# Patient Record
Sex: FEMALE | Race: BLACK OR AFRICAN AMERICAN | Employment: UNEMPLOYED | URBAN - METROPOLITAN AREA
[De-identification: names, ages, dates, MRNs, and addresses within clinical notes are randomized per-mention and may not be internally consistent; named-entity substitution may affect disease eponyms.]

---

## 2017-01-29 ENCOUNTER — HOSPITAL ENCOUNTER (EMERGENCY)
Age: 57
Discharge: HOME | End: 2017-01-29
Attending: EMERGENCY MEDICINE
Payer: MEDICARE

## 2017-01-29 ENCOUNTER — APPOINTMENT (OUTPATIENT)
Dept: GENERAL RADIOLOGY | Age: 57
End: 2017-01-29
Attending: EMERGENCY MEDICINE
Payer: MEDICARE

## 2017-01-29 VITALS
SYSTOLIC BLOOD PRESSURE: 138 MMHG | RESPIRATION RATE: 16 BRPM | DIASTOLIC BLOOD PRESSURE: 66 MMHG | WEIGHT: 293 LBS | OXYGEN SATURATION: 98 % | HEIGHT: 69 IN | HEART RATE: 75 BPM | TEMPERATURE: 97.6 F | BODY MASS INDEX: 43.4 KG/M2

## 2017-01-29 DIAGNOSIS — M25.561 ACUTE PAIN OF RIGHT KNEE: ICD-10-CM

## 2017-01-29 DIAGNOSIS — M25.551 RIGHT HIP PAIN: Primary | ICD-10-CM

## 2017-01-29 PROCEDURE — 73502 X-RAY EXAM HIP UNI 2-3 VIEWS: CPT

## 2017-01-29 PROCEDURE — 99282 EMERGENCY DEPT VISIT SF MDM: CPT

## 2017-01-29 PROCEDURE — 73562 X-RAY EXAM OF KNEE 3: CPT

## 2017-01-29 RX ORDER — OXYCODONE AND ACETAMINOPHEN 5; 325 MG/1; MG/1
1 TABLET ORAL
Qty: 15 TAB | Refills: 0 | Status: SHIPPED | OUTPATIENT
Start: 2017-01-29 | End: 2017-12-15 | Stop reason: SDUPTHER

## 2017-01-29 NOTE — DISCHARGE INSTRUCTIONS
Hip Pain: Care Instructions  Your Care Instructions  Hip pain may be caused by many things, including overuse, a fall, or a twisting movement. Another cause of hip pain is arthritis. Your pain may increase when you stand up, walk, or squat. The pain may come and go or may be constant. Home treatment can help relieve hip pain, swelling, and stiffness. If your pain is ongoing, you may need more tests and treatment. Follow-up care is a key part of your treatment and safety. Be sure to make and go to all appointments, and call your doctor if you are having problems. Its also a good idea to know your test results and keep a list of the medicines you take. How can you care for yourself at home? · Take pain medicines exactly as directed. ¨ If the doctor gave you a prescription medicine for pain, take it as prescribed. ¨ If you are not taking a prescription pain medicine, ask your doctor if you can take an over-the-counter medicine. · Rest and protect your hip. Take a break from any activity, including standing or walking, that may cause pain. · Put ice or a cold pack against your hip for 10 to 20 minutes at a time. Try to do this every 1 to 2 hours for the next 3 days (when you are awake) or until the swelling goes down. Put a thin cloth between the ice and your skin. · Sleep on your healthy side with a pillow between your knees, or sleep on your back with pillows under your knees. · If there is no swelling, you can put moist heat, a heating pad, or a warm cloth on your hip. Do gentle stretching exercises to help keep your hip flexible. · Learn how to prevent falls. Have your vision and hearing checked regularly. Wear slippers or shoes with a nonskid sole. · Stay at a healthy weight. · Wear comfortable shoes. When should you call for help? Call 911 anytime you think you may need emergency care. For example, call if:  · You have sudden chest pain and shortness of breath, or you cough up blood.   · You are not able to stand or walk or bear weight. · Your buttocks, legs, or feet feel numb or tingly. · Your leg or foot is cool or pale or changes color. · You have severe pain. Call your doctor now or seek immediate medical care if:  · You have signs of infection, such as:  ¨ Increased pain, swelling, warmth, or redness in the hip area. ¨ Red streaks leading from the hip area. ¨ Pus draining from the hip area. ¨ A fever. · You have signs of a blood clot, such as:  ¨ Pain in your calf, back of the knee, thigh, or groin. ¨ Redness and swelling in your leg or groin. · You are not able to bend, straighten, or move your leg normally. · You have trouble urinating or having bowel movements. Watch closely for changes in your health, and be sure to contact your doctor if:  · You do not get better as expected. Where can you learn more? Go to http://marylou-abdirahman.info/. Enter G456 in the search box to learn more about \"Hip Pain: Care Instructions. \"  Current as of: May 27, 2016  Content Version: 11.1  © 5215-7136 [employer]. Care instructions adapted under license by [employer] (which disclaims liability or warranty for this information). If you have questions about a medical condition or this instruction, always ask your healthcare professional. [place] [address] any warranty or liability for your use of this information. Knee Pain or Injury: Care Instructions  Your Care Instructions    Injuries are a common cause of knee problems. Sudden (acute) injuries may be caused by a direct blow to the knee. They can also be caused by abnormal twisting, bending, or falling on the knee. Pain, bruising, or swelling may be severe, and may start within minutes of the injury. Overuse is another cause of knee pain. Other causes are climbing stairs, kneeling, and other activities that use the knee.  Everyday wear and tear, especially as you get older, also can cause knee pain. Rest, along with home treatment, often relieves pain and allows your knee to heal. If you have a serious knee injury, you may need tests and treatment. Follow-up care is a key part of your treatment and safety. Be sure to make and go to all appointments, and call your doctor if you are having problems. It's also a good idea to know your test results and keep a list of the medicines you take. How can you care for yourself at home? · Be safe with medicines. Read and follow all instructions on the label. ¨ If the doctor gave you a prescription medicine for pain, take it as prescribed. ¨ If you are not taking a prescription pain medicine, ask your doctor if you can take an over-the-counter medicine. · Rest and protect your knee. Take a break from any activity that may cause pain. · Put ice or a cold pack on your knee for 10 to 20 minutes at a time. Put a thin cloth between the ice and your skin. · Prop up a sore knee on a pillow when you ice it or anytime you sit or lie down for the next 3 days. Try to keep it above the level of your heart. This will help reduce swelling. · If your knee is not swollen, you can put moist heat, a heating pad, or a warm cloth on your knee. · If your doctor recommends an elastic bandage, sleeve, or other type of support for your knee, wear it as directed. · Follow your doctor's instructions about how much weight you can put on your leg. Use a cane, crutches, or a walker as instructed. · Follow your doctor's instructions about activity during your healing process. If you can do mild exercise, slowly increase your activity. · Reach and stay at a healthy weight. Extra weight can strain the joints, especially the knees and hips, and make the pain worse. Losing even a few pounds may help. When should you call for help? Call 911 anytime you think you may need emergency care.  For example, call if:  · You have symptoms of a blood clot in your lung (called a pulmonary embolism). These may include:  ¨ Sudden chest pain. ¨ Trouble breathing. ¨ Coughing up blood. Call your doctor now or seek immediate medical care if:  · You have severe or increasing pain. · Your leg or foot turns cold or changes color. · You cannot stand or put weight on your knee. · Your knee looks twisted or bent out of shape. · You cannot move your knee. · You have signs of infection, such as:  ¨ Increased pain, swelling, warmth, or redness. ¨ Red streaks leading from the knee. ¨ Pus draining from a place on your knee. ¨ A fever. · You have signs of a blood clot in your leg (called a deep vein thrombosis), such as:  ¨ Pain in your calf, back of the knee, thigh, or groin. ¨ Redness and swelling in your leg or groin. Watch closely for changes in your health, and be sure to contact your doctor if:  · You have tingling, weakness, or numbness in your knee. · You have any new symptoms, such as swelling. · You have bruises from a knee injury that last longer than 2 weeks. · You do not get better as expected. Where can you learn more? Go to http://marylou-abdirahman.info/. Enter K195 in the search box to learn more about \"Knee Pain or Injury: Care Instructions. \"  Current as of: May 27, 2016  Content Version: 11.1  © 7495-4803 [employer]. Care instructions adapted under license by [employer] (which disclaims liability or warranty for this information). If you have questions about a medical condition or this instruction, always ask your healthcare professional. [place] [address] any warranty or liability for your use of this information. We hope that we have addressed all of your medical concerns. The examination and treatment you received in the Emergency Department were for an emergent problem and were not intended as complete care. It is important that you follow up with your healthcare provider(s) for ongoing care.  If your symptoms worsen or do not improve as expected, and you are unable to reach your usual health care provider(s), you should return to the Emergency Department. Today's healthcare is undergoing tremendous change, and patient satisfaction surveys are one of the many tools to assess the quality of medical care. You may receive a survey from the [employer] regarding your experience in the Emergency Department. I hope that your experience has been completely positive, particularly the medical care that I provided. As such, please participate in the survey; anything less than excellent does not meet my expectations or intentions. [address] [place] [address] and [employer] participate in nationally recognized quality of care measures. If your blood pressure is greater than 120/80, as reported below, we urge that you seek medical care to address the potential of high blood pressure, commonly known as hypertension. Hypertension can be hereditary or can be caused by certain medical conditions, pain, stress, or \"white coat syndrome. \"       Please make an appointment with your health care provider(s) for follow up of your Emergency Department visit. VITALS:   Patient Vitals for the past 8 hrs:   Temp Pulse Resp BP SpO2   01/29/17 1704 98.4 °F (36.9 °C) 77 17 142/80 98 %          Thank you for allowing us to provide you with medical care today. We realize that you have many choices for your emergency care needs. Please choose us in the future for any continued health care needs. Mei Munoz, [facility]: [phone]            No results found for this or any previous visit (from the past 24 hour(s)). Xr Hip Rt W Or Wo Pelv 2-3 Vws    Result Date: 1/29/2017  EXAM:  XR HIP RT W OR WO PELV 2-3 VWS INDICATION:   fall, pain. COMPARISON: None.  FINDINGS: An AP view of the pelvis and a frogleg lateral view of the right hip demonstrate no fracture, dislocation or other acute abnormality. IMPRESSION:  No acute abnormality. Xr Knee Rt 3 V    Result Date: 1/29/2017  EXAM:  XR KNEE RT 3 V INDICATION:   fall, pain. COMPARISON: None. FINDINGS: Three views of the right knee demonstrate no fracture or other acute osseous or articular abnormality. There is no effusion. There is tricompartmental osteophyte formation. IMPRESSION:  Osteoarthritis. No acute findings. Anna Marie Denny

## 2017-01-29 NOTE — ED TRIAGE NOTES
TRIAGE NOTE: Pt arrives from home with complaint of continued RIGHT hip and knee since a fall on 1/20. Pt reports she was seen at [facility] and prescribed pain medication but she continues to have pain.

## 2017-01-29 NOTE — ED PROVIDER NOTES
HPI Comments: 60 y.o. female with past medical history significant for HTN, PSVT, and KEISHA presents with complaints of right hip and knee pain. The pt states that she fell on 17 and was seen at [facility] and had a normal workup then. She was rx pain medicines and d/c home. The pt states that she is continuing to have pain. The pt states that nothing makes the pain better or worse. The pt rates the pain as a 6/10 in severity. The pt describes the pain as sharp and intermittent. The pt denies taking anything at home for the pain. There are no other acute medical complaints at this time. Denies fever, chills, HA, dizziness, light headedness, dyspnea, chest pain, abdominal pain, N/V/D, melena, hematochezia, loss of bowel/bladder functioning, saddle anesthesia, urinary symptoms or any other acute medical conditions. PCP: MD Laisha Yeh PA-C      Patient is a 60 y.o. female presenting with hip pain. Hip Injury    Pertinent negatives include no numbness and no back pain.         Past Medical History:   Diagnosis Date    Cholelithiasis     Hypertension     KEISHA (obstructive sleep apnea)      Uses CPAP    PSVT (paroxysmal supraventricular tachycardia) (HCC)      Paroxysmal Supraventricular Tachycardia    Psychiatric disorder      Depression    Unspecified adverse effect of anesthesia May 2006     Postoperative hypoxemia, treated w/ O2 & IV Diuretics       Past Surgical History:   Procedure Laterality Date    Dilation and curettage      Hx tonsil and adenoidectomy      Hx dilation and curettage  May 2006    Hx gyn  2008     Uterine Ablation    Hx  section      Pr removal gallbladder      Hx cholecystectomy           Family History:   Problem Relation Age of Onset    Cancer Mother      Lung or breast, patient unsure    Heart Attack Father     Diabetes Sister        Social History     Social History    Marital status:      Spouse name: N/A    Number of children: N/A    Years of education: N/A     Occupational History    Not on file. Social History Main Topics    Smoking status: Never Smoker    Smokeless tobacco: Never Used    Alcohol use Yes    Drug use: No    Sexual activity: Yes     Partners: Male     Birth control/ protection: None     Other Topics Concern    Not on file     Social History Narrative         ALLERGIES: Codeine; Lipitor [atorvastatin]; and Pcn [penicillins]    Review of Systems   Constitutional: Negative for activity change, appetite change, diaphoresis and fever. HENT: Negative for ear discharge, ear pain, facial swelling, rhinorrhea, sore throat, tinnitus, trouble swallowing and voice change. Eyes: Negative for photophobia, pain, discharge, redness and visual disturbance. Respiratory: Negative for cough, chest tightness, shortness of breath, wheezing and stridor. Cardiovascular: Negative for chest pain and palpitations. Gastrointestinal: Negative for abdominal pain, constipation, diarrhea, nausea and vomiting. Endocrine: Negative for polydipsia and polyuria. Genitourinary: Negative for dysuria, flank pain and hematuria. Musculoskeletal: Positive for arthralgias and myalgias. Negative for back pain. Skin: Negative for color change and rash. Neurological: Negative for dizziness, syncope, speech difficulty, light-headedness and numbness. Psychiatric/Behavioral: Negative for behavioral problems. Vitals:    01/29/17 1704   BP: 142/80   Pulse: 77   Resp: 17   Temp: 98.4 °F (36.9 °C)   SpO2: 98%   Weight: (!) 193.2 kg (426 lb)   Height: 5' 9\" (1.753 m)            Physical Exam   Constitutional: She is oriented to person, place, and time. She appears well-developed and well-nourished. HENT:   Head: Normocephalic and atraumatic. Eyes: Conjunctivae are normal. Pupils are equal, round, and reactive to light. Right eye exhibits no discharge. Left eye exhibits no discharge. Neck: Normal range of motion.  Neck supple. No thyromegaly present. Cardiovascular: Normal rate, regular rhythm and normal heart sounds. Exam reveals no gallop and no friction rub. No murmur heard. Pulmonary/Chest: Effort normal and breath sounds normal. No respiratory distress. She has no wheezes. Musculoskeletal: Normal range of motion. No C, T, L, S spine tenderness. There is some mild tenderness over the right patella. Pt has full mobility of upper and lower extremities. Pt is able to ambulate with some difficulty using a cane. No perineal numbness. Pt is NVI. Neurological: She is alert and oriented to person, place, and time. Skin: Skin is warm. Psychiatric: She has a normal mood and affect. MDM  Number of Diagnoses or Management Options  Acute pain of right knee:   Right hip pain:   Diagnosis management comments: Pt presents with right hip and knee pain after GLF. All imaging studies were normal.  Pt able to ambulate using a cane. Will rx pain medicines and advise follow up with ortho for further evaluation of symptoms. Reviewed treatment plan with attending and they agree.   Adam Gonzalez PA-C     ED Course       Procedures

## 2017-01-30 NOTE — ED NOTES
Pt given discharge instructions, patient education, prescriptions, and follow up information by Provider. Pt states understanding. All questions answered. Pt discharged to home in private vehicle, wheeled out in wheelchair. Pt A/Ox4, RA, pain controlled.

## 2017-02-02 ENCOUNTER — HOSPITAL ENCOUNTER (OUTPATIENT)
Dept: CT IMAGING | Age: 57
Discharge: HOME | End: 2017-02-02
Attending: ORTHOPAEDIC SURGERY
Payer: MEDICARE

## 2017-02-02 DIAGNOSIS — S79.911A: ICD-10-CM

## 2017-02-02 PROCEDURE — 72192 CT PELVIS W/O DYE: CPT

## 2017-08-11 ENCOUNTER — TELEPHONE (OUTPATIENT)
Dept: INTERNAL MEDICINE CLINIC | Age: 57
End: 2017-08-11

## 2017-08-11 NOTE — TELEPHONE ENCOUNTER
Spoke with patient after 2 patient identifiers being note and advised that I could see her on Monday as a nurse visit triage her and then see if a doctor could see her, she declined stated she only wanted to see a doctor. I offered the next available appt onThursday, August 17, 2017 10:30 AM, pt accepted. Patient expressed understanding and has no further questions at this time.

## 2017-08-11 NOTE — TELEPHONE ENCOUNTER
#[phone] pt states she has foot swelling and pain. She would like an appt to see someone on Monday. As of right now I didn't have anything to offer. Please call pt.

## 2017-10-11 ENCOUNTER — TELEPHONE (OUTPATIENT)
Dept: INTERNAL MEDICINE CLINIC | Age: 57
End: 2017-10-11

## 2017-10-12 NOTE — TELEPHONE ENCOUNTER
Spoke with patient after 2 patient identifiers being note and advised that we have an appt on Monday, October 16, 2017 01:45 PM, patient accepted. Patient expressed understanding and has no further questions at this time.

## 2017-10-16 ENCOUNTER — OFFICE VISIT (OUTPATIENT)
Dept: INTERNAL MEDICINE CLINIC | Age: 57
End: 2017-10-16

## 2017-10-16 VITALS
SYSTOLIC BLOOD PRESSURE: 132 MMHG | TEMPERATURE: 98.1 F | DIASTOLIC BLOOD PRESSURE: 79 MMHG | HEIGHT: 69 IN | OXYGEN SATURATION: 96 % | HEART RATE: 86 BPM

## 2017-10-16 DIAGNOSIS — Z23 ENCOUNTER FOR IMMUNIZATION: ICD-10-CM

## 2017-10-16 DIAGNOSIS — M54.9 ACUTE BACK PAIN, UNSPECIFIED BACK LOCATION, UNSPECIFIED BACK PAIN LATERALITY: Primary | ICD-10-CM

## 2017-10-16 RX ORDER — HYDROCODONE BITARTRATE AND ACETAMINOPHEN 5; 325 MG/1; MG/1
1 TABLET ORAL
Qty: 21 TAB | Refills: 0 | Status: SHIPPED | OUTPATIENT
Start: 2017-10-16 | End: 2017-12-15 | Stop reason: SDUPTHER

## 2017-10-16 RX ORDER — CYCLOBENZAPRINE HCL 10 MG
10 TABLET ORAL
Qty: 30 TAB | Refills: 1 | Status: SHIPPED | OUTPATIENT
Start: 2017-10-16 | End: 2017-12-15 | Stop reason: SDUPTHER

## 2017-10-16 NOTE — MR AVS SNAPSHOT
Visit Information Date & Time Provider Department Dept. Phone Encounter #  
 10/16/2017  1:45 PM Silver Dahl, [address] [phone] [id number] Follow-up Instructions Return in about 1 month (around 11/16/2017), or if symptoms worsen or fail to improve, for cpe. Upcoming Health Maintenance Date Due Hepatitis C Screening 1960 DTaP/Tdap/Td series (1 - Tdap) 4/18/1979 BREAST CANCER SCRN MAMMOGRAM 6/16/2017 INFLUENZA AGE 9 TO ADULT 8/1/2017 PAP AKA CERVICAL CYTOLOGY 9/22/2017 FOBT Q 1 YEAR AGE 50-75 11/10/2017 Allergies as of 10/16/2017  Review Complete On: 1/29/2017 By: Handy Kovacs RN Severity Noted Reaction Type Reactions Codeine  08/27/2009    Other (comments)  
 hallucinates Lipitor [Atorvastatin]  08/27/2009    Myalgia Pcn [Penicillins]  08/27/2009    Hives Current Immunizations  Reviewed on 11/6/2012 Name Date Influenza Vaccine (Quad) PF 10/16/2017, 9/26/2016 Influenza Vaccine PF 9/9/2013 Influenza Vaccine Split 11/6/2012 11:53 AM  
  
 Not reviewed this visit You Were Diagnosed With   
  
 Codes Comments Acute back pain, unspecified back location, unspecified back pain laterality    -  Primary ICD-10-CM: M54.9 ICD-9-CM: 724.5 Encounter for immunization     ICD-10-CM: Y90 ICD-9-CM: V03.89 Vitals BP Pulse Temp Height(growth percentile) SpO2 OB Status 132/79 (BP 1 Location: Left arm, BP Patient Position: Sitting) 86 98.1 °F (36.7 °C) (Oral) 5' 9\" (1.753 m) 96% Ablation Smoking Status Never Smoker Preferred Pharmacy Pharmacy Name Phone [facility]  [place] [phone] Your Updated Medication List  
  
   
This list is accurate as of: 10/16/17  2:52 PM.  Always use your most recent med list.  
  
  
  
  
 BELSOMRA 20 mg tablet Generic drug:  suvorexant Take  by mouth nightly as needed for Insomnia. * cyclobenzaprine 10 mg tablet Commonly known as:  FLEXERIL Take 1 Tab by mouth two (2) times a day. * cyclobenzaprine 10 mg tablet Commonly known as:  FLEXERIL Take 1 Tab by mouth three (3) times daily as needed for Muscle Spasm(s). CYMBALTA 60 mg capsule Generic drug:  DULoxetine Take 120 mg by mouth daily. diazePAM 5 mg tablet Commonly known as:  VALIUM Take 1 Tab by mouth three (3) times daily as needed for Anxiety (spasm). Max Daily Amount: 15 mg.  
  
 * diclofenac EC 50 mg EC tablet Commonly known as:  VOLTAREN Take 1 Tab by mouth two (2) times a day. * VOLTAREN 1 % Gel Generic drug:  diclofenac DIOVAN 160 mg tablet Generic drug:  valsartan TAKE 1 TABLET BY MOUTH EVERY DAY  
  
 flecainide 100 mg tablet Commonly known as:  TAMBOCOR Take 100 mg by mouth daily. furosemide 20 mg tablet Commonly known as:  LASIX TAKE 1 TABLET BY MOUTH EVERY DAY  
  
 hydroCHLOROthiazide 25 mg tablet Commonly known as:  HYDRODIURIL Take 25 mg by mouth daily. * HYDROcodone-acetaminophen 7.5-325 mg per tablet Commonly known as:  Lynnetta Pat Take 1 Tab by mouth every six (6) hours as needed for Pain. Max Daily Amount: 4 Tabs. * HYDROcodone-acetaminophen 5-325 mg per tablet Commonly known as:  Lynnetta Pat Take 1 Tab by mouth every eight (8) hours as needed for Pain. Max Daily Amount: 3 Tabs. melatonin 3 mg tablet Take  by mouth. Taking 5 mg  
  
 nystatin powder Commonly known as:  MYCOSTATIN Apply  to affected area four (4) times daily. Omeprazole delayed release 20 mg tablet Commonly known as:  PRILOSEC D/R Take 1 Tab by mouth daily. oxybutynin chloride XL 10 mg CR tablet Commonly known as:  DITROPAN XL Take 10 mg by mouth daily. * oxyCODONE-acetaminophen 5-325 mg per tablet Commonly known as:  PERCOCET  
 Take 1 Tab by mouth every four (4) hours as needed for Pain. * oxyCODONE-acetaminophen 5-325 mg per tablet Commonly known as:  PERCOCET Take 1 Tab by mouth every four (4) hours as needed for Pain. Max Daily Amount: 6 Tabs. rosuvastatin 5 mg tablet Commonly known as:  CRESTOR  
TAKE 1 TABLET BY MOUTH DAILY  
  
 sulfacetamide 10 % ophthalmic solution Commonly known as:  BLEPH-10 Administer 1 Drop to both eyes four (4) times daily. verapamil  mg CR tablet Commonly known as:  CALAN-SR  
TAKE 1 TABLET BY MOUTH EVERY DAY WELLBUTRIN  mg XL tablet Generic drug:  buPROPion XL Take 300 mg by mouth every morning. * Notice: This list has 8 medication(s) that are the same as other medications prescribed for you. Read the directions carefully, and ask your doctor or other care provider to review them with you. Prescriptions Printed Refills HYDROcodone-acetaminophen (NORCO) 5-325 mg per tablet 0 Sig: Take 1 Tab by mouth every eight (8) hours as needed for Pain. Max Daily Amount: 3 Tabs. Class: Print Route: Oral  
  
Prescriptions Sent to Pharmacy Refills  
 cyclobenzaprine (FLEXERIL) 10 mg tablet 1 Sig: Take 1 Tab by mouth three (3) times daily as needed for Muscle Spasm(s). Class: Normal  
 Pharmacy: [facility] [address] #: [phone] Route: Oral  
  
We Performed the Following INFLUENZA VIRUS VAC QUAD,SPLIT,PRESV FREE SYRINGE IM [id number] CPT(R)] VA IMMUNIZ ADMIN,1 SINGLE/COMB VAC/TOXOID [id number] CPT(R)] Follow-up Instructions Return in about 1 month (around 11/16/2017), or if symptoms worsen or fail to improve, for cpe. Introducing [facility]! Dear Denis Query: Thank you for requesting a [employer] account. Our records indicate that you already have an active [employer] account.   You can access your account anytime at [email or web address] Did you know that you can access your hospital and ER discharge instructions at any time in [employer]? You can also review all of your test results from your hospital stay or ER visit. Additional Information If you have questions, please visit the Frequently Asked Questions section of the [employer] website at [email or web address] Remember, [employer] is NOT to be used for urgent needs. For medical emergencies, dial 911. Now available from your iPhone and Android! Please provide this summary of care documentation to your next provider. Your primary care clinician is listed as Siria PERALTA. If you have any questions after today's visit, please call [phone].

## 2017-10-16 NOTE — PROGRESS NOTES
HISTORY OF PRESENT ILLNESS  Rosibel Roberson is a 59 y.o. female. HPI     C/o pain in upper to lower back x 1 week  Pain in back muscles worse with activity, taking deep breath and any movements  Has taken motrin otc 2 q6 hrs this past week--not helping the pain  Pain isdull but stabbing when stands up. No radiation to legs  Pain is 8/10 sitting      Patient Active Problem List    Diagnosis Date Noted    Morbid obesity with body mass index of 60.0-69.9 in adult [facility]) 03/01/2016    KEISHA (obstructive sleep apnea) 10/11/2010    Essential hypertension, benign 02/20/2010    [facility] 02/20/2010    Depression 02/20/2010    Carpal tunnel syndrome 02/20/2010    Pure hypercholesterolemia 02/20/2010     Current Outpatient Prescriptions   Medication Sig Dispense Refill    suvorexant (BELSOMRA) 20 mg tablet Take  by mouth nightly as needed for Insomnia.  cyclobenzaprine (FLEXERIL) 10 mg tablet Take 1 Tab by mouth three (3) times daily as needed for Muscle Spasm(s). 30 Tab 1    HYDROcodone-acetaminophen (NORCO) 5-325 mg per tablet Take 1 Tab by mouth every eight (8) hours as needed for Pain. Max Daily Amount: 3 Tabs. 21 Tab 0    rosuvastatin (CRESTOR) 5 mg tablet TAKE 1 TABLET BY MOUTH DAILY 90 Tab 6    furosemide (LASIX) 20 mg tablet TAKE 1 TABLET BY MOUTH EVERY DAY 90 Tab 3    verapamil ER (CALAN-SR) 240 mg CR tablet TAKE 1 TABLET BY MOUTH EVERY DAY 90 Tab 3    nystatin (MYCOSTATIN) powder Apply  to affected area four (4) times daily. 30 g 1    oxybutynin chloride XL (DITROPAN XL) 10 mg CR tablet Take 10 mg by mouth daily.  DIOVAN 160 mg tablet TAKE 1 TABLET BY MOUTH EVERY DAY 30 Tab 0    buPROPion XL (WELLBUTRIN XL) 300 mg XL tablet Take 300 mg by mouth every morning.  flecainide (TAMBOCOR) 100 mg tablet Take 100 mg by mouth daily.  duloxetine (CYMBALTA) 60 mg capsule Take 120 mg by mouth daily.       oxyCODONE-acetaminophen (PERCOCET) 5-325 mg per tablet Take 1 Tab by mouth every four (4) hours as needed for Pain. Max Daily Amount: 6 Tabs. 15 Tab 0    diazepam (VALIUM) 5 mg tablet Take 1 Tab by mouth three (3) times daily as needed for Anxiety (spasm). Max Daily Amount: 15 mg. 20 Tab 0    VOLTAREN 1 % gel   1    HYDROcodone-acetaminophen (NORCO) 7.5-325 mg per tablet Take 1 Tab by mouth every six (6) hours as needed for Pain. Max Daily Amount: 4 Tabs. 20 Tab 0    sulfacetamide (BLEPH-10) 10 % ophthalmic solution Administer 1 Drop to both eyes four (4) times daily. 1 Bottle 0    oxyCODONE-acetaminophen (PERCOCET) 5-325 mg per tablet Take 1 Tab by mouth every four (4) hours as needed for Pain. 20 Tab 0    melatonin 3 mg tablet Take  by mouth. Taking 5 mg         diclofenac EC (VOLTAREN) 50 mg EC tablet Take 1 Tab by mouth two (2) times a day. 30 Tab 1    cyclobenzaprine (FLEXERIL) 10 mg tablet Take 1 Tab by mouth two (2) times a day. 30 Tab 0    Omeprazole delayed release (PRILOSEC D/R) 20 mg tablet Take 1 Tab by mouth daily. 30 Tab 2    hydrochlorothiazide (HYDRODIURIL) 25 mg tablet Take 25 mg by mouth daily.        Allergies   Allergen Reactions    Codeine Other (comments)     hallucinates    Lipitor [Atorvastatin] Myalgia    Pcn [Penicillins] Hives      Lab Results  Component Value Date/Time   Hemoglobin A1c 5.6 09/26/2016 10:46 AM   Hemoglobin A1c 5.6 04/29/2015 10:46 AM   Hemoglobin A1c 5.1 12/24/2012 09:51 AM   Glucose 113 09/26/2016 10:46 AM   Glucose (POC) 110 08/04/2010 09:57 AM   LDL, calculated 79 09/26/2016 10:46 AM   Creatinine (POC) 1.1 08/04/2010 09:57 AM   Creatinine 1.08 09/26/2016 10:46 AM      Lab Results  Component Value Date/Time   Cholesterol, total 145 09/26/2016 10:46 AM   Cholesterol (POC) 158 05/19/2014 11:54 AM   HDL Cholesterol 53 09/26/2016 10:46 AM   LDL, calculated 79 09/26/2016 10:46 AM   LDL Cholesterol (POC) 59 05/19/2014 11:54 AM   Triglyceride 65 09/26/2016 10:46 AM   Triglycerides (POC) 209 05/19/2014 11:54 AM     Lab Results  Component Value Date/Time   GFR est non-AA 57 09/26/2016 10:46 AM   GFRNA, POC 55 08/04/2010 09:57 AM   GFR est AA 65 09/26/2016 10:46 AM   GFRAA, POC >60 08/04/2010 09:57 AM   Creatinine 1.08 09/26/2016 10:46 AM   Creatinine (POC) 1.1 08/04/2010 09:57 AM   BUN 19 09/26/2016 10:46 AM   BUN (POC) 17 08/04/2010 09:57 AM   Sodium 142 09/26/2016 10:46 AM   Sodium (POC) 141 08/04/2010 09:57 AM   Potassium 4.7 09/26/2016 10:46 AM   Potassium (POC) 4.1 08/04/2010 09:57 AM   Chloride 100 09/26/2016 10:46 AM   Chloride (POC) 106 08/04/2010 09:57 AM   CO2 23 09/26/2016 10:46 AM          ROS    Physical Exam   Constitutional: She appears well-developed and well-nourished. Appears stated age, severe obesity, nad sitting in [facility]   Cardiovascular: Normal rate, regular rhythm and normal heart sounds. Exam reveals no gallop and no friction rub. No murmur heard. Pulmonary/Chest: Effort normal and breath sounds normal. No respiratory distress. She has no wheezes. Abdominal: Soft. Bowel sounds are normal.   Musculoskeletal: She exhibits no edema. No TTP of the spine  Mild TTP of paraspinous muscles  Decreased flexion and extension of back    Neurological: She is alert. Psychiatric: She has a normal mood and affect. Nursing note and vitals reviewed. ASSESSMENT and PLAN  Diagnoses and all orders for this visit:    1. Acute back pain, unspecified back location, unspecified back pain laterality   C/w strain   Heat and ice renée   flexril tid prn   lortab 5/325 mg tid prn--discussed use of otc Lax, cautioned of sedation with above medicines  2. Encounter for immunization  -     Influenza virus vaccine (QUADRIVALENT PRES FREE SYRINGE) IM (73070)  -     IN IMMUNIZ ADMIN,1 SINGLE/COMB VAC/TOXOID    Other orders  -     cyclobenzaprine (FLEXERIL) 10 mg tablet; Take 1 Tab by mouth three (3) times daily as needed for Muscle Spasm(s). -     HYDROcodone-acetaminophen (NORCO) 5-325 mg per tablet;  Take 1 Tab by mouth every eight (8) hours as needed for Pain. Max Daily Amount: 3 Tabs. Follow-up Disposition:  Return in about 1 month (around 11/16/2017), or if symptoms worsen or fail to improve, for cpe.

## 2017-12-15 ENCOUNTER — OFFICE VISIT (OUTPATIENT)
Dept: INTERNAL MEDICINE CLINIC | Age: 57
End: 2017-12-15

## 2017-12-15 VITALS
HEART RATE: 85 BPM | DIASTOLIC BLOOD PRESSURE: 70 MMHG | OXYGEN SATURATION: 92 % | SYSTOLIC BLOOD PRESSURE: 124 MMHG | TEMPERATURE: 98 F

## 2017-12-15 DIAGNOSIS — M54.50 BILATERAL LOW BACK PAIN WITHOUT SCIATICA, UNSPECIFIED CHRONICITY: Primary | ICD-10-CM

## 2017-12-15 DIAGNOSIS — Z12.11 COLON CANCER SCREENING: ICD-10-CM

## 2017-12-15 DIAGNOSIS — I10 ESSENTIAL HYPERTENSION: ICD-10-CM

## 2017-12-15 DIAGNOSIS — Z11.59 NEED FOR HEPATITIS C SCREENING TEST: ICD-10-CM

## 2017-12-15 DIAGNOSIS — R79.89 ELEVATED TSH: ICD-10-CM

## 2017-12-15 DIAGNOSIS — E78.00 PURE HYPERCHOLESTEROLEMIA: ICD-10-CM

## 2017-12-15 RX ORDER — HYDROCODONE BITARTRATE AND ACETAMINOPHEN 5; 325 MG/1; MG/1
1 TABLET ORAL
Qty: 21 TAB | Refills: 0 | Status: SHIPPED | OUTPATIENT
Start: 2017-12-15 | End: 2018-06-28

## 2017-12-15 NOTE — MR AVS SNAPSHOT
Visit Information Date & Time Provider Department Dept. Phone Encounter #  
 12/15/2017  9:45 AM Maciej Walter, [address] [phone] [id number] Follow-up Instructions Return in about 6 months (around 6/15/2018) for htn hld obesity . Upcoming Health Maintenance Date Due Hepatitis C Screening 1960 DTaP/Tdap/Td series (1 - Tdap) 4/18/1979 PAP AKA CERVICAL CYTOLOGY 9/22/2017 FOBT Q 1 YEAR AGE 50-75 11/10/2017 Allergies as of 12/15/2017  Review Complete On: 12/15/2017 By: Olinda Blocker, LPN Severity Noted Reaction Type Reactions Codeine  08/27/2009    Other (comments)  
 hallucinates Lipitor [Atorvastatin]  08/27/2009    Myalgia Pcn [Penicillins]  08/27/2009    Hives Current Immunizations  Reviewed on 11/6/2012 Name Date Influenza Vaccine (Quad) PF 10/16/2017, 9/26/2016 Influenza Vaccine PF 9/9/2013 Influenza Vaccine Split 11/6/2012 11:53 AM  
  
 Not reviewed this visit You Were Diagnosed With   
  
 Codes Comments Bilateral low back pain without sciatica, unspecified chronicity    -  Primary ICD-10-CM: M54.5 ICD-9-CM: 724.2 Essential hypertension     ICD-10-CM: I10 
ICD-9-CM: 401.9 Pure hypercholesterolemia     ICD-10-CM: E78.00 ICD-9-CM: 272.0 Elevated TSH     ICD-10-CM: R94.6 ICD-9-CM: 794.5 Vitals BP Pulse Temp SpO2 OB Status Smoking Status 124/70 85 98 °F (36.7 °C) (Oral) 92% Ablation Never Smoker Vitals History Preferred Pharmacy Pharmacy Name Phone [facility] [address]  [place] [phone] Your Updated Medication List  
  
   
This list is accurate as of: 12/15/17 10:54 AM.  Always use your most recent med list.  
  
  
  
  
 BELSOMRA 20 mg tablet Generic drug:  suvorexant Take  by mouth nightly as needed for Insomnia. cyclobenzaprine 10 mg tablet Commonly known as:  FLEXERIL Take 1 Tab by mouth two (2) times a day. CYMBALTA 60 mg capsule Generic drug:  DULoxetine Take 120 mg by mouth daily. diazePAM 5 mg tablet Commonly known as:  VALIUM Take 1 Tab by mouth three (3) times daily as needed for Anxiety (spasm). Max Daily Amount: 15 mg.  
  
 * diclofenac EC 50 mg EC tablet Commonly known as:  VOLTAREN Take 1 Tab by mouth two (2) times a day. * VOLTAREN 1 % Gel Generic drug:  diclofenac DIOVAN 160 mg tablet Generic drug:  valsartan TAKE 1 TABLET BY MOUTH EVERY DAY  
  
 flecainide 100 mg tablet Commonly known as:  TAMBOCOR Take 100 mg by mouth daily. furosemide 20 mg tablet Commonly known as:  LASIX TAKE 1 TABLET BY MOUTH EVERY DAY  
  
 hydroCHLOROthiazide 25 mg tablet Commonly known as:  HYDRODIURIL Take 25 mg by mouth daily. * HYDROcodone-acetaminophen 7.5-325 mg per tablet Commonly known as:  Rosie Chong Take 1 Tab by mouth every six (6) hours as needed for Pain. Max Daily Amount: 4 Tabs. * HYDROcodone-acetaminophen 5-325 mg per tablet Commonly known as:  Rosie Chong Take 1 Tab by mouth every eight (8) hours as needed for Pain. Max Daily Amount: 3 Tabs. melatonin 3 mg tablet Take  by mouth. Taking 5 mg  
  
 nystatin powder Commonly known as:  MYCOSTATIN Apply  to affected area four (4) times daily. Omeprazole delayed release 20 mg tablet Commonly known as:  PRILOSEC D/R Take 1 Tab by mouth daily. oxybutynin chloride XL 10 mg CR tablet Commonly known as:  DITROPAN XL Take 10 mg by mouth daily. rosuvastatin 5 mg tablet Commonly known as:  CRESTOR  
TAKE 1 TABLET BY MOUTH DAILY  
  
 verapamil  mg CR tablet Commonly known as:  CALAN-SR  
TAKE 1 TABLET BY MOUTH EVERY DAY WELLBUTRIN  mg XL tablet Generic drug:  buPROPion XL Take 300 mg by mouth every morning. * Notice: This list has 4 medication(s) that are the same as other medications prescribed for you. Read the directions carefully, and ask your doctor or other care provider to review them with you. Prescriptions Printed Refills HYDROcodone-acetaminophen (NORCO) 5-325 mg per tablet 0 Sig: Take 1 Tab by mouth every eight (8) hours as needed for Pain. Max Daily Amount: 3 Tabs. Class: Print Route: Oral  
  
We Performed the Following CBC W/O DIFF [21797 CPT(R)] LIPID PANEL [04003 CPT(R)] METABOLIC PANEL, COMPREHENSIVE [43613 CPT(R)] T4, FREE [id number] CPT(R)] TSH 3RD GENERATION [08556 CPT(R)] Follow-up Instructions Return in about 6 months (around 6/15/2018) for htn hld obesity . Introducing [facility]! Dear Kemal Molina: Thank you for requesting a [facility] account. Our records indicate that you already have an active [facility] account. You can access your account anytime at [email or web address] Did you know that you can access your hospital and ER discharge instructions at any time in [facility]? You can also review all of your test results from your hospital stay or ER visit. Additional Information If you have questions, please visit the Frequently Asked Questions section of the [facility] website at [email or web address] Remember, [facility] is NOT to be used for urgent needs. For medical emergencies, dial 911. Now available from your iPhone and Android! Please provide this summary of care documentation to your next provider. Your primary care clinician is listed as Jean PERALTA. If you have any questions after today's visit, please call [phone].

## 2017-12-15 NOTE — PROGRESS NOTES
HISTORY OF PRESENT ILLNESS  Marcos Rich is a 59 y.o. female. HPI     F/u HTN HLD  Sees Dr Carol Henning for hx PSVT-no palpitations  F/u uper and low back pain--overall improved but has some pain left back with inspriation and has to use a cane to walk d/t back pain  requiests refill of lortab--uses infrequently only for severe pain  Had mammogram and pap last summer at [facility]  Had gout attack in foot last month treated with medrol renée at pt first--uric acid 8.0. First attack of gout ever. Patient Active Problem List    Diagnosis Date Noted    Morbid obesity with body mass index of 60.0-69.9 in adult [facility]) 03/01/2016    KEISHA (obstructive sleep apnea) 10/11/2010    Essential hypertension, benign 02/20/2010    PSVT (paroxysmal supraventricular tachycardia) ([facility] 02/20/2010    Depression 02/20/2010    Carpal tunnel syndrome 02/20/2010    Pure hypercholesterolemia 02/20/2010     Current Outpatient Prescriptions   Medication Sig Dispense Refill    HYDROcodone-acetaminophen (NORCO) 5-325 mg per tablet Take 1 Tab by mouth every eight (8) hours as needed for Pain. Max Daily Amount: 3 Tabs. 21 Tab 0    suvorexant (BELSOMRA) 20 mg tablet Take  by mouth nightly as needed for Insomnia.  rosuvastatin (CRESTOR) 5 mg tablet TAKE 1 TABLET BY MOUTH DAILY 90 Tab 6    furosemide (LASIX) 20 mg tablet TAKE 1 TABLET BY MOUTH EVERY DAY 90 Tab 3    verapamil ER (CALAN-SR) 240 mg CR tablet TAKE 1 TABLET BY MOUTH EVERY DAY 90 Tab 3    nystatin (MYCOSTATIN) powder Apply  to affected area four (4) times daily. 30 g 1    diazepam (VALIUM) 5 mg tablet Take 1 Tab by mouth three (3) times daily as needed for Anxiety (spasm). Max Daily Amount: 15 mg. 20 Tab 0    VOLTAREN 1 % gel   1    HYDROcodone-acetaminophen (NORCO) 7.5-325 mg per tablet Take 1 Tab by mouth every six (6) hours as needed for Pain. Max Daily Amount: 4 Tabs. 20 Tab 0    oxybutynin chloride XL (DITROPAN XL) 10 mg CR tablet Take 10 mg by mouth daily.       diclofenac EC (VOLTAREN) 50 mg EC tablet Take 1 Tab by mouth two (2) times a day. 30 Tab 1    DIOVAN 160 mg tablet TAKE 1 TABLET BY MOUTH EVERY DAY 30 Tab 0    buPROPion XL (WELLBUTRIN XL) 300 mg XL tablet Take 300 mg by mouth every morning.  cyclobenzaprine (FLEXERIL) 10 mg tablet Take 1 Tab by mouth two (2) times a day. 30 Tab 0    flecainide (TAMBOCOR) 100 mg tablet Take 100 mg by mouth daily.  duloxetine (CYMBALTA) 60 mg capsule Take 120 mg by mouth daily.  melatonin 3 mg tablet Take  by mouth. Taking 5 mg         Omeprazole delayed release (PRILOSEC D/R) 20 mg tablet Take 1 Tab by mouth daily. 30 Tab 2    hydrochlorothiazide (HYDRODIURIL) 25 mg tablet Take 25 mg by mouth daily.        Allergies   Allergen Reactions    Codeine Other (comments)     hallucinates    Lipitor [Atorvastatin] Myalgia    Pcn [Penicillins] Hives      Lab Results  Component Value Date/Time   Hemoglobin A1c 5.6 09/26/2016 10:46 AM   Hemoglobin A1c 5.6 04/29/2015 10:46 AM   Hemoglobin A1c 5.1 12/24/2012 09:51 AM   Glucose 113 09/26/2016 10:46 AM   Glucose (POC) 110 08/04/2010 09:57 AM   LDL, calculated 79 09/26/2016 10:46 AM   Creatinine (POC) 1.1 08/04/2010 09:57 AM   Creatinine 1.08 09/26/2016 10:46 AM      Lab Results  Component Value Date/Time   Cholesterol, total 145 09/26/2016 10:46 AM   Cholesterol (POC) 158 05/19/2014 11:54 AM   HDL Cholesterol 53 09/26/2016 10:46 AM   LDL, calculated 79 09/26/2016 10:46 AM   LDL Cholesterol (POC) 59 05/19/2014 11:54 AM   Triglyceride 65 09/26/2016 10:46 AM   Triglycerides (POC) 209 05/19/2014 11:54 AM     Lab Results  Component Value Date/Time   GFR est non-AA 57 09/26/2016 10:46 AM   GFRNA, POC 55 08/04/2010 09:57 AM   GFR est AA 65 09/26/2016 10:46 AM   GFRAA, POC >60 08/04/2010 09:57 AM   Creatinine 1.08 09/26/2016 10:46 AM   Creatinine (POC) 1.1 08/04/2010 09:57 AM   BUN 19 09/26/2016 10:46 AM   BUN (POC) 17 08/04/2010 09:57 AM   Sodium 142 09/26/2016 10:46 AM   Sodium (POC) 141 08/04/2010 09:57 AM   Potassium 4.7 09/26/2016 10:46 AM   Potassium (POC) 4.1 08/04/2010 09:57 AM   Chloride 100 09/26/2016 10:46 AM   Chloride (POC) 106 08/04/2010 09:57 AM   CO2 23 09/26/2016 10:46 AM          ROS    Physical Exam   Constitutional: She appears well-developed and well-nourished. Appears stated age  Severely obese, sitting in [facility]   Cardiovascular: Normal rate, regular rhythm and normal heart sounds. Exam reveals no gallop and no friction rub. No murmur heard. Pulmonary/Chest: Effort normal and breath sounds normal. No respiratory distress. She has no wheezes. Abdominal: Soft. Bowel sounds are normal.   Musculoskeletal: She exhibits no edema. TTP lower and upper back area but not on vertebral area   Neurological: She is alert. Psychiatric: She has a normal mood and affect. Nursing note and vitals reviewed. ASSESSMENT and PLAN  Diagnoses and all orders for this visit:    1. Bilateral low back pain without sciatica, unspecified chronicity   Flexeril prn   Refill norco only for severe pain 21 tabs/nr   Heat renée  2. Essential hypertension  -     CBC W/O DIFF  -     METABOLIC PANEL, COMPREHENSIVE   controlled    3. Pure hypercholesterolemia  -     METABOLIC PANEL, COMPREHENSIVE  -     LIPID PANEL  -     TSH 3RD GENERATION  -     T4, FREE   Continue statin    4. Elevated TSH  -     TSH 3RD GENERATION  -     T4, FREE   Asymtpomatic, subclinical  5. Colon cancer screening  -     OCCULT BLOOD, IMMUNOASSAY (FIT)   Pt prefers FIT over colonoscopy    6. Need for hepatitis C screening test  -     HEPATITIS C AB    Other orders  -     HYDROcodone-acetaminophen (NORCO) 5-325 mg per tablet; Take 1 Tab by mouth every eight (8) hours as needed for Pain. Max Daily Amount: 3 Tabs. Follow-up Disposition:  Return in about 6 months (around 6/15/2018) for htn hld obesity .

## 2017-12-16 LAB
ALBUMIN SERPL-MCNC: 3.9 G/DL (ref 3.5–5.5)
ALBUMIN/GLOB SERPL: 1.4 {RATIO} (ref 1.2–2.2)
ALP SERPL-CCNC: 69 IU/L (ref 39–117)
ALT SERPL-CCNC: 6 IU/L (ref 0–32)
AST SERPL-CCNC: 9 IU/L (ref 0–40)
BILIRUB SERPL-MCNC: 0.3 MG/DL (ref 0–1.2)
BUN SERPL-MCNC: 15 MG/DL (ref 6–24)
BUN/CREAT SERPL: 14 (ref 9–23)
CALCIUM SERPL-MCNC: 9 MG/DL (ref 8.7–10.2)
CHLORIDE SERPL-SCNC: 100 MMOL/L (ref 96–106)
CHOLEST SERPL-MCNC: 159 MG/DL (ref 100–199)
CO2 SERPL-SCNC: 27 MMOL/L (ref 18–29)
CREAT SERPL-MCNC: 1.05 MG/DL (ref 0.57–1)
ERYTHROCYTE [DISTWIDTH] IN BLOOD BY AUTOMATED COUNT: 14.4 % (ref 12.3–15.4)
GFR SERPLBLD CREATININE-BSD FMLA CKD-EPI: 58 ML/MIN/1.73
GFR SERPLBLD CREATININE-BSD FMLA CKD-EPI: 67 ML/MIN/1.73
GLOBULIN SER CALC-MCNC: 2.7 G/DL (ref 1.5–4.5)
GLUCOSE SERPL-MCNC: 100 MG/DL (ref 65–99)
HCT VFR BLD AUTO: 39.8 % (ref 34–46.6)
HCV AB S/CO SERPL IA: <0.1 S/CO RATIO (ref 0–0.9)
HDLC SERPL-MCNC: 52 MG/DL
HGB BLD-MCNC: 12.4 G/DL (ref 11.1–15.9)
LDLC SERPL CALC-MCNC: 91 MG/DL (ref 0–99)
MCH RBC QN AUTO: 29.1 PG (ref 26.6–33)
MCHC RBC AUTO-ENTMCNC: 31.2 G/DL (ref 31.5–35.7)
MCV RBC AUTO: 93 FL (ref 79–97)
PLATELET # BLD AUTO: 183 X10E3/UL (ref 150–379)
POTASSIUM SERPL-SCNC: 4 MMOL/L (ref 3.5–5.2)
PROT SERPL-MCNC: 6.6 G/DL (ref 6–8.5)
RBC # BLD AUTO: 4.26 X10E6/UL (ref 3.77–5.28)
SODIUM SERPL-SCNC: 140 MMOL/L (ref 134–144)
T4 FREE SERPL-MCNC: 1.03 NG/DL (ref 0.82–1.77)
TRIGL SERPL-MCNC: 80 MG/DL (ref 0–149)
TSH SERPL DL<=0.005 MIU/L-ACNC: 6.46 UIU/ML (ref 0.45–4.5)
VLDLC SERPL CALC-MCNC: 16 MG/DL (ref 5–40)
WBC # BLD AUTO: 4.7 X10E3/UL (ref 3.4–10.8)

## 2017-12-19 PROBLEM — E03.8 SUBCLINICAL HYPOTHYROIDISM: Status: ACTIVE | Noted: 2017-12-19

## 2017-12-19 RX ORDER — VALSARTAN 160 MG/1
TABLET ORAL
Qty: 90 TAB | Refills: 0 | Status: SHIPPED | OUTPATIENT
Start: 2017-12-19 | End: 2018-03-09 | Stop reason: SDUPTHER

## 2017-12-19 RX ORDER — FUROSEMIDE 20 MG/1
TABLET ORAL
Qty: 90 TAB | Refills: 0 | Status: SHIPPED | OUTPATIENT
Start: 2017-12-19 | End: 2018-03-16 | Stop reason: SDUPTHER

## 2017-12-20 NOTE — PROGRESS NOTES
Spoke with patient after 2 patient identifiers being note and advised per Dr. Jackson Range pt normal cbc. Kidney liver lytes cholesterol. Glucose of 100 is borderline elevated--low carb diet is recommended. Thyroid level is borderline low but nornal free t4--should repeat tsh and t4 in 6 mos--if feeling very fatigued or cold, then can consider starting low dose thyroid hormone. Patient expressed understanding and has no further questions at this time.

## 2017-12-20 NOTE — PROGRESS NOTES
Tell pt normal cbc. Kidney liver lytes cholesterol. Glucose of 100 is borderline elevated--low carb diet is recommended.   Thyroid level is borderline low but nornal free t4--should repeat tsh and t4 in 6 mos--if feeling very fatigued or cold, then can consider starting low dose thyroid hormone

## 2018-01-26 RX ORDER — NYSTATIN 100000 [USP'U]/G
POWDER TOPICAL
Qty: 30 G | Refills: 0 | Status: SHIPPED | OUTPATIENT
Start: 2018-01-26 | End: 2019-04-03

## 2018-03-10 RX ORDER — VALSARTAN 160 MG/1
TABLET ORAL
Qty: 90 TAB | Refills: 0 | Status: SHIPPED | OUTPATIENT
Start: 2018-03-10 | End: 2018-06-04 | Stop reason: SDUPTHER

## 2018-03-16 RX ORDER — FUROSEMIDE 20 MG/1
TABLET ORAL
Qty: 90 TAB | Refills: 0 | Status: SHIPPED | OUTPATIENT
Start: 2018-03-16 | End: 2018-06-13 | Stop reason: SDUPTHER

## 2018-06-04 RX ORDER — VALSARTAN 160 MG/1
TABLET ORAL
Qty: 90 TAB | Refills: 0 | Status: SHIPPED | OUTPATIENT
Start: 2018-06-04 | End: 2018-06-28 | Stop reason: SDUPTHER

## 2018-06-13 RX ORDER — FUROSEMIDE 20 MG/1
TABLET ORAL
Qty: 90 TAB | Refills: 0 | Status: SHIPPED | OUTPATIENT
Start: 2018-06-13 | End: 2018-09-09 | Stop reason: SDUPTHER

## 2018-06-27 NOTE — PROGRESS NOTES
HISTORY OF PRESENT ILLNESS  Conner Sadler is a 59 y.o. female. HPI   F/u afer visit with EP MD Dr Pace Labs PSVT  They had discussed bariatric surgery to help treat morbid obesity  BMI is > 60  Hx subclinical hyppothyroidism-last tsh 6.4 with normal free t4,, not taking thyroid hormone  Hx corinne on cpap    Patient Active Problem List    Diagnosis Date Noted    Subclinical hypothyroidism 12/19/2017    Morbid obesity with body mass index of 60.0-69.9 in adult [facility]) 03/01/2016    CORINNE (obstructive sleep apnea) 10/11/2010    Essential hypertension, benign 02/20/2010    PSVT (paroxysmal supraventricular tachycardia) ([facility] 02/20/2010    Depression 02/20/2010    Carpal tunnel syndrome 02/20/2010    Pure hypercholesterolemia 02/20/2010     Current Outpatient Prescriptions   Medication Sig Dispense Refill    furosemide (LASIX) 20 mg tablet TAKE 1 TABLET BY MOUTH EVERY DAY 90 Tab 0    valsartan (DIOVAN) 160 mg tablet TAKE 1 TABLET BY MOUTH EVERY DAY 90 Tab 0    NYSTOP powder APPLY EXTERNALLY TO THE AFFECTED AREA FOUR TIMES DAILY 30 g 0    rosuvastatin (CRESTOR) 5 mg tablet TAKE 1 TABLET BY MOUTH DAILY 90 Tab 3    HYDROcodone-acetaminophen (NORCO) 5-325 mg per tablet Take 1 Tab by mouth every eight (8) hours as needed for Pain. Max Daily Amount: 3 Tabs. 21 Tab 0    suvorexant (BELSOMRA) 20 mg tablet Take  by mouth nightly as needed for Insomnia.  verapamil ER (CALAN-SR) 240 mg CR tablet TAKE 1 TABLET BY MOUTH EVERY DAY 90 Tab 3    diazepam (VALIUM) 5 mg tablet Take 1 Tab by mouth three (3) times daily as needed for Anxiety (spasm). Max Daily Amount: 15 mg. 20 Tab 0    VOLTAREN 1 % gel   1    HYDROcodone-acetaminophen (NORCO) 7.5-325 mg per tablet Take 1 Tab by mouth every six (6) hours as needed for Pain. Max Daily Amount: 4 Tabs. 20 Tab 0    oxybutynin chloride XL (DITROPAN XL) 10 mg CR tablet Take 10 mg by mouth daily.  melatonin 3 mg tablet Take  by mouth.  Taking 5 mg         diclofenac EC (VOLTAREN) 50 mg EC tablet Take 1 Tab by mouth two (2) times a day. 30 Tab 1    DIOVAN 160 mg tablet TAKE 1 TABLET BY MOUTH EVERY DAY 30 Tab 0    buPROPion XL (WELLBUTRIN XL) 300 mg XL tablet Take 300 mg by mouth every morning.  cyclobenzaprine (FLEXERIL) 10 mg tablet Take 1 Tab by mouth two (2) times a day. 30 Tab 0    Omeprazole delayed release (PRILOSEC D/R) 20 mg tablet Take 1 Tab by mouth daily. 30 Tab 2    hydrochlorothiazide (HYDRODIURIL) 25 mg tablet Take 25 mg by mouth daily.  flecainide (TAMBOCOR) 100 mg tablet Take 100 mg by mouth daily.  duloxetine (CYMBALTA) 60 mg capsule Take 120 mg by mouth daily. Allergies   Allergen Reactions    Codeine Other (comments)     hallucinates    Lipitor [Atorvastatin] Myalgia    Pcn [Penicillins] Hives      Lab Results  Component Value Date/Time   GFR est non-AA 58 (L) 12/15/2017 11:00 AM   GFRNA, POC 55 (L) 08/04/2010 09:57 AM   GFR est AA 67 12/15/2017 11:00 AM   GFRAA, POC >60 08/04/2010 09:57 AM   Creatinine 1.05 (H) 12/15/2017 11:00 AM   Creatinine (POC) 1.1 08/04/2010 09:57 AM   BUN 15 12/15/2017 11:00 AM   BUN (POC) 17 08/04/2010 09:57 AM   Sodium 140 12/15/2017 11:00 AM   Sodium (POC) 141 08/04/2010 09:57 AM   Potassium 4.0 12/15/2017 11:00 AM   Potassium (POC) 4.1 08/04/2010 09:57 AM   Chloride 100 12/15/2017 11:00 AM   Chloride (POC) 106 08/04/2010 09:57 AM   CO2 27 12/15/2017 11:00 AM        ROS    Physical Exam   Constitutional: She appears well-developed and well-nourished. Appears stated age, morbidly obese, nad, sitting in [facility]   Cardiovascular: Normal rate, regular rhythm and normal heart sounds. Exam reveals no gallop and no friction rub. No murmur heard. Pulmonary/Chest: Effort normal and breath sounds normal. No respiratory distress. She has no wheezes. Abdominal: Soft. Bowel sounds are normal.   Musculoskeletal: She exhibits no edema. Neurological: She is alert. Psychiatric: She has a normal mood and affect. Nursing note and vitals reviewed. ASSESSMENT and PLAN  Diagnoses and all orders for this visit:    1. Morbid obesity ([address]  -     On license of [facility]    2. Hypothyroidism, unspecified type    3. KEISHA (obstructive sleep apnea)  -     [facility]   ? Repeat sleep test, no using cpap   ? pulm HTN--02 sat 91% RA, going for echo tomorrow ordered by Dr Johnston Ranks    4. Breast screening  -     [facility]IE [facility] BI SCREENING INCL CAD; Future    5. Colon cancer screening  -     OCCULT BLOOD, IMMUNOASSAY (FIT)      Follow-up Disposition:  Return in about 3 months (around 9/28/2018) for obesity KEISHA.

## 2018-06-28 ENCOUNTER — OFFICE VISIT (OUTPATIENT)
Dept: INTERNAL MEDICINE CLINIC | Age: 58
End: 2018-06-28

## 2018-06-28 VITALS
HEIGHT: 69 IN | SYSTOLIC BLOOD PRESSURE: 152 MMHG | DIASTOLIC BLOOD PRESSURE: 77 MMHG | BODY MASS INDEX: 43.4 KG/M2 | HEART RATE: 99 BPM | OXYGEN SATURATION: 91 % | WEIGHT: 293 LBS | TEMPERATURE: 98.1 F

## 2018-06-28 DIAGNOSIS — E03.9 HYPOTHYROIDISM, UNSPECIFIED TYPE: ICD-10-CM

## 2018-06-28 DIAGNOSIS — Z12.39 BREAST SCREENING: ICD-10-CM

## 2018-06-28 DIAGNOSIS — E66.01 MORBID OBESITY (HCC): Primary | ICD-10-CM

## 2018-06-28 DIAGNOSIS — G47.33 OSA (OBSTRUCTIVE SLEEP APNEA): ICD-10-CM

## 2018-06-28 DIAGNOSIS — Z12.11 COLON CANCER SCREENING: ICD-10-CM

## 2018-06-28 RX ORDER — GABAPENTIN 100 MG/1
CAPSULE ORAL
Refills: 1 | COMMUNITY
Start: 2018-06-20 | End: 2019-04-03

## 2018-06-28 NOTE — MR AVS SNAPSHOT
[address] [facility] 
[phone] Patient: Twilla Gowers MRN:  [id number] Visit Information Date & Time Provider Department Dept. Phone Encounter #  
 6/28/2018  9:30 AM Ledy Valenciae, [address] [place] [address] [phone] [id number] Follow-up Instructions Return in about 3 months (around 9/28/2018) for obesity KEISHA. Upcoming Health Maintenance Date Due DTaP/Tdap/Td series (1 - Tdap) 4/18/1979 BREAST CANCER SCRN MAMMOGRAM 6/16/2017 PAP AKA CERVICAL CYTOLOGY 9/22/2017 FOBT Q 1 YEAR AGE 50-75 11/10/2017 ZOSTER VACCINE AGE 60> 2/18/2018 Influenza Age 5 to Adult 8/1/2018 Allergies as of 6/28/2018  Review Complete On: 6/28/2018 By: Ronaldo Sahu LPN Severity Noted Reaction Type Reactions Codeine  08/27/2009    Other (comments)  
 hallucinates Lipitor [Atorvastatin]  08/27/2009    Myalgia Pcn [Penicillins]  08/27/2009    Hives Current Immunizations  Reviewed on 11/6/2012 Name Date Influenza Vaccine (Quad) PF 10/16/2017, 9/26/2016 Influenza Vaccine PF 9/9/2013 Influenza Vaccine Split 11/6/2012 11:53 AM  
  
 Not reviewed this visit You Were Diagnosed With   
  
 Codes Comments Morbid [facility] ([facility]    -  Primary ICD-10-CM: E66.01 
ICD-9-CM: 278.01 Hypothyroidism, unspecified type     ICD-10-CM: E03.9 ICD-9-CM: 244.9 KEISHA (obstructive sleep apnea)     ICD-10-CM: G47.33 
ICD-9-CM: 327.23 Breast screening     ICD-10-CM: Z12.31 
ICD-9-CM: V76.10 Colon cancer screening     ICD-10-CM: Z12.11 ICD-9-CM: V76.51 Vitals BP Pulse Temp Height(growth percentile) Weight(growth percentile) SpO2  
 152/77 (BP 1 Location: Left arm, BP Patient Position: Sitting) 99 98.1 °F (36.7 °C) (Oral) 5' 9\" (1.753 m) (!) 445 lb (201.9 kg) 91% BMI OB Status Smoking Status 65.72 kg/m2 Ablation Never Smoker BMI and BSA Data Body Mass Index Body Surface Area 65.72 kg/m 2 3.14 m 2 Preferred Pharmacy Pharmacy Name Phone [facility] [address] [place]  [place] [phone] Your Updated Medication List  
  
   
This list is accurate as of 6/28/18 10:06 AM.  Always use your most recent med list.  
  
  
  
  
 BELSOMRA 20 mg tablet Generic drug:  suvorexant Take  by mouth nightly as needed for Insomnia. cyclobenzaprine 10 mg tablet Commonly known as:  FLEXERIL Take 1 Tab by mouth two (2) times a day. CYMBALTA 60 mg capsule Generic drug:  DULoxetine Take 120 mg by mouth daily. diazePAM 5 mg tablet Commonly known as:  VALIUM Take 1 Tab by mouth three (3) times daily as needed for Anxiety (spasm). Max Daily Amount: 15 mg.  
  
 * diclofenac EC 50 mg EC tablet Commonly known as:  VOLTAREN Take 1 Tab by mouth two (2) times a day. * VOLTAREN 1 % Gel Generic drug:  diclofenac DIOVAN 160 mg tablet Generic drug:  valsartan TAKE 1 TABLET BY MOUTH EVERY DAY  
  
 flecainide 100 mg tablet Commonly known as:  TAMBOCOR Take 100 mg by mouth daily. furosemide 20 mg tablet Commonly known as:  LASIX TAKE 1 TABLET BY MOUTH EVERY DAY  
  
 gabapentin 100 mg capsule Commonly known as:  NEURONTIN  
TK 1 C PO TID  
  
 hydroCHLOROthiazide 25 mg tablet Commonly known as:  HYDRODIURIL Take 25 mg by mouth daily. melatonin 3 mg tablet Take  by mouth. Taking 5 mg NYSTOP powder Generic drug:  nystatin APPLY EXTERNALLY TO THE AFFECTED AREA FOUR TIMES DAILY Omeprazole delayed release 20 mg tablet Commonly known as:  PRILOSEC D/R Take 1 Tab by mouth daily. oxybutynin chloride XL 10 mg CR tablet Commonly known as:  DITROPAN XL Take 10 mg by mouth daily. rosuvastatin 5 mg tablet Commonly known as:  CRESTOR  
TAKE 1 TABLET BY MOUTH DAILY verapamil  mg CR tablet Commonly known as:  CALAN-SR  
TAKE 1 TABLET BY MOUTH EVERY DAY WELLBUTRIN  mg XL tablet Generic drug:  buPROPion XL Take 300 mg by mouth every morning. * Notice: This list has 2 medication(s) that are the same as other medications prescribed for you. Read the directions carefully, and ask your doctor or other care provider to review them with you. We Performed the Following OCCULT BLOOD, IMMUNOASSAY (FIT) [id number] CPT(R)] REFERRAL TO BARIATRIC SURGERY [PRX595 Custom] REFERRAL TO PULMONARY DISEASE [FZK17 Custom] Follow-up Instructions Return in about 3 months (around 9/28/2018) for obesity KEISHA. To-Do List   
 07/13/2018 Imaging:  JUDIE MAMMO BI SCREENING INCL CAD Referral Information Referral ID Referred By Referred To  
  
 [id number] JOSE ELIAS PERALTA [facility] [address] [facility] [address]  [address], [address] [facility] Visits Status Start Date End Date 1 New Request 6/28/18 6/28/19 If your referral has a status of pending review or denied, additional information will be sent to support the outcome of this decision. Referral ID Referred By Referred To  
 [id number] Heath Rojo MD  
   [address] [place] [address] At [facility] [id number] [place] [address], [address] [place] [address] Ave Phone: [phone] Fax: [phone] Visits Status Start Date End Date 1 New Request 6/28/18 6/28/19 If your referral has a status of pending review or denied, additional information will be sent to support the outcome of this decision. Introducing [facility]! Dear Barbara Sofia: Thank you for requesting a [employer] account. Our records indicate that you already have an active [employer] account. You can access your account anytime at [email or web address] Did you know that you can access your hospital and ER discharge instructions at any time in We[employer]? You can also review all of your test results from your hospital stay or ER visit. Additional Information If you have questions, please visit the Frequently Asked Questions section of the [employer] website at [email or web address] Remember, Wecash is NOT to be used for urgent needs. For medical emergencies, dial 911. Now available from your iPhone and Android! Please provide this summary of care documentation to your next provider. Your primary care clinician is listed as Rosalina PERALTA. If you have any questions after today's visit, please call [phone].

## 2018-06-28 NOTE — PROGRESS NOTES
Chief Complaint   Patient presents with    Follow-up     after seeing Cardiologist    Medication Evaluation     Requesting a prescription for oxygen

## 2018-07-05 ENCOUNTER — HOSPITAL ENCOUNTER (OUTPATIENT)
Dept: MAMMOGRAPHY | Age: 58
Discharge: HOME | End: 2018-07-05
Attending: INTERNAL MEDICINE
Payer: COMMERCIAL

## 2018-07-05 DIAGNOSIS — Z12.39 BREAST SCREENING: ICD-10-CM

## 2018-07-05 PROCEDURE — 77067 SCR MAMMO BI INCL CAD: CPT

## 2018-07-30 RX ORDER — LOSARTAN POTASSIUM 50 MG/1
50 TABLET ORAL DAILY
Qty: 90 TAB | Refills: 1 | Status: SHIPPED | OUTPATIENT
Start: 2018-07-30 | End: 2019-01-23 | Stop reason: SDUPTHER

## 2018-07-30 NOTE — TELEPHONE ENCOUNTER
Pt called wanting to speak with Dr. Lino Hackett nurse about a medication she is taking.      Call at [phone]

## 2018-07-30 NOTE — TELEPHONE ENCOUNTER
Spoke with patient after 2 patient identifiers being note and advised she needs an alternate for valsartan, I advised I would send request to covering Md for alternate and send to the pharmacy. Patient expressed understanding and has no further questions at this time.

## 2018-07-31 RX ORDER — VALSARTAN 160 MG/1
TABLET ORAL
Qty: 90 TAB | Refills: 0 | Status: SHIPPED | OUTPATIENT
Start: 2018-07-31 | End: 2019-04-03

## 2018-08-06 NOTE — TELEPHONE ENCOUNTER
Patient states she needs a call back in reference to Valsartan Recall & discussing replacement medication. Please call.  Thank you

## 2018-08-06 NOTE — TELEPHONE ENCOUNTER
Identified patient 2 identifiers verified. Patient called to juany who called in losartan. Patient made aware that Losartan was called in by Dr. Roopa Rosa. No further questions and concerns.

## 2018-09-10 RX ORDER — FUROSEMIDE 20 MG/1
TABLET ORAL
Qty: 90 TAB | Refills: 0 | Status: SHIPPED | OUTPATIENT
Start: 2018-09-10 | End: 2018-12-07 | Stop reason: SDUPTHER

## 2018-09-20 NOTE — TELEPHONE ENCOUNTER
Pt states [facility] was to have sent this request on 9-18-18. We didn't receive. Please fill as soon as you can. Thanks.

## 2018-09-24 RX ORDER — VERAPAMIL HYDROCHLORIDE 240 MG/1
TABLET, FILM COATED, EXTENDED RELEASE ORAL
Qty: 90 TAB | Refills: 3 | Status: SHIPPED | OUTPATIENT
Start: 2018-09-24 | End: 2019-09-17 | Stop reason: SDUPTHER

## 2018-10-11 ENCOUNTER — APPOINTMENT (OUTPATIENT)
Dept: GENERAL RADIOLOGY | Age: 58
End: 2018-10-11
Attending: EMERGENCY MEDICINE
Payer: MEDICARE

## 2018-10-11 ENCOUNTER — HOSPITAL ENCOUNTER (EMERGENCY)
Age: 58
Discharge: HOME | End: 2018-10-11
Attending: EMERGENCY MEDICINE
Payer: MEDICARE

## 2018-10-11 VITALS
OXYGEN SATURATION: 97 % | HEIGHT: 69 IN | HEART RATE: 78 BPM | DIASTOLIC BLOOD PRESSURE: 79 MMHG | WEIGHT: 293 LBS | SYSTOLIC BLOOD PRESSURE: 191 MMHG | RESPIRATION RATE: 22 BRPM | TEMPERATURE: 98.8 F | BODY MASS INDEX: 43.4 KG/M2

## 2018-10-11 DIAGNOSIS — M25.562 ACUTE PAIN OF LEFT KNEE: Primary | ICD-10-CM

## 2018-10-11 PROCEDURE — G8980 MOBILITY D/C STATUS: HCPCS

## 2018-10-11 PROCEDURE — 97530 THERAPEUTIC ACTIVITIES: CPT

## 2018-10-11 PROCEDURE — 73562 X-RAY EXAM OF KNEE 3: CPT

## 2018-10-11 PROCEDURE — 74011250637 HC RX REV CODE- 250/637: Performed by: PHYSICIAN ASSISTANT

## 2018-10-11 PROCEDURE — 99284 EMERGENCY DEPT VISIT MOD MDM: CPT

## 2018-10-11 PROCEDURE — 97161 PT EVAL LOW COMPLEX 20 MIN: CPT

## 2018-10-11 PROCEDURE — G8978 MOBILITY CURRENT STATUS: HCPCS

## 2018-10-11 PROCEDURE — G8979 MOBILITY GOAL STATUS: HCPCS

## 2018-10-11 RX ORDER — OXYCODONE HYDROCHLORIDE 5 MG/1
5 TABLET ORAL
Status: COMPLETED | OUTPATIENT
Start: 2018-10-11 | End: 2018-10-11

## 2018-10-11 RX ORDER — OXYCODONE AND ACETAMINOPHEN 5; 325 MG/1; MG/1
1 TABLET ORAL
Qty: 20 TAB | Refills: 0 | Status: SHIPPED | OUTPATIENT
Start: 2018-10-11 | End: 2019-04-03

## 2018-10-11 RX ADMIN — OXYCODONE HYDROCHLORIDE 5 MG: 5 TABLET ORAL at 13:35

## 2018-10-11 NOTE — PROGRESS NOTES
physical Therapy Emergency Department EVALUATION/DISCHARGE with CMS G codes Patient: Meghana Mcmanus (83 y.o. female) Date: 10/11/2018 Primary Diagnosis: There are no admission diagnoses documented for this encounter. Precautions:    
ASSESSMENT : 
Based on the objective data described below, the patient presents with L knee pain s/p fall, no fx. Asked by LEX Carlisle NP to see pt for eval. 
Pt lives in one story home with multi-step entry with family. She normally walks limitedly in the home mod I with use of SPC. She bathes with sink bath or use of shower seat and some help from family. She has been able to dress self. At this time, pt is received in the w/c. She maintains L knee in extended position due to pain. She is able to come to edge of chair with some assist to slide L foot back to prepare for sit to stand. Pt comes to stand with mod A of PT and min A of . She is able to stand with RW with min A only. She takes 4 steps with cues for proper sequence. Initially, pt doing well but with last 2 steps, pt experienced end range buckling of L knee due to pain requiring increased support on RW and assist from PT. Pt able to sit with min A. Reviewed again proper sequencing and support with UEs for tolerance to WB on LLE. At this time, pt will need a bariatric RW for transfers with assist of family and for progression to amb. She would benefit from HHPT to allow her to progress amb at home and to train in mobility within the limitations of her environment and to train family in safe assist given pt's size. Also, given pt's size and very limited tolerance to LLE WB, she would be unsafe to attempt amb up steps into home and would need transport. Rounded all with CM and NP. CM will meet with family re: the above recommendations. Further acute physical therapy in the ED is not indicated at this time. PLAN : 
Discharge Recommendations:  
 
[x]   Home with family []   Skilled nursing facility []   Admission to hospital with rehab likely needed 
[]   Inpatient rehab referral 
[]   Outpatient physical therapy referral 
[x]   Other:HHPT Further Equipment Recommendations for Discharge:  
[x]   Rolling walker with 5\" wheels - bariatric 
[]   Crutches  
[]   [employer]  
[]   Wheelchair  
[]   Other: COMMUNICATION/EDUCATION:  
Communication/Collaboration: 
[x]   Fall prevention education was provided and the patient/caregiver indicated understanding. [x]   Patient/family have participated as able and agree with findings and recommendations. []   Patient is unable to participate in plan of care at this time. Findings and recommendations were discussed with: MD physician and NP and CM SUBJECTIVE:  
Patient stated It hurts when I put weight on it.  OBJECTIVE DATA SUMMARY:  
 
Past Medical History:  
Diagnosis Date  Cholelithiasis  Hypertension  KEISHA (obstructive sleep apnea) Uses CPAP  
 PSVT (paroxysmal supraventricular tachycardia) ([facility] Paroxysmal Supraventricular Tachycardia  Psychiatric disorder Depression  Unspecified adverse effect of anesthesia May 2006 Postoperative hypoxemia, treated w/ O2 & IV Diuretics Past Surgical History:  
Procedure Laterality Date [address]  HX CHOLECYSTECTOMY  HX CYST INCISION AND DRAINAGE Right 06/2014  HX DILATION AND CURETTAGE  May 2006  HX GYN  2008 Uterine Ablation  HX TONSIL AND ADENOIDECTOMY  REMOVAL GALLBLADDER Prior Level of Function/Home Situation: Pt lives in one story home with multi-step entry with family. She normally walks limitedly in the home mod I with use of SPC. She bathes with sink bath or use of shower seat and some help from family. She has been able to dress self. Home Situation Home Environment: Private residence # Steps to Enter: 4 Rails to Enter: Yes One/Two Story Residence: One story Living Alone: No 
 Support Systems: Child(jv), Family member(s), Spouse/Significant Other/Partner Patient Expects to be Discharged to[de-identified] Private residence Current DME Used/Available at Home: Commode, bedside, Shower chair, Walker, rolling Critical Behavior: 
Neurologic State: Alert Orientation Level: Oriented X4 Cognition: Follows commands Strength:   
Strength: Generally decreased, functional 
  
  
  
  
  
  
Tone & Sensation:  
  
  
  
  
  
Sensation: Intact Range Of Motion: 
AROM: Generally decreased, functional (adipose tissue and pain in L knee limit range) Coordination: 
Coordination: Within functional limits Functional Mobility: 
Bed Mobility: 
  
Supine to Sit:  (Pt received in w/c) Transfers: 
Sit to Stand: Moderate assistance;Minimum assistance;Assist x2 Stand to Sit: Minimum assistance Balance:  
Sitting: Intact Standing: Impaired Standing - Static: Fair (iwth RW) Standing - Dynamic :  (NT due to knee pain) Ambulation/Gait Training: 
Distance (ft):  (4 steps only) Assistive Device: Walker, rolling;Gait belt (bariatric) Ambulation - Level of Assistance: Minimal assistance;Assist x2 Gait Description (WDL):  (4 steps only due to pain in L knee) Gait Abnormalities: Antalgic Stance: Left decreased Speed/Lana: Slow Step Length: Right shortened;Left shortened Functional Measure: 
Barthel Index: 
 
Bathin (sink bath) Bladder: 10 Bowels: 10 
Groomin Dressin Feeding: 10 Mobility: 0 Stairs: 0 Toilet Use: 5 Transfer (Bed to Chair and Back): 5 Total: 55 Barthel and G-code impairment scale: 
Percentage of impairment CH 
0% CI 
1-19% CJ 
20-39% CK 
40-59% CL 
60-79% CM 
80-99% CN 
100% Barthel Score 0-100 100 99-80 79-60 59-40 20-39 1-19 
 0 Barthel Score 0-20 20 17-19 13-16 9-12 5-8 1-4 0 The Barthel ADL Index: Guidelines 1. The index should be used as a record of what a patient does, not as a record of what a patient could do. 2. The main aim is to establish degree of independence from any help, physical or verbal, however minor and for whatever reason. 3. The need for supervision renders the patient not independent. 4. A patient's performance should be established using the best available evidence. Asking the patient, friends/relatives and nurses are the usual sources, but direct observation and common sense are also important. However direct testing is not needed. 5. Usually the patient's performance over the preceding 24-48 hours is important, but occasionally longer periods will be relevant. 6. Middle categories imply that the patient supplies over 50 per cent of the effort. 7. Use of aids to be independent is allowed. Kaz Mcmanus., Barthel, D.W. ([id number]). Functional evaluation: the Barthel Index. [address] [facility] (14)2. Bernice Young katherin ANDREA Yuen, Zhanna Machuca., Madai Matthews., [place], [address] [place] [address] (1999). Measuring the change indisability after inpatient rehabilitation; comparison of the responsiveness of the Barthel Index and Functional [place] Measure. Journal of Neurology, Neurosurgery, and Psychiatry, 66(4), 920-233. Zeke Jackson, N.J.A, BERNARD Alvarado.WINIFRED, & Aleksandra Lorenzo M.A. (2004.) Assessment of post-stroke quality of life in cost-effectiveness studies: The usefulness of the Barthel Index and the EuroQoL-5D. [facility], 13, 420-11 In compliance with CMSs Claims Based Outcome Reporting, the following G-code set was chosen for this patient based on their primary functional limitation being treated: The outcome measure chosen to determine the severity of the functional limitation was the barthel with a score of 55/100 which was correlated with the impairment scale. ? Mobility - Walking and Moving Around:  
  - CURRENT STATUS: CK - 40%-59% impaired, limited or restricted  - GOAL STATUS: CK - 40%-59% impaired, limited or restricted  - D/C STATUS:  CK - 40%-59% impaired, limited or restricted Pain: 
Pain Scale 1: Numeric (0 - 10) Pain Intensity 1: 10 
Pain Location 1: Knee Pain Orientation 1: Left Pain Description 1: Constant Activity Tolerance:  
See above narrative Please refer to the flowsheet for vital signs taken during this treatment. After treatment:  
[x]         Patient left in no apparent distress sitting up in chair 
[]         Patient left in no apparent distress in bed 
[x]         Call bell left within reach [x]         Nursing notified 
[x]         Caregiver present 
[]         Bed alarm activated Thank you for this referral. 
Frank Calvo, PT Time Calculation: 18 mins

## 2018-10-11 NOTE — PROGRESS NOTES
[address] [place] [address] has declined acceptance of case due to lack of staffing. CM has sent referral to [facility] via [employer]. Will update patient regarding agency change once accepctance confirmed. Care Management Interventions PCP Verified by CM: Yes Mode of Transport at Discharge: Other (see comment) (Family - private car) Transition of Care Consult (CM Consult): Home Health [address] [place] [address]: No 
Reason Outside [place] [id number] Chosen: Unable to staff case (Referred to [facility] via [employer]) Discharge Location Discharge Placement: Home with home health [facility]) Ike Mason, RN, BSN, ACM  - Medical Oncology [phone]

## 2018-10-11 NOTE — PROGRESS NOTES
CM met with patient to discuss discharge planning. Patient states she plans to follow-up with orthopedics per ED physician. PT has evaluated patient in ED and has recommended home health PT and rolling walker. CM discussed with patient and offered Freedom of choice - patient has chosen [facility] form signed and placed on chart. Referral sent via CC. Patient states she has a bariatric rolling walker at home already. She does not need a new rolling walker per patient and her . Patient was offered stretcher transport to get home as she has 4 steps into the home. Patient and her  have refusded assistance with transportation set up . Patient's  states he can get patient home via private vehicle and will have family and friends assist them into the home. Message sent to Dr. Aimee OLIVAS regarding plan of care. Ector Pelayo, RN, BSN, ACM  - Medical Oncology [phone]

## 2018-10-11 NOTE — ED NOTES
Discharge instructions reviewed with patient, copy given by BERNABE Juarez. Pt is accomponied by family, denies use of wheelchair.

## 2018-10-11 NOTE — DISCHARGE INSTRUCTIONS
Knee Pain or Injury: Care Instructions  Your Care Instructions    Injuries are a common cause of knee problems. Sudden (acute) injuries may be caused by a direct blow to the knee. They can also be caused by abnormal twisting, bending, or falling on the knee. Pain, bruising, or swelling may be severe, and may start within minutes of the injury. Overuse is another cause of knee pain. Other causes are climbing stairs, kneeling, and other activities that use the knee. Everyday wear and tear, especially as you get older, also can cause knee pain. Rest, along with home treatment, often relieves pain and allows your knee to heal. If you have a serious knee injury, you may need tests and treatment. Follow-up care is a key part of your treatment and safety. Be sure to make and go to all appointments, and call your doctor if you are having problems. It's also a good idea to know your test results and keep a list of the medicines you take. How can you care for yourself at home? · Be safe with medicines. Read and follow all instructions on the label. ¨ If the doctor gave you a prescription medicine for pain, take it as prescribed. ¨ If you are not taking a prescription pain medicine, ask your doctor if you can take an over-the-counter medicine. · Rest and protect your knee. Take a break from any activity that may cause pain. · Put ice or a cold pack on your knee for 10 to 20 minutes at a time. Put a thin cloth between the ice and your skin. · Prop up a sore knee on a pillow when you ice it or anytime you sit or lie down for the next 3 days. Try to keep it above the level of your heart. This will help reduce swelling. · If your knee is not swollen, you can put moist heat, a heating pad, or a warm cloth on your knee. · If your doctor recommends an elastic bandage, sleeve, or other type of support for your knee, wear it as directed.   · Follow your doctor's instructions about how much weight you can put on your leg. Use a cane, crutches, or a walker as instructed. · Follow your doctor's instructions about activity during your healing process. If you can do mild exercise, slowly increase your activity. · Reach and stay at a healthy weight. Extra weight can strain the joints, especially the knees and hips, and make the pain worse. Losing even a few pounds may help. When should you call for help? Call 911 anytime you think you may need emergency care. For example, call if:    · You have symptoms of a blood clot in your lung (called a pulmonary embolism). These may include:  ¨ Sudden chest pain. ¨ Trouble breathing. ¨ Coughing up blood.    Call your doctor now or seek immediate medical care if:    · You have severe or increasing pain.     · Your leg or foot turns cold or changes color.     · You cannot stand or put weight on your knee.     · Your knee looks twisted or bent out of shape.     · You cannot move your knee.     · You have signs of infection, such as:  ¨ Increased pain, swelling, warmth, or redness. ¨ Red streaks leading from the knee. ¨ Pus draining from a place on your knee. ¨ A fever.     · You have signs of a blood clot in your leg (called a deep vein thrombosis), such as:  ¨ Pain in your calf, back of the knee, thigh, or groin. ¨ Redness and swelling in your leg or groin.    Watch closely for changes in your health, and be sure to contact your doctor if:    · You have tingling, weakness, or numbness in your knee.     · You have any new symptoms, such as swelling.     · You have bruises from a knee injury that last longer than 2 weeks.     · You do not get better as expected. Where can you learn more? Go to http://marylou-abdirahman.info/. Enter K195 in the search box to learn more about \"Knee Pain or Injury: Care Instructions. \"  Current as of: November 20, 2017  Content Version: 11.8  © 1591-9482 Healthwise, prettysecrets.  Care instructions adapted under license by [facility] (which disclaims liability or warranty for this information). If you have questions about a medical condition or this instruction, always ask your healthcare professional. [address] any warranty or liability for your use of this information. We hope that we have addressed all of your medical concerns. The examination and treatment you received in the Emergency Department were for an emergent problem and were not intended as complete care. It is important that you follow up with your healthcare provider(s) for ongoing care. If your symptoms worsen or do not improve as expected, and you are unable to reach your usual health care provider(s), you should return to the Emergency Department. [facility] participate in nationally recognized quality of care measures. If your blood pressure is greater than 120/80, as reported below, we urge that you seek medical care to address the potential of high blood pressure, commonly known as hypertension. Hypertension can be hereditary or can be caused by certain medical conditions, pain, stress, or \"white coat syndrome. \"       Please make an appointment with your health care provider(s) for follow up of your Emergency Department visit. VITALS:   Patient Vitals for the past 8 hrs:   Temp Pulse Resp BP SpO2   10/11/18 1203 98.8 °F (37.1 °C) 78 22 191/79 97 %          Thank you for allowing us to provide you with medical care today. We realize that you have many choices for your emergency care needs. Please choose us in the future for any continued health care needs. Negra Shahid, BERNABE              No results found for this or any previous visit (from the past 24 hour(s)). Xr Knee Lt 3 V    Result Date: 10/11/2018  EXAM:  XR KNEE LT 3 V INDICATION:   Left knee pain after fall. COMPARISON: 1/29/2017.  FINDINGS: Three views of the left knee demonstrate no acute fracture or dislocation. There is stable tricompartmental joint space narrowing with osteophytes. There is no effusion. IMPRESSION:  No acute abnormality. Stable tricompartmental degenerative changes.

## 2018-10-11 NOTE — ED NOTES
Assumed care of patient. Patient is alert and oriented, does not appear to be in distress. Patient ambulatory to ED with c/o left knee pain after falling out of her bed yesterday afternoon.

## 2018-10-11 NOTE — ED PROVIDER NOTES
1:28 PM 
CHAUNCEY Bhatia PA-C has evaluated the patient in JET. Pt presents to the ED via EMS with L knee pain following a fall on 10/10/18. Pt states \"I can't walk\". Sees Dr. Shannan Bryan for other orthopedic issues. They have reviewed the vital signs and the triage nurse assessment. They have talked with the patient and any available family and advised that the appropriate studies have been ordered to initiate the work up based on the clinical presentation during the assessment. The pt has been advised that they will be accommodated in the Main ED as soon as possible. The pt has been requested to contact the triage nurse or PIT immediately if they experiences any changes in their condition during this brief waiting period. EMERGENCY DEPARTMENT HISTORY AND PHYSICAL EXAM 
 
 
Date: 10/11/2018 Patient Name: Marylen Sanes History of Presenting Illness Chief Complaint Patient presents with  Knee Pain L knee pain after fall Pt states \"she was asleep\" History Provided By: Patient HPI: Marylen Sanes, 59 y.o. female with PMHx significant for HTN, SVT, KEISHA, and morbid obesity, presents via EMS from home to the ED with cc of left knee pain. She states that she fell out of bed last night and landed on the left knee. She states that she now has severe pain and difficulty bearing weight. No other injuries were sustained. No LOC, CHI, or altered sensorium. Pt denies fevers, chills, night sweats, chest pain, pressure, SOB, VALLEJO, PND, orthopnea, abdominal pain, n/v/d, melena, hematuria, dysuria, constipation, HA, dizziness, and syncope PCP: Ham Black MD 
 
Current Outpatient Prescriptions Medication Sig Dispense Refill  oxyCODONE-acetaminophen (PERCOCET) 5-325 mg per tablet Take 1 Tab by mouth every four (4) hours as needed for Pain. Max Daily Amount: 6 Tabs.  20 Tab 0  
 verapamil ER (CALAN-SR) 240 mg CR tablet TAKE 1 TABLET BY MOUTH EVERY DAY 90 Tab 3  
  furosemide (LASIX) 20 mg tablet TAKE 1 TABLET BY MOUTH EVERY DAY 90 Tab 0  
 valsartan (DIOVAN) 160 mg tablet TAKE 1 TABLET BY MOUTH EVERY DAY 90 Tab 0  
 losartan (COZAAR) 50 mg tablet Take 1 Tab by mouth daily. 90 Tab 1  
 gabapentin (NEURONTIN) 100 mg capsule TK 1 C PO TID  1  
 NYSTOP powder APPLY EXTERNALLY TO THE AFFECTED AREA FOUR TIMES DAILY 30 g 0  
 rosuvastatin (CRESTOR) 5 mg tablet TAKE 1 TABLET BY MOUTH DAILY 90 Tab 3  suvorexant (BELSOMRA) 20 mg tablet Take  by mouth nightly as needed for Insomnia.  diazepam (VALIUM) 5 mg tablet Take 1 Tab by mouth three (3) times daily as needed for Anxiety (spasm). Max Daily Amount: 15 mg. 20 Tab 0  
 VOLTAREN 1 % gel   1  
 oxybutynin chloride XL (DITROPAN XL) 10 mg CR tablet Take 10 mg by mouth daily.  melatonin 3 mg tablet Take  by mouth. Taking 5 mg  diclofenac EC (VOLTAREN) 50 mg EC tablet Take 1 Tab by mouth two (2) times a day. 30 Tab 1  
 DIOVAN 160 mg tablet TAKE 1 TABLET BY MOUTH EVERY DAY 30 Tab 0  
 buPROPion XL (WELLBUTRIN XL) 300 mg XL tablet Take 300 mg by mouth every morning.  cyclobenzaprine (FLEXERIL) 10 mg tablet Take 1 Tab by mouth two (2) times a day. 30 Tab 0  
 Omeprazole delayed release (PRILOSEC D/R) 20 mg tablet Take 1 Tab by mouth daily. 30 Tab 2  
 hydrochlorothiazide (HYDRODIURIL) 25 mg tablet Take 25 mg by mouth daily.  flecainide (TAMBOCOR) 100 mg tablet Take 100 mg by mouth daily.  duloxetine (CYMBALTA) 60 mg capsule Take 120 mg by mouth daily. Past History Past Medical History: 
Past Medical History:  
Diagnosis Date  Cholelithiasis  Hypertension  KEISHA (obstructive sleep apnea) Uses CPAP  
 PSVT (paroxysmal supraventricular tachycardia) ([address] Paroxysmal Supraventricular Tachycardia  Psychiatric disorder Depression  Unspecified adverse effect of anesthesia May 2006 Postoperative hypoxemia, treated w/ O2 & IV Diuretics Past Surgical History: 
Past Surgical History:  
Procedure Laterality Date [address]  HX CHOLECYSTECTOMY  HX CYST INCISION AND DRAINAGE Right 06/2014  HX DILATION AND CURETTAGE  May 2006  HX GYN  2008 Uterine Ablation  HX TONSIL AND ADENOIDECTOMY  REMOVAL GALLBLADDER Family History: 
Family History Problem Relation Age of Onset  Cancer Mother Lung or breast, patient unsure  Heart Attack Father  Diabetes Sister Social History: 
Social History Substance Use Topics  Smoking status: Never Smoker  Smokeless tobacco: Never Used  Alcohol use 0.6 oz/week  
  0 Cans of beer, 0 Shots of liquor, 0 Standard drinks or equivalent, 1 Glasses of wine per week Comment: seldom Allergies: Allergies Allergen Reactions  Codeine Other (comments)  
  hallucinates  Lipitor [Atorvastatin] Myalgia  Pcn [Penicillins] Hives Review of Systems Review of Systems Constitutional: Negative for activity change, appetite change, chills, diaphoresis, fatigue, fever and unexpected weight change. HENT: Negative for congestion, ear pain, rhinorrhea, sinus pressure, sore throat and tinnitus. Eyes: Negative for photophobia, pain, discharge and visual disturbance. Respiratory: Negative for apnea, cough, choking, chest tightness, shortness of breath, wheezing and stridor. Cardiovascular: Negative for chest pain, palpitations and leg swelling. Gastrointestinal: Negative for abdominal pain, constipation, diarrhea, nausea and vomiting. Endocrine: Negative for polydipsia, polyphagia and polyuria. Genitourinary: Negative for decreased urine volume, dyspareunia, dysuria, enuresis, flank pain, frequency, hematuria and urgency. Musculoskeletal: Positive for arthralgias. Negative for back pain, gait problem, myalgias and neck pain. Skin: Negative for color change, pallor, rash and wound. Allergic/Immunologic: Negative for immunocompromised state. Neurological: Negative for dizziness, seizures, syncope, weakness, light-headedness and headaches. Hematological: Does not bruise/bleed easily. Psychiatric/Behavioral: Negative for agitation and confusion. The patient is not nervous/anxious. Physical Exam  
Physical Exam  
Constitutional: She is oriented to person, place, and time. She appears well-developed and well-nourished. No distress. HENT:  
Head: Normocephalic. Right Ear: External ear normal.  
Left Ear: External ear normal.  
Mouth/Throat: Oropharynx is clear and moist. No oropharyngeal exudate. Eyes: Conjunctivae and EOM are normal. Pupils are equal, round, and reactive to light. Right eye exhibits no discharge. Left eye exhibits no discharge. No scleral icterus. Neck: Normal range of motion. Neck supple. No JVD present. No tracheal deviation present. No thyromegaly present. Cardiovascular: Normal rate, regular rhythm, normal heart sounds and intact distal pulses. Exam reveals no gallop and no friction rub. No murmur heard. Pulmonary/Chest: Effort normal and breath sounds normal. No stridor. No respiratory distress. She has no wheezes. She has no rales. She exhibits no tenderness. Abdominal: Soft. Bowel sounds are normal. She exhibits no distension and no mass. There is no tenderness. There is no rebound and no guarding. Musculoskeletal: Normal range of motion. She exhibits no edema or tenderness. Legs: 
Lymphadenopathy:  
  She has no cervical adenopathy. Neurological: She is alert and oriented to person, place, and time. She displays normal reflexes. No cranial nerve deficit. Coordination normal.  
Skin: Skin is warm and dry. No rash noted. She is not diaphoretic. No erythema. No pallor. Psychiatric: She has a normal mood and affect. Her behavior is normal. Judgment and thought content normal.  
Nursing note and vitals reviewed. Diagnostic Study Results Labs - No results found for this or any previous visit (from the past 12 hour(s)). Radiologic Studies -  
XR KNEE LT 3 V Final Result CT Results  (Last 48 hours) None CXR Results  (Last 48 hours) None Medical Decision Making I am the first provider for this patient. I reviewed the vital signs, available nursing notes, past medical history, past surgical history, family history and social history. Vital Signs-Reviewed the patient's vital signs. Patient Vitals for the past 12 hrs: 
 Temp Pulse Resp BP SpO2  
10/11/18 1203 98.8 °F (37.1 °C) 78 22 191/79 97 % Pulse Oximetry Analysis - 97% on RA Cardiac Monitor:  
Rate: 78 bpm 
 
Records Reviewed: Nursing Notes and Old Medical Records Provider Notes (Medical Decision Making): Fall Left knee pain XR knee Analgesia Consult to PT Consult to case management ED Course:  
Initial assessment performed. The patients presenting problems have been discussed, and they are in agreement with the care plan formulated and outlined with them. I have encouraged them to ask questions as they arise throughout their visit. Disposition: 
Discharge to home with Orthopedic follow up PLAN: 
1. Discharge Medication List as of 10/11/2018  3:06 PM  
  
START taking these medications Details  
oxyCODONE-acetaminophen (PERCOCET) 5-325 mg per tablet Take 1 Tab by mouth every four (4) hours as needed for Pain. Max Daily Amount: 6 Tabs., Print, Disp-20 Tab, R-0  
  
  
CONTINUE these medications which have NOT CHANGED  Details  
verapamil ER (CALAN-SR) 240 mg CR tablet TAKE 1 TABLET BY MOUTH EVERY DAY, Normal**Patient requests 90 days supply**Disp-90 Tab, R-3  
  
furosemide (LASIX) 20 mg tablet TAKE 1 TABLET BY MOUTH EVERY DAY, Normal, Disp-90 Tab, R-0  
  
!! valsartan (DIOVAN) 160 mg tablet TAKE 1 TABLET BY MOUTH EVERY DAY, Normal, Disp-90 Tab, R-0  
  
 losartan (COZAAR) 50 mg tablet Take 1 Tab by mouth daily. , Normal, Disp-90 Tab, R-1  
  
gabapentin (NEURONTIN) 100 mg capsule TK 1 C PO TID, Historical Med, R-1  
  
NYSTOP powder APPLY EXTERNALLY TO THE AFFECTED AREA FOUR TIMES DAILY, Normal, Disp-30 g, R-0  
  
rosuvastatin (CRESTOR) 5 mg tablet TAKE 1 TABLET BY MOUTH DAILY, Normal, Disp-90 Tab, R-3  
  
suvorexant (BELSOMRA) 20 mg tablet Take  by mouth nightly as needed for Insomnia., Historical Med  
  
diazepam (VALIUM) 5 mg tablet Take 1 Tab by mouth three (3) times daily as needed for Anxiety (spasm). Max Daily Amount: 15 mg., Print, Disp-20 Tab, R-0  
  
VOLTAREN 1 % gel Historical Med, R-1, [facility]  
  
oxybutynin chloride XL (DITROPAN XL) 10 mg CR tablet Take 10 mg by mouth daily. , Historical Med  
  
melatonin 3 mg tablet Take  by mouth. Taking 5 mg  
, Historical Med  
  
diclofenac EC (VOLTAREN) 50 mg EC tablet Take 1 Tab by mouth two (2) times a day., Normal, Disp-30 Tab, R-1  
  
!! DIOVAN 160 mg tablet TAKE 1 TABLET BY MOUTH EVERY DAY, Normal, Disp-30 Tab, R-0  
  
buPROPion XL (WELLBUTRIN XL) 300 mg XL tablet Take 300 mg by mouth every morning.  , Historical Med  
  
cyclobenzaprine (FLEXERIL) 10 mg tablet Take 1 Tab by mouth two (2) times a day., Normal, Disp-30 Tab, R-0 Omeprazole delayed release (PRILOSEC D/R) 20 mg tablet Take 1 Tab by mouth daily. , Print, Disp-30 Tab, R-2  
  
hydrochlorothiazide (HYDRODIURIL) 25 mg tablet Take 25 mg by mouth daily. , Historical Med  
  
flecainide (TAMBOCOR) 100 mg tablet Take 100 mg by mouth daily. , Historical Med  
  
duloxetine (CYMBALTA) 60 mg capsule Take 120 mg by mouth daily. , Historical Med  
  
 !! - Potential duplicate medications found. Please discuss with provider. 2.  
Follow-up Information Follow up With Details Comments Contact Info Michel Kwon MD In 2 days  [address] [facility] [address] [place] [address] 
[phone] Ann-Marie Ta MD In 2 days  [facility] [address] [place] [facility] 
[phone] [facility] EMERGENCY DEPT  As needed, If symptoms worsen [address] [facility] 
[phone] [address]   [address] 
1st Floor [place] [address] 
[phone] Return to ED if worse Diagnosis Clinical Impression: 1. Acute pain of left knee Attestations: 
 
Mesfin Payne NP 
3:34 PM

## 2018-12-07 RX ORDER — FUROSEMIDE 20 MG/1
TABLET ORAL
Qty: 90 TAB | Refills: 0 | Status: SHIPPED | OUTPATIENT
Start: 2018-12-07 | End: 2019-03-03 | Stop reason: SDUPTHER

## 2019-01-06 RX ORDER — ROSUVASTATIN CALCIUM 5 MG/1
TABLET, COATED ORAL
Qty: 90 TAB | Refills: 0 | Status: SHIPPED | OUTPATIENT
Start: 2019-01-06 | End: 2019-02-03 | Stop reason: SDUPTHER

## 2019-03-04 RX ORDER — FUROSEMIDE 20 MG/1
TABLET ORAL
Qty: 90 TAB | Refills: 0 | Status: SHIPPED | OUTPATIENT
Start: 2019-03-04 | End: 2019-05-26 | Stop reason: SDUPTHER

## 2019-04-01 ENCOUNTER — HOSPITAL ENCOUNTER (OUTPATIENT)
Dept: CT IMAGING | Age: 59
Discharge: HOME | End: 2019-04-01
Attending: PSYCHIATRY & NEUROLOGY
Payer: COMMERCIAL

## 2019-04-01 DIAGNOSIS — R63.2 EXCESSIVE EATING: ICD-10-CM

## 2019-04-01 DIAGNOSIS — R44.1 VISUAL HALLUCINATION: ICD-10-CM

## 2019-04-01 PROCEDURE — 74011000258 HC RX REV CODE- 258: Performed by: RADIOLOGY

## 2019-04-01 PROCEDURE — 74011636320 HC RX REV CODE- 636/320: Performed by: RADIOLOGY

## 2019-04-01 PROCEDURE — 70470 CT HEAD/BRAIN W/O & W/DYE: CPT

## 2019-04-01 RX ORDER — SODIUM CHLORIDE 0.9 % (FLUSH) 0.9 %
10 SYRINGE (ML) INJECTION
Status: COMPLETED | OUTPATIENT
Start: 2019-04-01 | End: 2019-04-01

## 2019-04-01 RX ADMIN — SODIUM CHLORIDE 100 ML: 900 INJECTION, SOLUTION INTRAVENOUS at 11:47

## 2019-04-01 RX ADMIN — Medication 10 ML: at 11:47

## 2019-04-01 RX ADMIN — IOPAMIDOL 100 ML: 612 INJECTION, SOLUTION INTRAVENOUS at 11:47

## 2019-04-02 ENCOUNTER — TELEPHONE (OUTPATIENT)
Dept: INTERNAL MEDICINE CLINIC | Age: 59
End: 2019-04-02

## 2019-04-02 NOTE — TELEPHONE ENCOUNTER
----- Message from Hammad Vaughn sent at 4/2/2019 12:31 PM EDT -----  Regarding: Dr. Kelsy Zepeda  The patient was seen at [facility] and diagnosed with pneumonia. The patient is requesting a call back with a f/up appointment for Friday 4/5/19.  (m)[phone]      Copy/paste envera

## 2019-04-03 ENCOUNTER — HOSPITAL ENCOUNTER (INPATIENT)
Age: 59
LOS: 6 days | Discharge: NURSING, REHAB OR LONG-TERM CARE | DRG: 194 | End: 2019-04-09
Attending: EMERGENCY MEDICINE | Admitting: NEUROMUSCULOSKELETAL MEDICINE & OMM
Payer: MEDICARE

## 2019-04-03 ENCOUNTER — APPOINTMENT (OUTPATIENT)
Dept: CT IMAGING | Age: 59
DRG: 194 | End: 2019-04-03
Attending: EMERGENCY MEDICINE
Payer: MEDICARE

## 2019-04-03 DIAGNOSIS — J96.01 ACUTE RESPIRATORY FAILURE WITH HYPOXEMIA (HCC): ICD-10-CM

## 2019-04-03 DIAGNOSIS — J18.9 COMMUNITY ACQUIRED PNEUMONIA OF RIGHT LOWER LOBE OF LUNG: Primary | ICD-10-CM

## 2019-04-03 DIAGNOSIS — G47.33 OSA (OBSTRUCTIVE SLEEP APNEA): ICD-10-CM

## 2019-04-03 LAB
ALBUMIN SERPL-MCNC: 3 G/DL (ref 3.5–5)
ALBUMIN/GLOB SERPL: 0.7 {RATIO} (ref 1.1–2.2)
ALP SERPL-CCNC: 85 U/L (ref 45–117)
ALT SERPL-CCNC: 28 U/L (ref 12–78)
ANION GAP SERPL CALC-SCNC: 6 MMOL/L (ref 5–15)
AST SERPL-CCNC: 31 U/L (ref 15–37)
BASOPHILS # BLD: 0 K/UL (ref 0–0.1)
BASOPHILS NFR BLD: 0 % (ref 0–1)
BILIRUB SERPL-MCNC: 0.5 MG/DL (ref 0.2–1)
BNP SERPL-MCNC: 314 PG/ML
BUN SERPL-MCNC: 23 MG/DL (ref 6–20)
BUN/CREAT SERPL: 19 (ref 12–20)
CALCIUM SERPL-MCNC: 8.5 MG/DL (ref 8.5–10.1)
CHLORIDE SERPL-SCNC: 104 MMOL/L (ref 97–108)
CO2 SERPL-SCNC: 29 MMOL/L (ref 21–32)
COMMENT, HOLDF: NORMAL
CREAT SERPL-MCNC: 1.19 MG/DL (ref 0.55–1.02)
DIFFERENTIAL METHOD BLD: ABNORMAL
EOSINOPHIL # BLD: 0 K/UL (ref 0–0.4)
EOSINOPHIL NFR BLD: 0 % (ref 0–7)
ERYTHROCYTE [DISTWIDTH] IN BLOOD BY AUTOMATED COUNT: 14.1 % (ref 11.5–14.5)
FLUAV AG NPH QL IA: NEGATIVE
FLUBV AG NOSE QL IA: NEGATIVE
GLOBULIN SER CALC-MCNC: 4.3 G/DL (ref 2–4)
GLUCOSE SERPL-MCNC: 118 MG/DL (ref 65–100)
HCT VFR BLD AUTO: 35.9 % (ref 35–47)
HGB BLD-MCNC: 11 G/DL (ref 11.5–16)
IMM GRANULOCYTES # BLD AUTO: 0 K/UL (ref 0–0.04)
IMM GRANULOCYTES NFR BLD AUTO: 0 % (ref 0–0.5)
LACTATE BLD-SCNC: 0.94 MMOL/L (ref 0.4–2)
LYMPHOCYTES # BLD: 2.2 K/UL (ref 0.8–3.5)
LYMPHOCYTES NFR BLD: 26 % (ref 12–49)
MCH RBC QN AUTO: 29.3 PG (ref 26–34)
MCHC RBC AUTO-ENTMCNC: 30.6 G/DL (ref 30–36.5)
MCV RBC AUTO: 95.5 FL (ref 80–99)
MONOCYTES # BLD: 1.2 K/UL (ref 0–1)
MONOCYTES NFR BLD: 15 % (ref 5–13)
NEUTS SEG # BLD: 5 K/UL (ref 1.8–8)
NEUTS SEG NFR BLD: 59 % (ref 32–75)
NRBC # BLD: 0.03 K/UL (ref 0–0.01)
NRBC BLD-RTO: 0.4 PER 100 WBC
PLATELET # BLD AUTO: 191 K/UL (ref 150–400)
PMV BLD AUTO: 11.5 FL (ref 8.9–12.9)
POTASSIUM SERPL-SCNC: 3.5 MMOL/L (ref 3.5–5.1)
PROT SERPL-MCNC: 7.3 G/DL (ref 6.4–8.2)
RBC # BLD AUTO: 3.76 M/UL (ref 3.8–5.2)
SAMPLES BEING HELD,HOLD: NORMAL
SODIUM SERPL-SCNC: 139 MMOL/L (ref 136–145)
TROPONIN I SERPL-MCNC: <0.05 NG/ML
WBC # BLD AUTO: 8.4 K/UL (ref 3.6–11)

## 2019-04-03 PROCEDURE — 80053 COMPREHEN METABOLIC PANEL: CPT

## 2019-04-03 PROCEDURE — 65660000000 HC RM CCU STEPDOWN

## 2019-04-03 PROCEDURE — 74011636320 HC RX REV CODE- 636/320: Performed by: RADIOLOGY

## 2019-04-03 PROCEDURE — 94761 N-INVAS EAR/PLS OXIMETRY MLT: CPT

## 2019-04-03 PROCEDURE — 74011000258 HC RX REV CODE- 258: Performed by: EMERGENCY MEDICINE

## 2019-04-03 PROCEDURE — 74011000250 HC RX REV CODE- 250: Performed by: EMERGENCY MEDICINE

## 2019-04-03 PROCEDURE — 87040 BLOOD CULTURE FOR BACTERIA: CPT

## 2019-04-03 PROCEDURE — 36415 COLL VENOUS BLD VENIPUNCTURE: CPT

## 2019-04-03 PROCEDURE — 83880 ASSAY OF NATRIURETIC PEPTIDE: CPT

## 2019-04-03 PROCEDURE — 85025 COMPLETE CBC W/AUTO DIFF WBC: CPT

## 2019-04-03 PROCEDURE — 83605 ASSAY OF LACTIC ACID: CPT

## 2019-04-03 PROCEDURE — 93005 ELECTROCARDIOGRAM TRACING: CPT

## 2019-04-03 PROCEDURE — 74011250636 HC RX REV CODE- 250/636: Performed by: EMERGENCY MEDICINE

## 2019-04-03 PROCEDURE — 94640 AIRWAY INHALATION TREATMENT: CPT

## 2019-04-03 PROCEDURE — 84484 ASSAY OF TROPONIN QUANT: CPT

## 2019-04-03 PROCEDURE — 74011250636 HC RX REV CODE- 250/636: Performed by: NEUROMUSCULOSKELETAL MEDICINE & OMM

## 2019-04-03 PROCEDURE — 74011000258 HC RX REV CODE- 258: Performed by: RADIOLOGY

## 2019-04-03 PROCEDURE — 99285 EMERGENCY DEPT VISIT HI MDM: CPT

## 2019-04-03 PROCEDURE — 96374 THER/PROPH/DIAG INJ IV PUSH: CPT

## 2019-04-03 PROCEDURE — 71275 CT ANGIOGRAPHY CHEST: CPT

## 2019-04-03 PROCEDURE — 87804 INFLUENZA ASSAY W/OPTIC: CPT

## 2019-04-03 PROCEDURE — 77030029684 HC NEB SM VOL KT MONA -A

## 2019-04-03 PROCEDURE — 96375 TX/PRO/DX INJ NEW DRUG ADDON: CPT

## 2019-04-03 PROCEDURE — 74011000250 HC RX REV CODE- 250: Performed by: NEUROMUSCULOSKELETAL MEDICINE & OMM

## 2019-04-03 RX ORDER — OXYBUTYNIN CHLORIDE 15 MG/1
15 TABLET, EXTENDED RELEASE ORAL DAILY
COMMUNITY
End: 2022-01-17

## 2019-04-03 RX ORDER — ENOXAPARIN SODIUM 100 MG/ML
40 INJECTION SUBCUTANEOUS EVERY 24 HOURS
Status: DISCONTINUED | OUTPATIENT
Start: 2019-04-03 | End: 2019-04-09 | Stop reason: HOSPADM

## 2019-04-03 RX ORDER — IPRATROPIUM BROMIDE AND ALBUTEROL SULFATE 2.5; .5 MG/3ML; MG/3ML
3 SOLUTION RESPIRATORY (INHALATION)
Status: COMPLETED | OUTPATIENT
Start: 2019-04-03 | End: 2019-04-03

## 2019-04-03 RX ORDER — BENZONATATE 100 MG/1
100 CAPSULE ORAL
COMMUNITY
End: 2019-04-22 | Stop reason: ALTCHOICE

## 2019-04-03 RX ORDER — GABAPENTIN 400 MG/1
400 CAPSULE ORAL 2 TIMES DAILY
COMMUNITY

## 2019-04-03 RX ORDER — SODIUM CHLORIDE 0.9 % (FLUSH) 0.9 %
5-40 SYRINGE (ML) INJECTION AS NEEDED
Status: DISCONTINUED | OUTPATIENT
Start: 2019-04-03 | End: 2019-04-09 | Stop reason: HOSPADM

## 2019-04-03 RX ORDER — LEVOFLOXACIN 5 MG/ML
500 INJECTION, SOLUTION INTRAVENOUS EVERY 24 HOURS
Status: COMPLETED | OUTPATIENT
Start: 2019-04-03 | End: 2019-04-07

## 2019-04-03 RX ORDER — IPRATROPIUM BROMIDE AND ALBUTEROL SULFATE 2.5; .5 MG/3ML; MG/3ML
3 SOLUTION RESPIRATORY (INHALATION)
Status: DISCONTINUED | OUTPATIENT
Start: 2019-04-03 | End: 2019-04-06

## 2019-04-03 RX ORDER — SODIUM CHLORIDE 0.9 % (FLUSH) 0.9 %
5-40 SYRINGE (ML) INJECTION EVERY 8 HOURS
Status: DISCONTINUED | OUTPATIENT
Start: 2019-04-03 | End: 2019-04-09 | Stop reason: HOSPADM

## 2019-04-03 RX ORDER — VANCOMYCIN 2 GRAM/500 ML IN 0.9 % SODIUM CHLORIDE INTRAVENOUS
2000 ONCE
Status: COMPLETED | OUTPATIENT
Start: 2019-04-03 | End: 2019-04-04

## 2019-04-03 RX ORDER — VANCOMYCIN 2 GRAM/500 ML IN 0.9 % SODIUM CHLORIDE INTRAVENOUS
2000
Status: DISCONTINUED | OUTPATIENT
Start: 2019-04-04 | End: 2019-04-04

## 2019-04-03 RX ORDER — METHYLPREDNISOLONE 4 MG/1
TABLET ORAL
COMMUNITY
End: 2019-04-09

## 2019-04-03 RX ORDER — SODIUM CHLORIDE 0.9 % (FLUSH) 0.9 %
10 SYRINGE (ML) INJECTION
Status: COMPLETED | OUTPATIENT
Start: 2019-04-03 | End: 2019-04-03

## 2019-04-03 RX ORDER — DOXYCYCLINE HYCLATE 100 MG
100 TABLET ORAL 2 TIMES DAILY
COMMUNITY
End: 2019-04-09

## 2019-04-03 RX ADMIN — Medication 10 ML: at 19:11

## 2019-04-03 RX ADMIN — IPRATROPIUM BROMIDE AND ALBUTEROL SULFATE 3 ML: .5; 3 SOLUTION RESPIRATORY (INHALATION) at 17:16

## 2019-04-03 RX ADMIN — METHYLPREDNISOLONE SODIUM SUCCINATE 125 MG: 125 INJECTION, POWDER, FOR SOLUTION INTRAMUSCULAR; INTRAVENOUS at 20:11

## 2019-04-03 RX ADMIN — SODIUM CHLORIDE 100 ML: 900 INJECTION, SOLUTION INTRAVENOUS at 19:11

## 2019-04-03 RX ADMIN — AZITHROMYCIN DIHYDRATE 500 MG: 500 INJECTION, POWDER, LYOPHILIZED, FOR SOLUTION INTRAVENOUS at 21:15

## 2019-04-03 RX ADMIN — IOPAMIDOL 100 ML: 755 INJECTION, SOLUTION INTRAVENOUS at 19:10

## 2019-04-03 RX ADMIN — LEVOFLOXACIN 500 MG: 5 INJECTION, SOLUTION INTRAVENOUS at 23:59

## 2019-04-03 RX ADMIN — CEFTRIAXONE SODIUM 2 G: 2 INJECTION, POWDER, FOR SOLUTION INTRAMUSCULAR; INTRAVENOUS at 20:10

## 2019-04-03 RX ADMIN — IPRATROPIUM BROMIDE AND ALBUTEROL SULFATE 3 ML: .5; 3 SOLUTION RESPIRATORY (INHALATION) at 17:12

## 2019-04-03 RX ADMIN — IPRATROPIUM BROMIDE AND ALBUTEROL SULFATE 3 ML: .5; 3 SOLUTION RESPIRATORY (INHALATION) at 21:24

## 2019-04-03 NOTE — ED TRIAGE NOTES
TRIAGE: Pt arrives from home reporting SOB, was seen at [facility] on Monday and dx with PN and cellulitis of LLE. Pt was 88% on arrival, placed on 4L in [address], Pt has labored breathing on arrival, subjective fever

## 2019-04-03 NOTE — ED PROVIDER NOTES
59 y.o. female with past medical history significant for HTN, PSVT, Cholelithiasis, Depression, and KEISHA who presents from home via private vehicle with chief complaint of SOB. Pt's  reports onset \"4 days ago\" of productive cough accompanied by HA and SOB. Pt's  reports SOB and cough have worsened since onset. Pt reports she was seen at [facility] \"two days ago\" where she had chest XRay and was diagnosed with pneumonia and cellulitis of the L leg. Pt reports she was given doxycycline, medrol dose pack, and tessalon pearls. Pt's  reports pt has taken four doses of abx. Pt reports she has been experiencing intermittent edema in her lower extremities for \"a while\" and states that her L leg looks \"normal\". Pt denies seeing anyone for leg swelling. Pt's  reports pt had fever of \"100.6\" two days ago that has since resolved. Pt's  reports pt's SOB is exacerbated by movement. Pt's  reports pt is producing \"dark red and brown\" mucus with cough. Pt's  also notes pt had head CT \"two days ago\". Pt reports history of HTN, high cholesterol, and rapid heart rate. Pt denies taking blood thinners. Pt denies any recent trauma, travel or ill contacts. Pt is currently on 4L O2 via NC due to sats being 88% upon arrival. There are no other acute medical concerns at this time. Social hx: Non smoker; Occasional EtOH use PCP: Mary Ann Trinidad MD 
 
Note written by Saida Clark, as dictated by Ela Juarez MD 4:50 PM 
 
 
The history is provided by the patient and the spouse. No  was used. Past Medical History:  
Diagnosis Date  Cholelithiasis  Hypertension  KEISHA (obstructive sleep apnea) Uses CPAP  
 PSVT (paroxysmal supraventricular tachycardia) ([facility] [address] Paroxysmal Supraventricular Tachycardia  Psychiatric disorder Depression  Unspecified adverse effect of anesthesia May 2006 Postoperative hypoxemia, treated w/ O2 & IV Diuretics Past Surgical History:  
Procedure Laterality Date [address]  HX CHOLECYSTECTOMY  HX CYST INCISION AND DRAINAGE Right 06/2014  HX DILATION AND CURETTAGE  May 2006  HX GYN  2008 Uterine Ablation  HX TONSIL AND ADENOIDECTOMY  REMOVAL GALLBLADDER Family History:  
Problem Relation Age of Onset  Cancer Mother Lung or breast, patient unsure  Heart Attack Father  Diabetes Sister Social History Socioeconomic History  Marital status:  Spouse name: Not on file  Number of children: Not on file  Years of education: Not on file  Highest education level: Not on file Occupational History  Not on file Social Needs  Financial resource strain: Not on file  Food insecurity:  
  Worry: Not on file Inability: Not on file  Transportation needs:  
  Medical: Not on file Non-medical: Not on file Tobacco Use  Smoking status: Never Smoker  Smokeless tobacco: Never Used Substance and Sexual Activity  Alcohol use: Yes Alcohol/week: 0.6 oz Types: 1 Glasses of wine per week Comment: seldom  Drug use: Yes Types: Prescription, OTC  Sexual activity: Yes  
  Partners: Male Birth control/protection: None Lifestyle  Physical activity:  
  Days per week: Not on file Minutes per session: Not on file  Stress: Not on file Relationships  Social connections:  
  Talks on phone: Not on file Gets together: Not on file Attends Taoist service: Not on file Active member of club or organization: Not on file Attends meetings of clubs or organizations: Not on file Relationship status: Not on file  Intimate partner violence:  
  Fear of current or ex partner: Not on file Emotionally abused: Not on file Physically abused: Not on file Forced sexual activity: Not on file Other Topics Concern  Not on file Social History Narrative  Not on file ALLERGIES: Codeine; Lipitor [atorvastatin]; and Pcn [penicillins] Review of Systems Constitutional: Negative for chills and fever. Respiratory: Positive for cough and shortness of breath. Cardiovascular: Positive for leg swelling. Gastrointestinal: Negative for abdominal pain, diarrhea, nausea and vomiting. Genitourinary: Negative for difficulty urinating, dysuria and hematuria. Neurological: Positive for headaches. All other systems reviewed and are negative. Vitals:  
 04/03/19 1606 04/03/19 1647 BP:  133/60 Pulse:  78 Resp:  20 Temp:  98.2 °F (36.8 °C) SpO2: (!) 88% 99% Physical Exam  
Constitutional: She appears well-developed. No distress. Pt is morbidly obese with some increased respiratory effort. HENT:  
Head: Normocephalic and atraumatic. Eyes: Conjunctivae are normal.  
Neck: Neck supple. Cardiovascular: Normal rate and regular rhythm. Pulmonary/Chest: Effort normal. No respiratory distress. She has wheezes. Pt exhibits diffuse inspiratory and expiratory wheezes with lower expiratory component, worse on the left. Abdominal: She exhibits no distension. Musculoskeletal: Normal range of motion. She exhibits no deformity. Neurological: She is alert. No cranial nerve deficit. Skin: Skin is warm and dry. Psychiatric: Her behavior is normal.  
Nursing note and vitals reviewed. Note written by Saida Olsen, as dictated by Marquita Herndon MD 4:50 PM 
 
MDM 
  
59 y.o. female presents with worsening shortness of breath and dark productive sputum. She is hypoxemic to 88% on room air here. She has gotten 4 doses of doxycycline as an outpatient for community acquired pneumonia and LLE cellulitis diagnosed at urgent care by XR on Monday.  She had some leg swelling L>R which is acute on chronic and rust colored sputum so CT performed for PE r/o and there is none but has multifocal opacities on right upper and lower lobes. Covered with IV rocephin and azithro for CAP, provided duo-nebs. Not septic. Notably patient has been noncompliant with home CPAP and KEISHA is a large contributing factor. Hospitalist was consulted for admission and will see the patient in the emergency department. Procedures ED EKG interpretation: 1642 Rhythm: normal sinus rhythm; and regular . Rate (approx.): 76 bpm; Non specific septal T wave abnormalities noted. Note written by Saida Toro, as dictated by Dana Redd MD 5:11 PM 
 
CONSULT NOTE: 
7:51 PM Dana Redd MD communicated with Dr. Whitley Hinton, Consult for Hospitalist via [facility] Text. Discussed available diagnostic tests and clinical findings. Dr. Whitley Hinton will admit the pt. Hospitalist Priscila for Admission 7:46 PM 
 
ED Room Number: BU45/03 Patient Name and age:  Leilani Clay 59 y.o.  female Working Diagnosis: 1. Community acquired pneumonia of right lower lobe of lung ([address] 2. Acute respiratory failure with hypoxemia (HCC) 3. KEISHA (obstructive sleep apnea) Readmission: no 
Isolation Requirements:  no 
Recommended Level of Care:  telemetry Code Status:  Full

## 2019-04-04 LAB
ANION GAP SERPL CALC-SCNC: 9 MMOL/L (ref 5–15)
ATRIAL RATE: 76 BPM
BUN SERPL-MCNC: 19 MG/DL (ref 6–20)
BUN/CREAT SERPL: 18 (ref 12–20)
CALCIUM SERPL-MCNC: 8.1 MG/DL (ref 8.5–10.1)
CALCULATED P AXIS, ECG09: 33 DEGREES
CALCULATED R AXIS, ECG10: 41 DEGREES
CALCULATED T AXIS, ECG11: 77 DEGREES
CHLORIDE SERPL-SCNC: 106 MMOL/L (ref 97–108)
CO2 SERPL-SCNC: 26 MMOL/L (ref 21–32)
CREAT SERPL-MCNC: 1.04 MG/DL (ref 0.55–1.02)
DIAGNOSIS, 93000: NORMAL
ERYTHROCYTE [DISTWIDTH] IN BLOOD BY AUTOMATED COUNT: 14.2 % (ref 11.5–14.5)
GLUCOSE SERPL-MCNC: 157 MG/DL (ref 65–100)
HCT VFR BLD AUTO: 35.6 % (ref 35–47)
HGB BLD-MCNC: 11.1 G/DL (ref 11.5–16)
MCH RBC QN AUTO: 29.4 PG (ref 26–34)
MCHC RBC AUTO-ENTMCNC: 31.2 G/DL (ref 30–36.5)
MCV RBC AUTO: 94.4 FL (ref 80–99)
NRBC # BLD: 0 K/UL (ref 0–0.01)
NRBC BLD-RTO: 0 PER 100 WBC
P-R INTERVAL, ECG05: 172 MS
PLATELET # BLD AUTO: 181 K/UL (ref 150–400)
PMV BLD AUTO: 11 FL (ref 8.9–12.9)
POTASSIUM SERPL-SCNC: 3.8 MMOL/L (ref 3.5–5.1)
Q-T INTERVAL, ECG07: 434 MS
QRS DURATION, ECG06: 96 MS
QTC CALCULATION (BEZET), ECG08: 488 MS
RBC # BLD AUTO: 3.77 M/UL (ref 3.8–5.2)
SODIUM SERPL-SCNC: 141 MMOL/L (ref 136–145)
TSH SERPL DL<=0.05 MIU/L-ACNC: 1.03 UIU/ML (ref 0.36–3.74)
VENTRICULAR RATE, ECG03: 76 BPM
WBC # BLD AUTO: 6.4 K/UL (ref 3.6–11)

## 2019-04-04 PROCEDURE — 80048 BASIC METABOLIC PNL TOTAL CA: CPT

## 2019-04-04 PROCEDURE — 36415 COLL VENOUS BLD VENIPUNCTURE: CPT

## 2019-04-04 PROCEDURE — 74011250636 HC RX REV CODE- 250/636: Performed by: HOSPITALIST

## 2019-04-04 PROCEDURE — 85027 COMPLETE CBC AUTOMATED: CPT

## 2019-04-04 PROCEDURE — 74011250637 HC RX REV CODE- 250/637: Performed by: HOSPITALIST

## 2019-04-04 PROCEDURE — 77030029684 HC NEB SM VOL KT MONA -A

## 2019-04-04 PROCEDURE — 77030027138 HC INCENT SPIROMETER -A

## 2019-04-04 PROCEDURE — 77010033678 HC OXYGEN DAILY

## 2019-04-04 PROCEDURE — 94640 AIRWAY INHALATION TREATMENT: CPT

## 2019-04-04 PROCEDURE — 74011000250 HC RX REV CODE- 250: Performed by: NEUROMUSCULOSKELETAL MEDICINE & OMM

## 2019-04-04 PROCEDURE — 65660000000 HC RM CCU STEPDOWN

## 2019-04-04 PROCEDURE — 84443 ASSAY THYROID STIM HORMONE: CPT

## 2019-04-04 PROCEDURE — 74011250636 HC RX REV CODE- 250/636: Performed by: NEUROMUSCULOSKELETAL MEDICINE & OMM

## 2019-04-04 PROCEDURE — 94664 DEMO&/EVAL PT USE INHALER: CPT

## 2019-04-04 RX ORDER — BENZONATATE 100 MG/1
100 CAPSULE ORAL
Status: DISCONTINUED | OUTPATIENT
Start: 2019-04-04 | End: 2019-04-09 | Stop reason: HOSPADM

## 2019-04-04 RX ORDER — BUPROPION HYDROCHLORIDE 150 MG/1
150 TABLET, EXTENDED RELEASE ORAL EVERY 12 HOURS
Status: DISCONTINUED | OUTPATIENT
Start: 2019-04-04 | End: 2019-04-09 | Stop reason: HOSPADM

## 2019-04-04 RX ORDER — OXYBUTYNIN CHLORIDE 5 MG/1
15 TABLET, EXTENDED RELEASE ORAL DAILY
Status: DISCONTINUED | OUTPATIENT
Start: 2019-04-04 | End: 2019-04-09 | Stop reason: HOSPADM

## 2019-04-04 RX ORDER — ROSUVASTATIN CALCIUM 10 MG/1
5 TABLET, COATED ORAL DAILY
Status: DISCONTINUED | OUTPATIENT
Start: 2019-04-04 | End: 2019-04-09 | Stop reason: HOSPADM

## 2019-04-04 RX ORDER — ZOLPIDEM TARTRATE 5 MG/1
5 TABLET ORAL
Status: DISCONTINUED | OUTPATIENT
Start: 2019-04-04 | End: 2019-04-09 | Stop reason: HOSPADM

## 2019-04-04 RX ORDER — FUROSEMIDE 20 MG/1
20 TABLET ORAL DAILY
Status: DISCONTINUED | OUTPATIENT
Start: 2019-04-04 | End: 2019-04-09 | Stop reason: HOSPADM

## 2019-04-04 RX ORDER — DULOXETIN HYDROCHLORIDE 60 MG/1
120 CAPSULE, DELAYED RELEASE ORAL DAILY
Status: DISCONTINUED | OUTPATIENT
Start: 2019-04-04 | End: 2019-04-09 | Stop reason: HOSPADM

## 2019-04-04 RX ORDER — FLECAINIDE ACETATE 100 MG/1
100 TABLET ORAL DAILY
Status: DISCONTINUED | OUTPATIENT
Start: 2019-04-04 | End: 2019-04-09 | Stop reason: HOSPADM

## 2019-04-04 RX ORDER — VERAPAMIL HYDROCHLORIDE 240 MG/1
240 TABLET, FILM COATED, EXTENDED RELEASE ORAL
Status: DISCONTINUED | OUTPATIENT
Start: 2019-04-05 | End: 2019-04-09 | Stop reason: HOSPADM

## 2019-04-04 RX ORDER — GABAPENTIN 400 MG/1
400 CAPSULE ORAL 3 TIMES DAILY
Status: DISCONTINUED | OUTPATIENT
Start: 2019-04-04 | End: 2019-04-09 | Stop reason: HOSPADM

## 2019-04-04 RX ORDER — LOSARTAN POTASSIUM 50 MG/1
50 TABLET ORAL DAILY
Status: DISCONTINUED | OUTPATIENT
Start: 2019-04-04 | End: 2019-04-09 | Stop reason: HOSPADM

## 2019-04-04 RX ADMIN — VANCOMYCIN HYDROCHLORIDE 2000 MG: 10 INJECTION, POWDER, LYOPHILIZED, FOR SOLUTION INTRAVENOUS at 00:00

## 2019-04-04 RX ADMIN — IPRATROPIUM BROMIDE AND ALBUTEROL SULFATE 3 ML: .5; 3 SOLUTION RESPIRATORY (INHALATION) at 12:46

## 2019-04-04 RX ADMIN — GABAPENTIN 400 MG: 400 CAPSULE ORAL at 15:43

## 2019-04-04 RX ADMIN — IPRATROPIUM BROMIDE AND ALBUTEROL SULFATE 3 ML: .5; 3 SOLUTION RESPIRATORY (INHALATION) at 07:39

## 2019-04-04 RX ADMIN — FLECAINIDE ACETATE 100 MG: 100 TABLET ORAL at 12:34

## 2019-04-04 RX ADMIN — BENZONATATE 100 MG: 100 CAPSULE ORAL at 20:32

## 2019-04-04 RX ADMIN — Medication 5 ML: at 14:00

## 2019-04-04 RX ADMIN — Medication 5 ML: at 21:15

## 2019-04-04 RX ADMIN — IPRATROPIUM BROMIDE AND ALBUTEROL SULFATE 3 ML: .5; 3 SOLUTION RESPIRATORY (INHALATION) at 16:43

## 2019-04-04 RX ADMIN — ROSUVASTATIN CALCIUM 5 MG: 10 TABLET, COATED ORAL at 15:42

## 2019-04-04 RX ADMIN — LEVOFLOXACIN 500 MG: 5 INJECTION, SOLUTION INTRAVENOUS at 21:14

## 2019-04-04 RX ADMIN — BUPROPION HYDROCHLORIDE 150 MG: 150 TABLET, EXTENDED RELEASE ORAL at 12:35

## 2019-04-04 RX ADMIN — IPRATROPIUM BROMIDE AND ALBUTEROL SULFATE 3 ML: .5; 3 SOLUTION RESPIRATORY (INHALATION) at 20:59

## 2019-04-04 RX ADMIN — ENOXAPARIN SODIUM 40 MG: 100 INJECTION SUBCUTANEOUS at 00:00

## 2019-04-04 RX ADMIN — FUROSEMIDE 20 MG: 20 TABLET ORAL at 12:34

## 2019-04-04 RX ADMIN — LOSARTAN POTASSIUM 50 MG: 50 TABLET ORAL at 12:34

## 2019-04-04 RX ADMIN — DULOXETINE HYDROCHLORIDE 120 MG: 60 CAPSULE, DELAYED RELEASE ORAL at 12:34

## 2019-04-04 RX ADMIN — METHYLPREDNISOLONE SODIUM SUCCINATE 60 MG: 125 INJECTION, POWDER, FOR SOLUTION INTRAMUSCULAR; INTRAVENOUS at 20:32

## 2019-04-04 RX ADMIN — ENOXAPARIN SODIUM 40 MG: 100 INJECTION SUBCUTANEOUS at 21:14

## 2019-04-04 RX ADMIN — METHYLPREDNISOLONE SODIUM SUCCINATE 60 MG: 125 INJECTION, POWDER, FOR SOLUTION INTRAMUSCULAR; INTRAVENOUS at 02:44

## 2019-04-04 RX ADMIN — GABAPENTIN 400 MG: 400 CAPSULE ORAL at 21:14

## 2019-04-04 RX ADMIN — METHYLPREDNISOLONE SODIUM SUCCINATE 60 MG: 125 INJECTION, POWDER, FOR SOLUTION INTRAMUSCULAR; INTRAVENOUS at 07:04

## 2019-04-04 RX ADMIN — OXYBUTYNIN CHLORIDE 15 MG: 5 TABLET, EXTENDED RELEASE ORAL at 12:34

## 2019-04-04 NOTE — PROGRESS NOTES
Bedside and Verbal shift change report given to Tabitha Palacios (oncoming nurse) by Elle Person and Netta Ring (offgoing nurse). Report included the following information SBAR, Kardex, ED Summary, Intake/Output and MAR.

## 2019-04-04 NOTE — PROGRESS NOTES
Primary Nurse Riley Donnelly and Ofelia RN performed a dual skin assessment on this patient No impairment noted Rg score is 19

## 2019-04-04 NOTE — H&P
Hospitalist Admission NoteNAME: Wanda Freeman :  1960 MRN:  [id number] Date/Time:  4/3/2019 8:19 PM 
 
Patient PCP: Mary Ann Trinidad MD 
______________________________________________________________________ Given the patient's current clinical presentation, I have a high level of concern for decompensation if discharged from the emergency department. Complex decision making was performed, which includes reviewing the patient's available past medical records, laboratory results, and x-ray films. My assessment of this patient's clinical condition and my plan of care is as follows. Assessment / Plan: 1. Pneumonia - place on iv levaquin. Blood culture. Place on duonebs and supplemental oxygen. Add methylpred 60mg iv q8. 2.KEISHA - not using cpap at home cont supplemental oxygen. 3. ? Le cellulitis - add vancomycin to levaquin. 4. Hx of morbid obesity, choletlithiasis and depression. 5. Hx of hypothyroidism - check tsh. Code Status: full Surrogate Decision Maker: Fransico Godfrey [id number] DVT Prophylaxis: lovenox GI Prophylaxis: not indicated Baseline: home Subjective: CHIEF COMPLAINT: dyspnea HISTORY OF PRESENT ILLNESS:    
Munira Escamilla is a 59 y.o.  female who presents with dyspnea over the last 4 days with tayler colored sputum. The pt has a hisotry of keisha but is noncompliant with cpap. The pt was evaluated at pt first 2 days and was dx with pneumonia and cellulitis and placed on doxycycline. She reports fever of 100.9. She denies any nausea or vomiting. She states she doesn't use oxygen at home. We were asked to admit for work up and evaluation of the above problems. Past Medical History:  
Diagnosis Date  Cholelithiasis  Hypertension  KEISHA (obstructive sleep apnea) Uses CPAP  
 PSVT (paroxysmal supraventricular tachycardia) ([facility] [address] Paroxysmal Supraventricular Tachycardia  Psychiatric disorder Depression  Unspecified adverse effect of anesthesia May 2006 Postoperative hypoxemia, treated w/ O2 & IV Diuretics Past Surgical History:  
Procedure Laterality Date [address]  HX CHOLECYSTECTOMY  HX CYST INCISION AND DRAINAGE Right 06/2014  HX DILATION AND CURETTAGE  May 2006  HX GYN  2008 Uterine Ablation  HX TONSIL AND ADENOIDECTOMY  REMOVAL GALLBLADDER Social History Tobacco Use  Smoking status: Never Smoker  Smokeless tobacco: Never Used Substance Use Topics  Alcohol use: Yes Alcohol/week: 0.6 oz Types: 1 Glasses of wine per week Comment: seldom Family History Problem Relation Age of Onset  Cancer Mother Lung or breast, patient unsure  Heart Attack Father  Diabetes Sister Allergies Allergen Reactions  Codeine Other (comments)  
  hallucinates  Lipitor [Atorvastatin] Myalgia  Pcn [Penicillins] Hives Prior to Admission medications Medication Sig Start Date End Date Taking? Authorizing Provider  
furosemide (LASIX) 20 mg tablet TAKE 1 TABLET BY MOUTH EVERY DAY 3/4/19   Herson Bojorquez MD  
rosuvastatin (CRESTOR) 5 mg tablet TAKE 1 TABLET BY MOUTH DAILY 2/3/19   Salud Holbrook MD  
losartan (COZAAR) 50 mg tablet TAKE 1 TABLET BY MOUTH DAILY 1/23/19   Salud Holbrook MD  
oxyCODONE-acetaminophen (PERCOCET) 5-325 mg per tablet Take 1 Tab by mouth every four (4) hours as needed for Pain. Max Daily Amount: 6 Tabs.  10/11/18   Dennis French NP  
verapamil ER (CALAN-SR) 240 mg CR tablet TAKE 1 TABLET BY MOUTH EVERY DAY 9/24/18   Salud Holbrook MD  
valsartan (DIOVAN) 160 mg tablet TAKE 1 TABLET BY MOUTH EVERY DAY 7/31/18   Herson Bojorquez MD  
gabapentin (NEURONTIN) 100 mg capsule TK 1 C PO TID 6/20/18   Provider, Historical  
NYSTOP powder APPLY EXTERNALLY TO THE AFFECTED AREA FOUR TIMES DAILY 1/26/18   Salud Holbrook MD  
 suvorexant (BELSOMRA) 20 mg tablet Take  by mouth nightly as needed for Insomnia. Provider, Historical  
diazepam (VALIUM) 5 mg tablet Take 1 Tab by mouth three (3) times daily as needed for Anxiety (spasm). Max Daily Amount: 15 mg. 3/9/16   KELSIE Romero  
VOLTAREN 1 % gel  2/24/16   Provider, Historical  
oxybutynin chloride XL (DITROPAN XL) 10 mg CR tablet Take 10 mg by mouth daily. Provider, Historical  
melatonin 3 mg tablet Take  by mouth. Taking 5 mg Provider, Historical  
diclofenac EC (VOLTAREN) 50 mg EC tablet Take 1 Tab by mouth two (2) times a day. 11/6/12   Cholo Bradshaw MD  
DIOVAN 160 mg tablet TAKE 1 TABLET BY MOUTH EVERY DAY 7/18/12   Cholo Bradshaw MD  
buPROPion XL (WELLBUTRIN XL) 300 mg XL tablet Take 300 mg by mouth every morning. Provider, Historical  
cyclobenzaprine (FLEXERIL) 10 mg tablet Take 1 Tab by mouth two (2) times a day. 6/8/11   Cholo Bradshaw MD  
Omeprazole delayed release (PRILOSEC D/R) 20 mg tablet Take 1 Tab by mouth daily. 6/24/10   Cholo Bradshaw MD  
hydrochlorothiazide (HYDRODIURIL) 25 mg tablet Take 25 mg by mouth daily. Provider, Historical  
flecainide (TAMBOCOR) 100 mg tablet Take 100 mg by mouth daily. Provider, Historical  
duloxetine (CYMBALTA) 60 mg capsule Take 120 mg by mouth daily. 7/30/10   Provider, Historical  
 
 
REVIEW OF SYSTEMS:    
I am not able to complete the review of systems because: The patient is intubated and sedated The patient has altered mental status due to his acute medical problems The patient has baseline aphasia from prior stroke(s) The patient has baseline dementia and is not reliable historian The patient is in acute medical distress and unable to provide information Total of 12 systems reviewed as follows:   
   POSITIVE= underlined text  Negative = text not underlined General:  fever, chills, sweats, generalized weakness, weight loss/gain,  
   loss of appetite Eyes:    blurred vision, eye pain, loss of vision, double vision ENT:    rhinorrhea, pharyngitis Respiratory:   cough, sputum production, SOB, VALLEJO, wheezing, pleuritic pain  
Cardiology:   chest pain, palpitations, orthopnea, PND, edema, syncope Gastrointestinal:  abdominal pain , N/V, diarrhea, dysphagia, constipation, bleeding Genitourinary:  frequency, urgency, dysuria, hematuria, incontinence Muskuloskeletal :  arthralgia, myalgia, back pain Hematology:  easy bruising, nose or gum bleeding, lymphadenopathy Dermatological: rash, ulceration, pruritis, color change / jaundice Endocrine:   hot flashes or polydipsia Neurological:  headache, dizziness, confusion, focal weakness, paresthesia, Speech difficulties, memory loss, gait difficulty Psychological: Feelings of anxiety, depression, agitation Objective: VITALS:   
Visit Vitals /85 Pulse 74 Temp 98.2 °F (36.8 °C) Resp 20 SpO2 100% PHYSICAL EXAM: 
 
 
_______________________________________________________________________ Care Plan discussed with: 
  Comments Patient Family RN Care Manager Consultant:     
_______________________________________________________________________ Expected  Disposition:  
Home with Family HH/PT/OT/RN   
SNF/LTC   
JUAN PABLO   
________________________________________________________________________ TOTAL TIME:  30 Minutes Critical Care Provided     Minutes non procedure based Comments Reviewed previous records  
>50% of visit spent in counseling and coordination of care  Discussion with patient and/or family and questions answered 
  
 
________________________________________________________________________ Signed: Jhoan Lat, DO 
 
Procedures: see electronic medical records for all procedures/Xrays and details which were not copied into this note but were reviewed prior to creation of Plan. LAB DATA REVIEWED:   
Recent Results (from the past 24 hour(s)) EKG, 12 LEAD, INITIAL Collection Time: 04/03/19  4:42 PM  
Result Value Ref Range Ventricular Rate 76 BPM  
 Atrial Rate 76 BPM  
 P-R Interval 172 ms QRS Duration 96 ms  
 Q-T Interval 434 ms QTC Calculation (Bezet) 488 ms Calculated P Axis 33 degrees Calculated R Axis 41 degrees Calculated T Axis 77 degrees Diagnosis Normal sinus rhythm Nonspecific ST and T wave abnormality When compared with ECG of 09-FEB-2015 20:21, No significant change was found SAMPLES BEING HELD Collection Time: 04/03/19  4:56 PM  
Result Value Ref Range SAMPLES BEING HELD 1BLU 1RED COMMENT Add-on orders for these samples will be processed based on acceptable specimen integrity and analyte stability, which may vary by analyte. CBC WITH AUTOMATED DIFF Collection Time: 04/03/19  4:56 PM  
Result Value Ref Range WBC 8.4 3.6 - 11.0 K/uL  
 RBC 3.76 (L) 3.80 - 5.20 M/uL  
 HGB 11.0 (L) 11.5 - 16.0 g/dL HCT 35.9 35.0 - 47.0 %  MCV 95.5 80.0 - 99.0 FL  
 MCH 29.3 26.0 - 34.0 PG  
 MCHC 30.6 30.0 - 36.5 g/dL  
 RDW 14.1 11.5 - 14.5 % PLATELET 717 259 - 371 K/uL MPV 11.5 8.9 - 12.9 FL  
 NRBC 0.4 (H) 0  WBC ABSOLUTE NRBC 0.03 (H) 0.00 - 0.01 K/uL NEUTROPHILS 59 32 - 75 % LYMPHOCYTES 26 12 - 49 % MONOCYTES 15 (H) 5 - 13 % EOSINOPHILS 0 0 - 7 % BASOPHILS 0 0 - 1 % IMMATURE GRANULOCYTES 0 0.0 - 0.5 % ABS. NEUTROPHILS 5.0 1.8 - 8.0 K/UL  
 ABS. LYMPHOCYTES 2.2 0.8 - 3.5 K/UL  
 ABS. MONOCYTES 1.2 (H) 0.0 - 1.0 K/UL  
 ABS. EOSINOPHILS 0.0 0.0 - 0.4 K/UL  
 ABS. BASOPHILS 0.0 0.0 - 0.1 K/UL  
 ABS. IMM. GRANS. 0.0 0.00 - 0.04 K/UL  
 DF AUTOMATED METABOLIC PANEL, COMPREHENSIVE Collection Time: 04/03/19  4:56 PM  
Result Value Ref Range Sodium 139 136 - 145 mmol/L Potassium 3.5 3.5 - 5.1 mmol/L Chloride 104 97 - 108 mmol/L  
 CO2 29 21 - 32 mmol/L Anion gap 6 5 - 15 mmol/L Glucose 118 (H) 65 - 100 mg/dL BUN 23 (H) 6 - 20 MG/DL Creatinine 1.19 (H) 0.55 - 1.02 MG/DL  
 BUN/Creatinine ratio 19 12 - 20 GFR est AA 56 (L) >60 ml/min/1.73m2 GFR est non-AA 46 (L) >60 ml/min/1.73m2 Calcium 8.5 8.5 - 10.1 MG/DL Bilirubin, total 0.5 0.2 - 1.0 MG/DL  
 ALT (SGPT) 28 12 - 78 U/L  
 AST (SGOT) 31 15 - 37 U/L Alk. phosphatase 85 45 - 117 U/L Protein, total 7.3 6.4 - 8.2 g/dL Albumin 3.0 (L) 3.5 - 5.0 g/dL Globulin 4.3 (H) 2.0 - 4.0 g/dL A-G Ratio 0.7 (L) 1.1 - 2.2    
TROPONIN I Collection Time: 04/03/19  4:56 PM  
Result Value Ref Range Troponin-I, Qt. <0.05 <0.05 ng/mL NT-PRO BNP Collection Time: 04/03/19  4:56 PM  
Result Value Ref Range NT pro- (H) <125 PG/ML  
INFLUENZA A & B AG (RAPID TEST) Collection Time: 04/03/19  4:56 PM  
Result Value Ref Range Influenza A Antigen NEGATIVE  NEG Influenza B Antigen NEGATIVE  NEG    
POC LACTIC ACID Collection Time: 04/03/19  5:02 PM  
Result Value Ref Range  Lactic Acid (POC) 0.94 0.40 - 2.00 mmol/L

## 2019-04-04 NOTE — ROUTINE PROCESS
TRANSFER - OUT REPORT: 
 
Verbal report given to Shelli Godinez RN(name) on [address]  being transferred to (unit) for routine progression of care Report consisted of patients Situation, Background, Assessment and  
Recommendations(SBAR). Information from the following report(s) SBAR, ED Summary, [facility] and Cardiac Rhythm NSR was reviewed with the receiving nurse. Lines:  
Peripheral IV 04/03/19 Right Forearm (Active) Site Assessment Clean, dry, & intact 4/3/2019  4:54 PM  
Phlebitis Assessment 0 4/3/2019  4:54 PM  
Infiltration Assessment 0 4/3/2019  4:54 PM  
Dressing Status Clean, dry, & intact 4/3/2019  4:54 PM  
  
 
Opportunity for questions and clarification was provided. Patient transported with: 
 Monitor Visit Vitals /81 Pulse 73 Temp 98.3 °F (36.8 °C) Resp 19 SpO2 96%

## 2019-04-04 NOTE — PROGRESS NOTES
Admission Medication Reconciliation: 
Information obtained from: This medication history was obtained from the patient. She brought a list of medications to the hospital and was able to answer clarification questions. An Rx Query is available but only shows data through the end of 2018. I also reviewed her [place] . Summary:  
 
Medications added: benzonatate, doxycycline, methylprednisolone Medications deleted: cyclobenzaprine, diazepam, diclofenac, valsartan, HCTZ, melatonin, nystatin powder, oxycodone-APAP Dose changes: gabapentin 400 mg TID (versus 100 mg TID) Inpatient orders were reviewed and no changes are needed. Chief Complaint for this Admission:  CAP Significant PMH/Disease States:  
Past Medical History:  
Diagnosis Date Cholelithiasis Hypertension KEISHA (obstructive sleep apnea) Uses CPAP  
 PSVT ([facility]oxysmal [facility]cardia[facility] Paroxysmal Supraventricular Tachycardia Psychiatric disorder Depression Unspecified adverse effect of anesthesia May 2006 Postoperative hypoxemia, treated w/ O2 & IV Diuretics Allergies:  Codeine; Lipitor [atorvastatin]; and Pcn [penicillins] Prior to Admission Medications:  
Prior to Admission Medications Prescriptions Last Dose Informant Patient Reported? Taking?  
benzonatate (TESSALON) 100 mg capsule   Yes Yes Sig: Take 100 mg by mouth three (3) times daily as needed for Cough. buPROPion XL (WELLBUTRIN XL) 300 mg XL tablet 4/3/2019 at am  Yes Yes Sig: Take 300 mg by mouth every morning. doxycycline (VIBRA-TABS) 100 mg tablet 4/3/2019 at am  Yes Yes Sig: Take 100 mg by mouth two (2) times a day. (started 4/1/19)  
duloxetine (CYMBALTA) 60 mg capsule 4/3/2019 at am  Yes Yes Sig: Take 120 mg by mouth daily. flecainide (TAMBOCOR) 100 mg tablet 4/3/2019 at am  Yes Yes Sig: Take 100 mg by mouth daily. furosemide (LASIX) 20 mg tablet 4/3/2019 at am  No Yes Sig: TAKE 1 TABLET BY MOUTH EVERY DAY  
gabapentin (NEURONTIN) 400 mg capsule 4/3/2019 at midday  Yes Yes Sig: Take 400 mg by mouth three (3) times daily. losartan (COZAAR) 50 mg tablet 4/3/2019 at am  No Yes Sig: TAKE 1 TABLET BY MOUTH DAILY  
methylPREDNISolone (MEDROL, BABATUNDE,) 4 mg tablet 4/3/2019 at am  Yes Yes Sig: (started 4/1/19) oxybutynin chloride XL (DITROPAN XL) 15 mg CR tablet 4/3/2019 at am  Yes Yes Sig: Take 15 mg by mouth daily. rosuvastatin (CRESTOR) 5 mg tablet 4/3/2019 at am  No Yes Sig: TAKE 1 TABLET BY MOUTH DAILY  
suvorexant (BELSOMRA) 20 mg tablet 4/2/2019 at hs  Yes Yes Sig: Take 20 mg by mouth nightly. verapamil ER (CALAN-SR) 240 mg CR tablet 4/3/2019 at am  No Yes Sig: TAKE 1 TABLET BY MOUTH EVERY DAY Facility-Administered Medications: None Thank you for allowing me to participate in the care of this patient. Please contact the pharmacy () or the medication reconciliation pharmacist () with any questions. Felicitas Duncan, Pharm. D., BCPS, BCPPS

## 2019-04-04 NOTE — CDMP QUERY
Patient is noted to have a BMI of 62.02 kg/m2 ( Ht: 5' 9\" / Wt: 190.5 kg ). If possible, please document in the progress notes and discharge summary if this patient has:  
 
=> Morbid Obesity 
=> Obesity 
=> Overweight (please include evaluation / treatment / management provided) 
=> Other explanation of clinical findings 
=> Clinically Undetermined (no explanation for clinical findings) Thank you, Nate Hinojosa RN 
[facility] 
[phone] REFERENCE: 
The [address] [place] [address] has issued a statement indicating that, \"Individuals who are obese or morbidly obese are at an increased risk for certain medical conditions when compared to persons of normal weight. Therefore, these conditions are always clinically significant and reportable when documented by the provider. \" Morbid Obesity:  BMI >/=40 or BMI >35 with obesity-related health condition  
      (e.g. Type 2 DM, HTN, KEISHA, GERD, depression, OA weight bearing joints, urinary  
              stress incontinence, etc.) Obesity:  BMI 30 - 39.9 Overweight:  BMI 25 - 29.9

## 2019-04-04 NOTE — PROGRESS NOTES
Hospitalist Progress Note Raghu Selby MD 
Answering service: [phone] OR [phone] from in house phone Date of Service:  2019 NAME:  Alexandra Bravo :  1960 MRN:  [id number] Admission Summary:  
Melissa Atkinson is a 59 y.o.  female who presents with dyspnea over the last 4 days with tayler colored sputum. The pt has a hisotry of keisha but is noncompliant with cpap. The pt was evaluated at pt first 2 days and was dx with pneumonia and cellulitis and placed on doxycycline. She reports fever of 100.9. She denies any nausea or vomiting. She states she doesn't use oxygen at home. Interval history / Subjective:  
Pt doing well no SOB on NC O2 d./w RN wean off Assessment & Plan: 1. Pneumonia by CTA   
- place on iv levaquin. Blood culture. - Place on duonebs and supplemental oxygen. Add methylpred 60mg iv q12 
  
2. KEISHA - not using cpap at home cont supplemental oxygen. 
  
3. LE  cellulitis - on Levaquin with d/c vanc no fever or high WBC count  
  
4. Body mass index is 62.02 kg/m². Hx of morbid obesity, choletlithiasis and depression. Resume all home meds  
  
5. Hx of hypothyroidism - TSH WNL 6. She is on anti arrhythmics Tambocor and CCB  - cont for now will f/u with her cardiologist no acute issues 
  
Code Status: full Surrogate Decision Maker: Pamela Montalvor [id number] DVT prophylaxis: Lovenox Care Plan discussed with: Patient/Family and Nurse Disposition: TBD Hospital Problems  Date Reviewed: 12/15/2017 Codes Class Noted POA Pneumonia ICD-10-CM: J18.9 ICD-9-CM: 843  4/3/2019 Unknown Review of Systems: A comprehensive review of systems was negative. Vital Signs:  
 Last 24hrs VS reviewed since prior progress note. Most recent are: 
Visit Vitals /77 Pulse 85 Temp 97.8 °F (36.6 °C) Resp 16 Ht 5' 9\" (1.753 m) Wt (!) 190.5 kg (420 lb) SpO2 96% BMI 62.02 kg/m² Intake/Output Summary (Last 24 hours) at 4/4/2019 1013 Last data filed at 4/4/2019 6959 Gross per 24 hour Intake  Output 200 ml Net -200 ml Physical Examination:  
 
 
     
Constitutional:  No acute distress, cooperative, obese ENT:  Oral mucous moist, oropharynx benign. Neck supple, Resp:  CTA bilaterally. No wheezing/rhonchi/rales. CV:  Regular rhythm, normal rate, no murmurs, gallops, rubs GI:  Soft, non distended, non tender. bs+ Musculoskeletal:  No edema, warm, 2+ pulses throughout Neurologic:  Moves all extremities. AAOx3, CN II-XII reviewed Psych:  Good insight, Not anxious nor agitated. Data Review:  
 I personally reviewed  Image and LABS Labs:  
 
Recent Labs 04/04/19 
0302 04/03/19 
1656 WBC 6.4 8.4 HGB 11.1* 11.0*  
HCT 35.6 35.9  191 Recent Labs 04/04/19 
0302 04/03/19 
1656  139  
K 3.8 3.5  104 CO2 26 29 BUN 19 23* CREA 1.04* 1.19* * 118* CA 8.1* 8.5 Recent Labs 04/03/19 
1656 SGOT 31 ALT 28 AP 85 TBILI 0.5 TP 7.3 ALB 3.0*  
GLOB 4.3* No results for input(s): INR, PTP, APTT in the last 72 hours. No lab exists for component: INREXT No results for input(s): FE, TIBC, PSAT, FERR in the last 72 hours. No results found for: FOL, RBCF No results for input(s): PH, PCO2, PO2 in the last 72 hours. Recent Labs 04/03/19 
1656 TROIQ <0.05 Lab Results Component Value Date/Time Cholesterol, total 159 12/15/2017 11:00 AM  
 HDL Cholesterol 52 12/15/2017 11:00 AM  
 LDL, calculated 91 12/15/2017 11:00 AM  
 Triglyceride 80 12/15/2017 11:00 AM  
 
Lab Results Component Value Date/Time Glucose (POC) 110 (H) 08/04/2010 09:57 AM  
 Glucose (POC) 83 01/27/2009 01:22 PM  
 
Lab Results Component Value Date/Time  Color Yellow 04/29/2015 10:46 AM  
 Appearance Cloudy (A) 04/29/2015 10:46 AM  
 pH (UA) 6.0 04/29/2015 10:46 AM  
 Ketone Negative 04/29/2015 10:46 AM  
 Bilirubin Negative 04/29/2015 10:46 AM  
 Nitrites Negative 04/29/2015 10:46 AM  
 Leukocyte Esterase Negative 04/29/2015 10:46 AM  
 Bacteria None seen 04/29/2015 10:46 AM  
 WBC 0-5 04/29/2015 10:46 AM  
 RBC 0-2 04/29/2015 10:46 AM  
 
 
 
Medications Reviewed:  
 
Current Facility-Administered Medications Medication Dose Route Frequency  benzonatate (TESSALON) capsule 100 mg  100 mg Oral TID PRN  
 buPROPion SR (WELLBUTRIN SR) tablet 150 mg  150 mg Oral Q12H  
 DULoxetine (CYMBALTA) capsule 120 mg  120 mg Oral DAILY  flecainide (TAMBOCOR) tablet 100 mg  100 mg Oral DAILY  furosemide (LASIX) tablet 20 mg  20 mg Oral DAILY  gabapentin (NEURONTIN) capsule 400 mg  400 mg Oral TID  losartan (COZAAR) tablet 50 mg  50 mg Oral DAILY  oxybutynin chloride XL (DITROPAN XL) tablet 15 mg  15 mg Oral DAILY  . PHARMACY TO SUBSTITUTE PER PROTOCOL (Reordered from: suvorexant (BELSOMRA) 20 mg tablet)    Per Protocol  [START ON 4/5/2019] verapamil ER (CALAN-SR) tablet 240 mg  240 mg Oral DAILY WITH BREAKFAST  methylPREDNISolone (PF) (Solu-MEDROL) injection 60 mg  60 mg IntraVENous Q12H  
 sodium chloride (NS) flush 5-40 mL  5-40 mL IntraVENous Q8H  
 sodium chloride (NS) flush 5-40 mL  5-40 mL IntraVENous PRN  
 enoxaparin (LOVENOX) injection 40 mg  40 mg SubCUTAneous Q24H  
 levoFLOXacin (LEVAQUIN) 500 mg in D5W IVPB  500 mg IntraVENous Q24H  
 albuterol-ipratropium (DUO-NEB) 2.5 MG-0.5 MG/3 ML  3 mL Nebulization Q4H RT  
 
______________________________________________________________________ EXPECTED LENGTH OF STAY: - - - 
ACTUAL LENGTH OF STAY:          1 Frank Golden MD

## 2019-04-04 NOTE — PROGRESS NOTES
TRANSFER - IN REPORT: 
 
Verbal report received from Nixon (name) on Alexandra Bravo  being received from ED (unit) for routine progression of care Report consisted of patients Situation, Background, Assessment and  
Recommendations(SBAR). Information from the following report(s) SBAR, Kardex, ED Summary, Intake/Output, MAR and Cardiac Rhythm NSR was reviewed with the receiving nurse. Opportunity for questions and clarification was provided. Assessment completed upon patients arrival to unit and care assumed.

## 2019-04-04 NOTE — PROGRESS NOTES
Pharmacist Note - Vancomycin Dosing Consult provided for this 59 y.o. female for indication of pneumonia/cellulitis. Antibiotic regimen(s): Vanc + Levaquin Recent Labs 19 
1656 WBC 8.4 CREA 1.19* BUN 23* Frequency of BMP: daily x 3 Height: 175.3 cm Weight: 190.5 kg Est CrCl: 92 ml/min; UO: -- ml/kg/hr Temp (24hrs), Av.1 °F (36.7 °C), Min:97.9 °F (36.6 °C), Max:98.3 °F (36.8 °C) Cultures: 
4/3 blood - pending Goal trough = 15 - 20 mcg/mL Therapy will be initiated with a loading dose of 2000 mg IV x 1 to be followed by a maintenance dose of 2000 mg IV every 18 hours. Pharmacy to follow patient daily and order levels / make dose adjustments as appropriate.

## 2019-04-05 LAB
ANION GAP SERPL CALC-SCNC: 6 MMOL/L (ref 5–15)
BUN SERPL-MCNC: 20 MG/DL (ref 6–20)
BUN/CREAT SERPL: 18 (ref 12–20)
CALCIUM SERPL-MCNC: 8.7 MG/DL (ref 8.5–10.1)
CHLORIDE SERPL-SCNC: 106 MMOL/L (ref 97–108)
CO2 SERPL-SCNC: 28 MMOL/L (ref 21–32)
CREAT SERPL-MCNC: 1.11 MG/DL (ref 0.55–1.02)
GLUCOSE SERPL-MCNC: 185 MG/DL (ref 65–100)
POTASSIUM SERPL-SCNC: 4.1 MMOL/L (ref 3.5–5.1)
SODIUM SERPL-SCNC: 140 MMOL/L (ref 136–145)

## 2019-04-05 PROCEDURE — 77010033678 HC OXYGEN DAILY

## 2019-04-05 PROCEDURE — 65660000000 HC RM CCU STEPDOWN

## 2019-04-05 PROCEDURE — 36415 COLL VENOUS BLD VENIPUNCTURE: CPT

## 2019-04-05 PROCEDURE — 94762 N-INVAS EAR/PLS OXIMTRY CONT: CPT

## 2019-04-05 PROCEDURE — 97161 PT EVAL LOW COMPLEX 20 MIN: CPT

## 2019-04-05 PROCEDURE — 97116 GAIT TRAINING THERAPY: CPT

## 2019-04-05 PROCEDURE — 74011250636 HC RX REV CODE- 250/636: Performed by: HOSPITALIST

## 2019-04-05 PROCEDURE — 77030027138 HC INCENT SPIROMETER -A

## 2019-04-05 PROCEDURE — 74011250636 HC RX REV CODE- 250/636: Performed by: NEUROMUSCULOSKELETAL MEDICINE & OMM

## 2019-04-05 PROCEDURE — 80048 BASIC METABOLIC PNL TOTAL CA: CPT

## 2019-04-05 PROCEDURE — 74011250637 HC RX REV CODE- 250/637: Performed by: HOSPITALIST

## 2019-04-05 PROCEDURE — 74011000250 HC RX REV CODE- 250: Performed by: NEUROMUSCULOSKELETAL MEDICINE & OMM

## 2019-04-05 PROCEDURE — 94640 AIRWAY INHALATION TREATMENT: CPT

## 2019-04-05 RX ORDER — INFANT FORMULA WITH IRON
POWDER (GRAM) ORAL DAILY
Status: DISCONTINUED | OUTPATIENT
Start: 2019-04-05 | End: 2019-04-09 | Stop reason: HOSPADM

## 2019-04-05 RX ADMIN — DULOXETINE HYDROCHLORIDE 120 MG: 60 CAPSULE, DELAYED RELEASE ORAL at 08:55

## 2019-04-05 RX ADMIN — LEVOFLOXACIN 500 MG: 5 INJECTION, SOLUTION INTRAVENOUS at 22:00

## 2019-04-05 RX ADMIN — ROSUVASTATIN CALCIUM 5 MG: 10 TABLET, COATED ORAL at 08:54

## 2019-04-05 RX ADMIN — FUROSEMIDE 20 MG: 20 TABLET ORAL at 08:54

## 2019-04-05 RX ADMIN — METHYLPREDNISOLONE SODIUM SUCCINATE 60 MG: 125 INJECTION, POWDER, FOR SOLUTION INTRAMUSCULAR; INTRAVENOUS at 19:16

## 2019-04-05 RX ADMIN — GABAPENTIN 400 MG: 400 CAPSULE ORAL at 15:24

## 2019-04-05 RX ADMIN — METHYLPREDNISOLONE SODIUM SUCCINATE 60 MG: 125 INJECTION, POWDER, FOR SOLUTION INTRAMUSCULAR; INTRAVENOUS at 07:16

## 2019-04-05 RX ADMIN — BUPROPION HYDROCHLORIDE 150 MG: 150 TABLET, EXTENDED RELEASE ORAL at 08:55

## 2019-04-05 RX ADMIN — GABAPENTIN 400 MG: 400 CAPSULE ORAL at 08:55

## 2019-04-05 RX ADMIN — ENOXAPARIN SODIUM 40 MG: 100 INJECTION SUBCUTANEOUS at 22:16

## 2019-04-05 RX ADMIN — GABAPENTIN 400 MG: 400 CAPSULE ORAL at 22:16

## 2019-04-05 RX ADMIN — VERAPAMIL HYDROCHLORIDE 240 MG: 240 TABLET, FILM COATED, EXTENDED RELEASE ORAL at 07:14

## 2019-04-05 RX ADMIN — Medication 10 ML: at 22:17

## 2019-04-05 RX ADMIN — LOSARTAN POTASSIUM 50 MG: 50 TABLET ORAL at 08:55

## 2019-04-05 RX ADMIN — IPRATROPIUM BROMIDE AND ALBUTEROL SULFATE 3 ML: .5; 3 SOLUTION RESPIRATORY (INHALATION) at 08:00

## 2019-04-05 RX ADMIN — IPRATROPIUM BROMIDE AND ALBUTEROL SULFATE 3 ML: .5; 3 SOLUTION RESPIRATORY (INHALATION) at 20:29

## 2019-04-05 RX ADMIN — OXYBUTYNIN CHLORIDE 15 MG: 5 TABLET, EXTENDED RELEASE ORAL at 08:54

## 2019-04-05 RX ADMIN — BENZONATATE 100 MG: 100 CAPSULE ORAL at 09:07

## 2019-04-05 RX ADMIN — Medication 5 ML: at 14:00

## 2019-04-05 RX ADMIN — IPRATROPIUM BROMIDE AND ALBUTEROL SULFATE 3 ML: .5; 3 SOLUTION RESPIRATORY (INHALATION) at 16:00

## 2019-04-05 RX ADMIN — FLECAINIDE ACETATE 100 MG: 100 TABLET ORAL at 08:55

## 2019-04-05 RX ADMIN — IPRATROPIUM BROMIDE AND ALBUTEROL SULFATE 3 ML: .5; 3 SOLUTION RESPIRATORY (INHALATION) at 04:46

## 2019-04-05 RX ADMIN — BUPROPION HYDROCHLORIDE 150 MG: 150 TABLET, EXTENDED RELEASE ORAL at 22:00

## 2019-04-05 RX ADMIN — IPRATROPIUM BROMIDE AND ALBUTEROL SULFATE 3 ML: .5; 3 SOLUTION RESPIRATORY (INHALATION) at 23:44

## 2019-04-05 RX ADMIN — VITAMIN A AND D: 30.8 OINTMENT TOPICAL at 16:00

## 2019-04-05 NOTE — WOUND CARE
WOCN Note:  
 
New consult placed for assessment of left lower leg. Chart reviewed. Admitted DX:  Pneumonia [J18.9] Assessment:  
Patient is A&O x 3, communicative, continent and turns with assistance of 1. Bed: bariatric bed Patient reports no pain. Sacrum, heels and buttocks intact without erythema. Dry skin to feet. Dry intact scab to left lower leg. Recommendations: 
Feet and legs:  Daily apply Vitamin a & d ointment. Skin Care & Pressure Prevention: 
Minimize layers of linen/pads under patient to optimize support surface. Turn/reposition approximately every 2 hours and offload heels. Discussed above plan with patient & RN. Transition of Care: Plan to follow as needed while admitted to hospital. 
 
VAN SalgadoN RN [facility] Wound Care Office [phone] Pager [phone]

## 2019-04-05 NOTE — PROGRESS NOTES
Care Management Interventions PCP Verified by CM: Yes 
Palliative Care Criteria Met (RRAT>21 & CHF Dx)?: No 
Mode of Transport at Discharge: Self Transition of Care Consult (CM Consult): Discharge Planning MyChart Signup: No 
Discharge Durable Medical Equipment: No 
Health Maintenance Reviewed: Yes Physical Therapy Consult: Yes Occupational Therapy Consult: Yes Speech Therapy Consult: No 
Current Support Network: Lives with Spouse Confirm Follow Up Transport: Family Plan discussed with Pt/Family/Caregiver: Yes Freedom of Choice Offered: Yes [employer] Information Provided?: No 
Discharge Location Discharge Placement: Home(TBD) Reason for Admission:   Pneumonia RRAT Score:   7 Plan for utilizing home health:      TBD Current Advanced Directive/Advance Care Plan: Not on File Likelihood of Readmission:  low Transition of Care Plan:    PT and OT ordered today, wean 02. Patient has C-pap at home, but non compliant. CRM will follow for discharge needs.  ARNALDO

## 2019-04-05 NOTE — PROGRESS NOTES
Hospitalist Progress Note Frank Golden MD 
Answering service: [phone] OR [phone] from in house phone Date of Service:  2019 NAME:  Rhea Olivarez :  1960 MRN:  [id number] Admission Summary:  
Nicole Saunders is a 59 y.o.  female who presents with dyspnea over the last 4 days with tayler colored sputum. The pt has a hisotry of keisha but is noncompliant with cpap. The pt was evaluated at pt first 2 days and was dx with pneumonia and cellulitis and placed on doxycycline. She reports fever of 100.9. She denies any nausea or vomiting. She states she doesn't use oxygen at home. Interval history / Subjective:  
Pt doing well no SOB on NC O2 d./w RN wean off Assessment & Plan: 1. Pneumonia by CTA   
- place on iv levaquin. Blood culture NGTD 
- Place on duonebs and supplemental oxygen. Add methylpred 60mg iv q12 taper  
  
2. KEISHA - not using cpap at home cont supplemental oxygen. 
  
3. LE  cellulitis - on Levaquin with d/c vanc no fever or high WBC count  
  
4. Body mass index is 62.02 kg/m². morbid obesity, choletlithiasis and depression. Resume all home meds  
  
5. Hx of hypothyroidism - TSH WNL 6. She is on anti arrhythmics Tambocor and CCB  - cont for now will f/u with her cardiologist no acute issues 
  
Code Status: Full Surrogate Decision Maker: Zahra Peer [id number] DVT prophylaxis: Lovenox Care Plan discussed with: Patient/Family and Nurse Disposition: TBD Hospital Problems  Date Reviewed: 12/15/2017 Codes Class Noted POA Pneumonia ICD-10-CM: J18.9 ICD-9-CM: 044  4/3/2019 Unknown Review of Systems: A comprehensive review of systems was negative. Vital Signs:  
 Last 24hrs VS reviewed since prior progress note. Most recent are: 
Visit Vitals /83 (BP 1 Location: Left arm, BP Patient Position: At rest;Head of bed elevated (Comment degrees)) Pulse 62 Temp 97.7 °F (36.5 °C) Resp 16 Ht 5' 9\" (1.753 m) Wt (!) 190.5 kg (420 lb) SpO2 96% BMI 62.02 kg/m² Intake/Output Summary (Last 24 hours) at 4/5/2019 8934 Last data filed at 4/4/2019 2018 Gross per 24 hour Intake 480 ml Output  Net 480 ml Physical Examination:  
 
 
     
Constitutional:  No acute distress, cooperative, obese ENT:  Oral mucous moist, oropharynx benign. Neck supple, Resp:  CTA bilaterally. No wheezing/rhonchi/rales. CV:  Regular rhythm, normal rate, no murmurs, gallops, rubs GI:  Soft, non distended, non tender. bs+ Musculoskeletal:  No edema, warm, 2+ pulses throughout Neurologic:  Moves all extremities. AAOx3, CN II-XII reviewed Psych:  Good insight, Not anxious nor agitated. Data Review:  
 I personally reviewed  Image and LABS Labs:  
 
Recent Labs 04/04/19 
0302 04/03/19 
1656 WBC 6.4 8.4 HGB 11.1* 11.0*  
HCT 35.6 35.9  191 Recent Labs 04/05/19 
0445 04/04/19 
0302 04/03/19 
1656  141 139  
K 4.1 3.8 3.5  106 104 CO2 28 26 29 BUN 20 19 23* CREA 1.11* 1.04* 1.19* * 157* 118* CA 8.7 8.1* 8.5 Recent Labs 04/03/19 
1656 SGOT 31 ALT 28 AP 85 TBILI 0.5 TP 7.3 ALB 3.0*  
GLOB 4.3* No results for input(s): INR, PTP, APTT in the last 72 hours. No lab exists for component: INREXT, INREXT No results for input(s): FE, TIBC, PSAT, FERR in the last 72 hours. No results found for: FOL, RBCF No results for input(s): PH, PCO2, PO2 in the last 72 hours. Recent Labs 04/03/19 
1656 TROIQ <0.05 Lab Results Component Value Date/Time Cholesterol, total 159 12/15/2017 11:00 AM  
 HDL Cholesterol 52 12/15/2017 11:00 AM  
 LDL, calculated 91 12/15/2017 11:00 AM  
 Triglyceride 80 12/15/2017 11:00 AM  
 
Lab Results Component Value Date/Time  Glucose (POC) 110 (H) 08/04/2010 09:57 AM  
 Glucose (POC) 83 01/27/2009 01:22 PM  
 
Lab Results Component Value Date/Time Color Yellow 04/29/2015 10:46 AM  
 Appearance Cloudy (A) 04/29/2015 10:46 AM  
 pH (UA) 6.0 04/29/2015 10:46 AM  
 Ketone Negative 04/29/2015 10:46 AM  
 Bilirubin Negative 04/29/2015 10:46 AM  
 Nitrites Negative 04/29/2015 10:46 AM  
 Leukocyte Esterase Negative 04/29/2015 10:46 AM  
 Bacteria None seen 04/29/2015 10:46 AM  
 WBC 0-5 04/29/2015 10:46 AM  
 RBC 0-2 04/29/2015 10:46 AM  
 
 
 
Medications Reviewed:  
 
Current Facility-Administered Medications Medication Dose Route Frequency  benzonatate (TESSALON) capsule 100 mg  100 mg Oral TID PRN  
 buPROPion SR (WELLBUTRIN SR) tablet 150 mg  150 mg Oral Q12H  
 DULoxetine (CYMBALTA) capsule 120 mg  120 mg Oral DAILY  flecainide (TAMBOCOR) tablet 100 mg  100 mg Oral DAILY  furosemide (LASIX) tablet 20 mg  20 mg Oral DAILY  gabapentin (NEURONTIN) capsule 400 mg  400 mg Oral TID  losartan (COZAAR) tablet 50 mg  50 mg Oral DAILY  oxybutynin chloride XL (DITROPAN XL) tablet 15 mg  15 mg Oral DAILY  verapamil ER (CALAN-SR) tablet 240 mg  240 mg Oral DAILY WITH BREAKFAST  methylPREDNISolone (PF) (Solu-MEDROL) injection 60 mg  60 mg IntraVENous Q12H  
 zolpidem (AMBIEN) tablet 5 mg  5 mg Oral QHS PRN  
 rosuvastatin (CRESTOR) tablet 5 mg  5 mg Oral DAILY  sodium chloride (NS) flush 5-40 mL  5-40 mL IntraVENous Q8H  
 sodium chloride (NS) flush 5-40 mL  5-40 mL IntraVENous PRN  
 enoxaparin (LOVENOX) injection 40 mg  40 mg SubCUTAneous Q24H  
 levoFLOXacin (LEVAQUIN) 500 mg in D5W IVPB  500 mg IntraVENous Q24H  
 albuterol-ipratropium (DUO-NEB) 2.5 MG-0.5 MG/3 ML  3 mL Nebulization Q4H RT  
 
______________________________________________________________________ EXPECTED LENGTH OF STAY: 3d 7h 
ACTUAL LENGTH OF STAY:          2 Carlos Manuel Khan MD

## 2019-04-05 NOTE — PROGRESS NOTES
Bedside and Verbal shift change report given to Karol Saucedo RN (oncoming nurse) by Trina Carrasco RN (offgoing nurse). Report included the following information SBAR, Kardex, Intake/Output, MAR and Recent Results.

## 2019-04-05 NOTE — PROGRESS NOTES
Spiritual Care Partner Volunteer visited patient in room 557/01 on 19. Documented by: : Rev. Valerie Vaz. Janette Boyd; [facility], to contact [phone] Sanjay Wolfe call: [phone]

## 2019-04-05 NOTE — PROGRESS NOTES
Problem: Mobility Impaired (Adult and Pediatric) Goal: *Acute Goals and Plan of Care (Insert Text) Description Physical Therapy Goals Initiated 4/5/2019 1. Patient will move from supine to sit and sit to supine  in bed with modified independence within 7 day(s). 2.  Patient will transfer from bed to chair and chair to bed with modified independence using the least restrictive device within 7 day(s). 3.  Patient will perform sit to stand with modified independence within 7 day(s). 4.  Patient will ambulate with modified independence for 100 feet with the least restrictive device and O2 >90-95% with or without O2 within 7 day(s). Outcome: Progressing Towards Goal 
  
PHYSICAL THERAPY EVALUATION Patient: Bo Cisneros (70 y.o. female) Date: 4/5/2019 Primary Diagnosis: Pneumonia [J18.9] Precautions: Desaturates without O2    
 
ASSESSMENT :  
Based on the objective data described below, patient presents with Modified independent overall for functional mobility. Gait training completed at Contact guard assistance and Minimum assistance, 50 feet and using a straight cane and hand held assist. Pt demos desaturation from 90-91% on RA sitting to 87% in 25ft, then turned to walk back to bed and in additional 25ft pt desaturated to 80%. Pt continued to desaturate minimally with 3L NCO2 to 79% but then rebounds well, dropped O2 back down to 2L. Notified Rn. The following are barriers to independence while in acute care:  
-Cognitive and/or behavioral: none  
-Medical condition: strength, functional endurance, standing balance and pulmonary tolerance   
-Other:    
 
The patient will benefit from skilled acute intervention to address the above impairments and their rehabilitation potential is considered to be Good Discharge recommendations: Home health (to increase independence and safety) If above is not an option then recommend: None Patient's barriers to discharging home, in addition to above impairments: significant destaturation. Equipment recommendations for successful discharge (if) home: straight cane PLAN : 
Recommendations and Planned Interventions: bed mobility training, transfer training, gait training, therapeutic exercises, neuromuscular re-education, patient and family training/education and therapeutic activities Frequency/Duration: Patient will be followed by physical therapy  5 times a week to address goals. SUBJECTIVE:  
Patient stated ? I am very short of breath. ? OBJECTIVE DATA SUMMARY:  
HISTORY:   
Past Medical History:  
Diagnosis Date Cholelithiasis Hypertension KEISHA (obstructive sleep apnea) Uses CPAP  
 PSVT (paroxysmal supraventricular tachycardia) ([facility] [address] Paroxysmal Supraventricular Tachycardia Psychiatric disorder Depression Unspecified adverse effect of anesthesia May 2006 Postoperative hypoxemia, treated w/ O2 & IV Diuretics Past Surgical History:  
Procedure Laterality Date [address] HX CHOLECYSTECTOMY HX CYST INCISION AND DRAINAGE Right 06/2014 HX DILATION AND CURETTAGE  May 2006 HX GYN  2008 Uterine Ablation HX TONSIL AND ADENOIDECTOMY    
 REMOVAL GALLBLADDER Prior Level of Function/Home Situation: mod I-I Personal factors and/or comorbidities impacting plan of care: see above Home Situation Home Environment: Private residence One/Two Story Residence: One story Living Alone: No 
Support Systems: Family member(s) Patient Expects to be Discharged to[de-identified] Private residence Current DME Used/Available at Home: [employer] EXAMINATION/PRESENTATION/DECISION MAKING:  
Critical Behavior: 
  
  
  
  
Hearing: Auditory Auditory Impairment: None Range Of Motion: 
AROM: Generally decreased, functional 
  
  
  
PROM: Generally decreased, functional 
  
  
  
Strength:   
 Strength: Generally decreased, functional 
  
  
  
  
  
  
Tone & Sensation:  
Tone: Normal 
  
  
  
  
Sensation: Intact Coordination: 
Coordination: Generally decreased, functional 
Vision:  
  
Functional Mobility: 
Bed Mobility: 
  
Supine to Sit: Stand-by assistance(with HOB elevated) Sit to Supine: Minimum assistance(min A for Legs to get into bed) Transfers: 
Sit to Stand: Contact guard assistance Stand to Sit: Contact guard assistance Stand Pivot Transfers: Contact guard assistance Balance:  
Sitting: Intact Standing: Intact; With support Ambulation/Gait Training: 
Distance (ft): 50 Feet (ft) Assistive Device: Cane, straight;Other (comment)(HHA L, cane R) Ambulation - Level of Assistance: Contact guard assistance;Minimal assistance Gait Description (WDL): Exceptions to [facility] Gait Abnormalities: Circumduction; Altered arm swing;Decreased step clearance;Shuffling gait;Trunk sway increased Base of Support: Widened Stance: Right decreased; Left decreased Speed/Lana: Pace decreased (<100 feet/min); Shuffled;Fluctuations Step Length: Right shortened;Left shortened Functional Measure: 
6 MWT results: (Specify if any supplemental oxygen is used, the type, pre, during and post sats.) Distance Walked in Feet (ft): 50 ft. Gloria Rating of Perceived exertion (0-10 point scale):  
Pre Heart Rate: 90 Beginnin Pre O2 Saturation: 91 Mid walk: 8 Post Heart Rate: 120 End: 9 Post O2 Saturation: 80   
Assistive device used: Assistive Device: Cane, straight;Other (comment)(HHA L, cane R) Normative data:  
Men 39-80 years old = 1889 feet; Women 3680 years wor=9360 feet Modified 10 point Gloria RPE scale utilized: 
0 = no breathlessness at all ---> 10 = maximum exertion Please refer to the flowsheet for any additional vital signs taken during this treatment. Physical Therapy Evaluation Charge Determination History Examination Presentation Decision-Making HIGH Complexity :3+ comorbidities / personal factors will impact the outcome/ POC  HIGH Complexity : 4+ Standardized tests and measures addressing body structure, function, activity limitation and / or participation in recreation  LOW Complexity : Stable, uncomplicated  LOW Complexity : FOTO score of  Based on the above components, the patient evaluation is determined to be of the following complexity level: LOW Pain: No pain Activity Tolerance:  
Good, Fair, requires frequent rest breaks and observed SOB with actity Please refer to the flowsheet for vital signs taken during this treatment. After treatment patient left:  
Supine in bed Call light within reach COMMUNICATION/EDUCATION:  
The patient?s plan of care was discussed with: . Fall prevention education was provided and the patient/caregiver indicated understanding. and Patient/family have participated as able in goal setting and plan of care. Thank you for this referral. 
Sharon Vernon, PT Time Calculation: 22 mins

## 2019-04-05 NOTE — PROGRESS NOTES
Bedside and Verbal shift change report given to Linsey Koenig RN (oncoming nurse) by Carlito Ibarra RN (offgoing nurse). Report included the following information SBAR, Kardex and MAR.

## 2019-04-06 LAB
ANION GAP SERPL CALC-SCNC: 7 MMOL/L (ref 5–15)
BUN SERPL-MCNC: 27 MG/DL (ref 6–20)
BUN/CREAT SERPL: 21 (ref 12–20)
CALCIUM SERPL-MCNC: 8.7 MG/DL (ref 8.5–10.1)
CHLORIDE SERPL-SCNC: 105 MMOL/L (ref 97–108)
CO2 SERPL-SCNC: 26 MMOL/L (ref 21–32)
CREAT SERPL-MCNC: 1.31 MG/DL (ref 0.55–1.02)
GLUCOSE SERPL-MCNC: 203 MG/DL (ref 65–100)
POTASSIUM SERPL-SCNC: 3.7 MMOL/L (ref 3.5–5.1)
SODIUM SERPL-SCNC: 138 MMOL/L (ref 136–145)

## 2019-04-06 PROCEDURE — 74011250636 HC RX REV CODE- 250/636: Performed by: NEUROMUSCULOSKELETAL MEDICINE & OMM

## 2019-04-06 PROCEDURE — 74011250636 HC RX REV CODE- 250/636: Performed by: HOSPITALIST

## 2019-04-06 PROCEDURE — 97165 OT EVAL LOW COMPLEX 30 MIN: CPT

## 2019-04-06 PROCEDURE — 36415 COLL VENOUS BLD VENIPUNCTURE: CPT

## 2019-04-06 PROCEDURE — 97535 SELF CARE MNGMENT TRAINING: CPT

## 2019-04-06 PROCEDURE — 94640 AIRWAY INHALATION TREATMENT: CPT

## 2019-04-06 PROCEDURE — 77010033678 HC OXYGEN DAILY

## 2019-04-06 PROCEDURE — 65660000000 HC RM CCU STEPDOWN

## 2019-04-06 PROCEDURE — 74011000250 HC RX REV CODE- 250: Performed by: NEUROMUSCULOSKELETAL MEDICINE & OMM

## 2019-04-06 PROCEDURE — 80048 BASIC METABOLIC PNL TOTAL CA: CPT

## 2019-04-06 PROCEDURE — 74011250637 HC RX REV CODE- 250/637: Performed by: HOSPITALIST

## 2019-04-06 RX ORDER — IPRATROPIUM BROMIDE AND ALBUTEROL SULFATE 2.5; .5 MG/3ML; MG/3ML
3 SOLUTION RESPIRATORY (INHALATION)
Status: DISCONTINUED | OUTPATIENT
Start: 2019-04-06 | End: 2019-04-09 | Stop reason: HOSPADM

## 2019-04-06 RX ADMIN — BUPROPION HYDROCHLORIDE 150 MG: 150 TABLET, EXTENDED RELEASE ORAL at 09:07

## 2019-04-06 RX ADMIN — GABAPENTIN 400 MG: 400 CAPSULE ORAL at 15:34

## 2019-04-06 RX ADMIN — IPRATROPIUM BROMIDE AND ALBUTEROL SULFATE 3 ML: .5; 3 SOLUTION RESPIRATORY (INHALATION) at 04:00

## 2019-04-06 RX ADMIN — DULOXETINE HYDROCHLORIDE 120 MG: 60 CAPSULE, DELAYED RELEASE ORAL at 09:03

## 2019-04-06 RX ADMIN — Medication 10 ML: at 15:35

## 2019-04-06 RX ADMIN — OXYBUTYNIN CHLORIDE 15 MG: 5 TABLET, EXTENDED RELEASE ORAL at 09:03

## 2019-04-06 RX ADMIN — LEVOFLOXACIN 500 MG: 5 INJECTION, SOLUTION INTRAVENOUS at 21:50

## 2019-04-06 RX ADMIN — BENZONATATE 100 MG: 100 CAPSULE ORAL at 22:14

## 2019-04-06 RX ADMIN — Medication 10 ML: at 22:44

## 2019-04-06 RX ADMIN — METHYLPREDNISOLONE SODIUM SUCCINATE 60 MG: 125 INJECTION, POWDER, FOR SOLUTION INTRAMUSCULAR; INTRAVENOUS at 21:28

## 2019-04-06 RX ADMIN — FUROSEMIDE 20 MG: 20 TABLET ORAL at 09:03

## 2019-04-06 RX ADMIN — VITAMIN A AND D: 30.8 OINTMENT TOPICAL at 09:06

## 2019-04-06 RX ADMIN — Medication 10 ML: at 06:00

## 2019-04-06 RX ADMIN — LOSARTAN POTASSIUM 50 MG: 50 TABLET ORAL at 09:03

## 2019-04-06 RX ADMIN — IPRATROPIUM BROMIDE AND ALBUTEROL SULFATE 3 ML: .5; 3 SOLUTION RESPIRATORY (INHALATION) at 07:44

## 2019-04-06 RX ADMIN — FLECAINIDE ACETATE 100 MG: 100 TABLET ORAL at 09:04

## 2019-04-06 RX ADMIN — BUPROPION HYDROCHLORIDE 150 MG: 150 TABLET, EXTENDED RELEASE ORAL at 21:34

## 2019-04-06 RX ADMIN — ROSUVASTATIN CALCIUM 5 MG: 10 TABLET, COATED ORAL at 09:04

## 2019-04-06 RX ADMIN — ENOXAPARIN SODIUM 40 MG: 100 INJECTION SUBCUTANEOUS at 22:17

## 2019-04-06 RX ADMIN — GABAPENTIN 400 MG: 400 CAPSULE ORAL at 21:34

## 2019-04-06 RX ADMIN — VERAPAMIL HYDROCHLORIDE 240 MG: 240 TABLET, FILM COATED, EXTENDED RELEASE ORAL at 09:03

## 2019-04-06 RX ADMIN — GABAPENTIN 400 MG: 400 CAPSULE ORAL at 09:04

## 2019-04-06 RX ADMIN — METHYLPREDNISOLONE SODIUM SUCCINATE 60 MG: 125 INJECTION, POWDER, FOR SOLUTION INTRAMUSCULAR; INTRAVENOUS at 09:03

## 2019-04-06 NOTE — PROGRESS NOTES
Bedside and Verbal shift change report given to Melita Goltz, RN (oncoming nurse) by Adwoa Stapleton RN (offgoing nurse). Report included the following information SBAR, Kardex and MAR.

## 2019-04-06 NOTE — PROGRESS NOTES
Bedside and Verbal shift change report given to Cami (oncoming nurse) by Brittani Hammond (offgoing nurse). Report included the following information SBAR, Kardex, Intake/Output and MAR.

## 2019-04-06 NOTE — PROGRESS NOTES
Hospitalist Progress Note Ross Guadarrama MD 
Answering service: [phone] OR [phone] from in house phone Date of Service:  2019 NAME:  Bhavik Balderas :  1960 MRN:  [id number] Admission Summary:  
Clarice Milton is a 59 y.o.  female who presents with dyspnea over the last 4 days with tayler colored sputum. The pt has a hisotry of keisha but is noncompliant with cpap. The pt was evaluated at pt first 2 days and was dx with pneumonia and cellulitis and placed on doxycycline. She reports fever of 100.9. She denies any nausea or vomiting. She states she doesn't use oxygen at home. Interval history / Subjective: She gets SOB very quickly and needed o2 Assessment & Plan: 1. Pneumonia by CTA   
- place on iv levaquin. Blood culture NGTD 
- Place on duonebs and supplemental oxygen. Add methylpred 60mg daily  
  
2. KEISHA - not using cpap at home cont supplemental oxygen. - need ourt pt sleep study  
  
3. LE  cellulitis - on Levaquin with d/c vanc no fever or high WBC count  
  
4. Body mass index is 62.02 kg/m². morbid obesity, choletlithiasis and depression. Resume all home meds  
  
5. Hx of hypothyroidism - TSH WNL 6. She is on anti arrhythmics Tambocor and CCB  - cont for now will f/u with her cardiologist no acute issues 
  
Code Status: Full Surrogate Decision Maker: Peter Luther [id number] DVT prophylaxis: Lovenox Care Plan discussed with: Patient/Family and Nurse Disposition: TBD Hospital Problems  Date Reviewed: 12/15/2017 Codes Class Noted POA Pneumonia ICD-10-CM: J18.9 ICD-9-CM: 649  4/3/2019 Unknown Review of Systems: A comprehensive review of systems was negative. Cta Chest W Or W Wo Cont Result Date: 4/3/2019 IMPRESSION: There is no proximal pulmonary embolism.  There is no aortic aneurysm or dissection. Image quality is degraded by phase of contrast and patient body habitus. Atelectasis/consolidation in the right lower and right upper lobes with associated groundglass opacity. Imaging findings are most consistent with a multifocal pneumonia. Infectious and/or inflammatory in etiology. Vital Signs:  
 Last 24hrs VS reviewed since prior progress note. Most recent are: 
Visit Vitals /79 (BP 1 Location: Left arm, BP Patient Position: Supine; Post activity) Pulse 89 Temp 98 °F (36.7 °C) Resp 24 Ht 5' 9\" (1.753 m) Wt (!) 190.5 kg (420 lb) SpO2 92% BMI 62.02 kg/m² Intake/Output Summary (Last 24 hours) at 4/6/2019 1431 Last data filed at 4/6/2019 1300 Gross per 24 hour Intake 580 ml Output 700 ml Net -120 ml Physical Examination:  
 
 
     
Constitutional:  No acute distress, cooperative, obese ENT:  Oral mucous moist, oropharynx benign. Neck supple, Resp:  CTA bilaterally. No wheezing/rhonchi/rales. CV:  Regular rhythm, normal rate, no murmurs, gallops, rubs GI:  Soft, non distended, non tender. bs+ Musculoskeletal:  No edema, warm, 2+ pulses throughout Neurologic:  Moves all extremities. AAOx3, CN II-XII reviewed Psych:  Good insight, Not anxious nor agitated. Data Review:  
 I personally reviewed  Image and LABS Labs:  
 
Recent Labs 04/04/19 
0302 04/03/19 
1656 WBC 6.4 8.4 HGB 11.1* 11.0*  
HCT 35.6 35.9  191 Recent Labs 04/06/19 
9624 04/05/19 
0445 04/04/19 
0302  140 141  
K 3.7 4.1 3.8  106 106 CO2 26 28 26 BUN 27* 20 19 CREA 1.31* 1.11* 1.04* * 185* 157* CA 8.7 8.7 8.1* Recent Labs 04/03/19 
1656 SGOT 31 ALT 28 AP 85 TBILI 0.5 TP 7.3 ALB 3.0*  
GLOB 4.3* No results for input(s): INR, PTP, APTT in the last 72 hours.  
 
No lab exists for component: INREXT, INREXT  
 No results for input(s): FE, TIBC, PSAT, FERR in the last 72 hours. No results found for: FOL, RBCF No results for input(s): PH, PCO2, PO2 in the last 72 hours. Recent Labs 04/03/19 
1656 TROIQ <0.05 Lab Results Component Value Date/Time Cholesterol, total 159 12/15/2017 11:00 AM  
 HDL Cholesterol 52 12/15/2017 11:00 AM  
 LDL, calculated 91 12/15/2017 11:00 AM  
 Triglyceride 80 12/15/2017 11:00 AM  
 
Lab Results Component Value Date/Time Glucose (POC) 110 (H) 08/04/2010 09:57 AM  
 Glucose (POC) 83 01/27/2009 01:22 PM  
 
Lab Results Component Value Date/Time Color Yellow 04/29/2015 10:46 AM  
 Appearance Cloudy (A) 04/29/2015 10:46 AM  
 pH (UA) 6.0 04/29/2015 10:46 AM  
 Ketone Negative 04/29/2015 10:46 AM  
 Bilirubin Negative 04/29/2015 10:46 AM  
 Nitrites Negative 04/29/2015 10:46 AM  
 Leukocyte Esterase Negative 04/29/2015 10:46 AM  
 Bacteria None seen 04/29/2015 10:46 AM  
 WBC 0-5 04/29/2015 10:46 AM  
 RBC 0-2 04/29/2015 10:46 AM  
 
 
 
Medications Reviewed:  
 
Current Facility-Administered Medications Medication Dose Route Frequency  albuterol-ipratropium (DUO-NEB) 2.5 MG-0.5 MG/3 ML  3 mL Nebulization Q4H PRN  
 vits A and D-white pet-lanolin (A&D) ointment   Topical DAILY  benzonatate (TESSALON) capsule 100 mg  100 mg Oral TID PRN  
 buPROPion SR (WELLBUTRIN SR) tablet 150 mg  150 mg Oral Q12H  
 DULoxetine (CYMBALTA) capsule 120 mg  120 mg Oral DAILY  flecainide (TAMBOCOR) tablet 100 mg  100 mg Oral DAILY  furosemide (LASIX) tablet 20 mg  20 mg Oral DAILY  gabapentin (NEURONTIN) capsule 400 mg  400 mg Oral TID  losartan (COZAAR) tablet 50 mg  50 mg Oral DAILY  oxybutynin chloride XL (DITROPAN XL) tablet 15 mg  15 mg Oral DAILY  verapamil ER (CALAN-SR) tablet 240 mg  240 mg Oral DAILY WITH BREAKFAST  methylPREDNISolone (PF) (Solu-MEDROL) injection 60 mg  60 mg IntraVENous Q12H  
 zolpidem (AMBIEN) tablet 5 mg  5 mg Oral QHS PRN  
  rosuvastatin (CRESTOR) tablet 5 mg  5 mg Oral DAILY  sodium chloride (NS) flush 5-40 mL  5-40 mL IntraVENous Q8H  
 sodium chloride (NS) flush 5-40 mL  5-40 mL IntraVENous PRN  
 enoxaparin (LOVENOX) injection 40 mg  40 mg SubCUTAneous Q24H  
 levoFLOXacin (LEVAQUIN) 500 mg in D5W IVPB  500 mg IntraVENous Q24H  
 
______________________________________________________________________ EXPECTED LENGTH OF STAY: 3d 7h 
ACTUAL LENGTH OF STAY:          3 Annette Sapp MD

## 2019-04-06 NOTE — PROGRESS NOTES
Problem: Self Care Deficits Care Plan (Adult) Goal: *Acute Goals and Plan of Care (Insert Text) Description Occupational Therapy Goals Initiated 4/6/2019 1. Patient will perform grooming in standing RA VSS  with modified independence within 7 day(s). 2.  Patient will perform upper body dressing with modified independence within 7 day(s). 3.  Patient will perform lower body dressing with AE with supervision/set-up within 7 day(s). 4.  Patient will perform toilet transfers with modified independence within 7 day(s). 5.  Patient will perform all aspects of toileting with modified independence within 7 day(s). 6.  Patient will participate in upper extremity therapeutic exercise/activities with modified independence for 5 minutes within 7 day(s). 7.  Patient will utilize energy conservation, PLB and fall prevention techniques during functional activities with verbal and visual cues within 7 day(s). Outcome: Progressing Towards Goal 
  
Problem: Patient Education: Go to Patient Education Activity Goal: Patient/Family Education Outcome: Progressing Towards Goal 
 OCCUPATIONAL THERAPY EVALUATION Patient: Cristal Peters (85 y.o. female) Date: 4/6/2019 Primary Diagnosis: Pneumonia [J18.9] Precautions:   Fall  O2 use is new ASSESSMENT : Patient admitted with pneumonia Based on the objective data described below, patient presents with Setup upper body ADLs, Maximum assistance-Dependent lower body ADLs, and Minimum assistance and Moderate Assistance assist functional mobility. BP elevated in standing 167/121 and O2 dropped to 89% on 2L; ; see vital signs sheets for VS info. The following are barriers to ADL independence while in acute care:  
- Cognitive and/or behavioral: [facility] 
- Medical condition: morbid obesity 420#; new use O2 Patient will benefit from skilled acute intervention to address the above impairments. Patient?s rehabilitation potential is considered to be Good Discharge recommendations: Home health (to increase independence and safety) If above is not an option then recommend: To be determined between the prior selections, based on patient progress during hospital stay Barriers to discharging home, in addition to above listed impairments: family availability to assist 
family availability for education/training to then follow up at home. Equipment recommendations for successful discharge (if) home: LE AE   
 
PLAN : 
Recommendations and Planned Interventions: self care training, functional mobility training, therapeutic exercise, balance training, therapeutic activities, endurance activities, patient education, home safety training and family training/education Frequency/Duration: Patient will be followed by occupational therapy 4 times a week to address goals. SUBJECTIVE:  
Patient stated ? My family helps me with dressing and bathing but I can use the bathroom myself. ? OBJECTIVE DATA SUMMARY:  
HISTORY:  
Past Medical History:  
Diagnosis Date Cholelithiasis Hypertension KEISHA (obstructive sleep apnea) Uses CPAP  
 PSVT (paroxysmal supraventricular tachycardia) ([address] Paroxysmal Supraventricular Tachycardia Psychiatric disorder Depression Unspecified adverse effect of anesthesia May 2006 Postoperative hypoxemia, treated w/ O2 & IV Diuretics Past Surgical History:  
Procedure Laterality Date [address] [facility] HX CHOLECYSTECTOMY HX CYST INCISION AND DRAINAGE Right 06/2014 HX DILATION AND CURETTAGE  May 2006 HX GYN  2008 Uterine Ablation HX TONSIL AND ADENOIDECTOMY    
 REMOVAL GALLBLADDER Prior Level of Function/Environment/Context: se up UE ADLs, D shoes and socks; I toileting; on disability Home Situation Home Environment: Private residence One/Two Story Residence: One story Living Alone: No 
Support Systems: Family member(s) Patient Expects to be Discharged to[de-identified] Private residence Current DME Used/Available at Home: Cane, straight, Shower chair, Commode, bedside Tub or Shower Type: Shower Hand dominance: Right EXAMINATION OF PERFORMANCE DEFICITS: 
Cognitive/Behavioral Status: 
Neurologic State: Alert Orientation Level: Oriented X4 Cognition: Appropriate decision making; Appropriate for age attention/concentration; Appropriate safety awareness; Follows commands Perception: Appears intact Perseveration: No perseveration noted Safety/Judgement: Fall prevention; Awareness of environment;Home safety;Decreased insight into deficits Skin: note L shin lesion Edema: morbid obesity difficult to assess adema Hearing: Auditory Auditory Impairment: None Vision/Perceptual:   
    
    
    
  
    
Acuity: [facility]) Corrective Lenses: Glasses Range of Motion: 
B UE 
AROM: Generally decreased, functional 
PROM: Within functional limits Strength: 
B UE 
Strength: Generally decreased, functional(R UE at shoulder \"drops sometimes\" from full flexion) Coordination: 
Coordination: Generally decreased, functional 
Fine Motor Skills-Upper: Left Intact; Right Intact Gross Motor Skills-Upper: Left Intact; Right Intact Tone & Sensation: 
Tone: Normal 
Sensation: Intact Balance: 
Sitting: Intact; Without support Standing: Intact; With support Functional Mobility and Transfers for ADLs: 
Bed Mobility: 
Rolling: Minimum assistance; Additional time; Adaptive equipment(bed rail used) Supine to Sit: Supervision; Additional time; Adaptive equipment Sit to Supine: Minimum assistance; Additional time;Assist x1;Adaptive equipment(bed rail; A 1 LE) Scooting: Supervision Transfers: 
Sit to Stand: Supervision Stand to Sit: Supervision Toilet Transfer : Minimum assistance(recommend HD BSC if too fatigued to walk to bathroom) ADL Assessment: Feeding: Independent(420#) Oral Facial Hygiene/Grooming: Setup Bathing: Moderate assistance(mod A PTA, due to girth) Upper Body Dressing: Setup Lower Body Dressing: Maximum assistance(min/mod A PTA; initiated training LE AE benefits) Toileting: Minimum assistance ADL Intervention and task modifications: PLB training with goal of O2 weaning; dropped to 89% on 2L in standing with /121 in standing; ; patient encouraged to use UE AROM and PLB on the quarter hour to maximize potential for effective O2 weaning; patient able to verbalize understanding of benefits of these two tools to maximize potential to participate in ADLs; Patient on disability and has assist B LE dressing and bathing but has never been trained in use of AE; interested in trying/learning use of AE Cognitive Retraining Safety/Judgement: Fall prevention; Awareness of environment;Home safety;Decreased insight into deficits Functional Measure: 
Barthel Index: 
Bathin Bladder: 10 Bowels: 10 
Groomin Dressin Feeding: 10 Mobility: 0 Stairs: 0 Toilet Use: 5 Transfer (Bed to Chair and Back): 10 Total: 50/100 Percentage of impairment  
0% 1-19% 20-39% 40-59% 60-79% 80-99% 100% Barthel Score 0-100 100 99-80 79-60 59-40 20-39 1-19 
 0 The Barthel ADL Index: Guidelines 1. The index should be used as a record of what a patient does, not as a record of what a patient could do. 2. The main aim is to establish degree of independence from any help, physical or verbal, however minor and for whatever reason. 3. The need for supervision renders the patient not independent. 4. A patient's performance should be established using the best available evidence. Asking the patient, friends/relatives and nurses are the usual sources, but direct observation and common sense are also important. However direct testing is not needed. 5. Usually the patient's performance over the preceding 24-48 hours is important, but occasionally longer periods will be relevant. 6. Middle categories imply that the patient supplies over 50 per cent of the effort. 7. Use of aids to be independent is allowed. Nonie Freshwater., Barthel, D.W. (5667). Functional evaluation: the Barthel Index. [facility] (14)2. ANDREA Leonardo, Michelle Grace.[facility], [address] [place] [address] (1999). Measuring the change indisability after inpatient rehabilitation; comparison of the responsiveness of the Barthel Index and Functional [place] Measure. Journal of Neurology, Neurosurgery, and Psychiatry, 66(4), 352-296. ARON Morris, ABIOLA Alvarado, & Trisha Marie M.A. (2004.) Assessment of post-stroke quality of life in cost-effectiveness studies: The usefulness of the Barthel Index and the EuroQoL-5D. [facility], 13, 962-66 Occupational Therapy Evaluation Charge Determination History Examination Decision-Making LOW Complexity : Brief history review  MEDIUM Complexity : 3-5 performance deficits relating to physical, cognitive , or psychosocial skils that result in activity limitations and / or participation restrictions LOW Complexity : No comorbidities that affect functional and no verbal or physical assistance needed to complete eval tasks Based on the above components, the patient evaluation is determined to be of the following complexity level: LOW Pain: 
Pre treatment: 2/10 During treatment: 2/10 Post treatment:  2/10 Location: lower ribs with coughing gets it worse per patient Description:sharp Aggravating factors: cough Activity Tolerance:  
Fair and Poor Please refer to the flowsheet for vital signs taken during this treatment. After treatment patient left:  
Supine in bed Bed/Chair-wheels locked Bed in low position Family at bedside Side rails x 3  
COMMUNICATION/EDUCATION:  
 The patient?s plan of care was discussed with: Registered Nurse. Home safety education was provided and the patient/caregiver indicated understanding., Patient/family have participated as able in goal setting and plan of care. and Patient/family agree to work toward stated goals and plan of care. This patient?s plan of care is appropriate for delegation to ANDREW. Thank you for this referral. 
Caren Epstein OTR/L Time Calculation: 45 mins

## 2019-04-07 PROCEDURE — 94760 N-INVAS EAR/PLS OXIMETRY 1: CPT

## 2019-04-07 PROCEDURE — 77010033678 HC OXYGEN DAILY

## 2019-04-07 PROCEDURE — 74011250636 HC RX REV CODE- 250/636: Performed by: NEUROMUSCULOSKELETAL MEDICINE & OMM

## 2019-04-07 PROCEDURE — 65660000000 HC RM CCU STEPDOWN

## 2019-04-07 PROCEDURE — 74011250636 HC RX REV CODE- 250/636: Performed by: HOSPITALIST

## 2019-04-07 PROCEDURE — 74011250637 HC RX REV CODE- 250/637: Performed by: HOSPITALIST

## 2019-04-07 PROCEDURE — 74011250637 HC RX REV CODE- 250/637: Performed by: INTERNAL MEDICINE

## 2019-04-07 RX ORDER — PREDNISONE 20 MG/1
60 TABLET ORAL
Status: DISCONTINUED | OUTPATIENT
Start: 2019-04-08 | End: 2019-04-09 | Stop reason: HOSPADM

## 2019-04-07 RX ORDER — ACETAMINOPHEN 325 MG/1
650 TABLET ORAL
Status: DISCONTINUED | OUTPATIENT
Start: 2019-04-07 | End: 2019-04-09 | Stop reason: HOSPADM

## 2019-04-07 RX ADMIN — DULOXETINE HYDROCHLORIDE 120 MG: 60 CAPSULE, DELAYED RELEASE ORAL at 08:11

## 2019-04-07 RX ADMIN — VITAMIN A AND D: 30.8 OINTMENT TOPICAL at 08:12

## 2019-04-07 RX ADMIN — FLECAINIDE ACETATE 100 MG: 100 TABLET ORAL at 08:11

## 2019-04-07 RX ADMIN — FUROSEMIDE 20 MG: 20 TABLET ORAL at 08:11

## 2019-04-07 RX ADMIN — GABAPENTIN 400 MG: 400 CAPSULE ORAL at 08:11

## 2019-04-07 RX ADMIN — OXYBUTYNIN CHLORIDE 15 MG: 5 TABLET, EXTENDED RELEASE ORAL at 08:11

## 2019-04-07 RX ADMIN — BUPROPION HYDROCHLORIDE 150 MG: 150 TABLET, EXTENDED RELEASE ORAL at 21:35

## 2019-04-07 RX ADMIN — Medication 10 ML: at 22:43

## 2019-04-07 RX ADMIN — GABAPENTIN 400 MG: 400 CAPSULE ORAL at 16:59

## 2019-04-07 RX ADMIN — LEVOFLOXACIN 500 MG: 5 INJECTION, SOLUTION INTRAVENOUS at 21:45

## 2019-04-07 RX ADMIN — METHYLPREDNISOLONE SODIUM SUCCINATE 60 MG: 125 INJECTION, POWDER, FOR SOLUTION INTRAMUSCULAR; INTRAVENOUS at 08:11

## 2019-04-07 RX ADMIN — BENZONATATE 100 MG: 100 CAPSULE ORAL at 14:40

## 2019-04-07 RX ADMIN — BUPROPION HYDROCHLORIDE 150 MG: 150 TABLET, EXTENDED RELEASE ORAL at 08:11

## 2019-04-07 RX ADMIN — ENOXAPARIN SODIUM 40 MG: 100 INJECTION SUBCUTANEOUS at 22:34

## 2019-04-07 RX ADMIN — VERAPAMIL HYDROCHLORIDE 240 MG: 240 TABLET, FILM COATED, EXTENDED RELEASE ORAL at 08:11

## 2019-04-07 RX ADMIN — ACETAMINOPHEN 650 MG: 325 TABLET ORAL at 22:35

## 2019-04-07 RX ADMIN — GABAPENTIN 400 MG: 400 CAPSULE ORAL at 22:00

## 2019-04-07 RX ADMIN — BENZONATATE 100 MG: 100 CAPSULE ORAL at 22:35

## 2019-04-07 RX ADMIN — ROSUVASTATIN CALCIUM 5 MG: 10 TABLET, COATED ORAL at 08:10

## 2019-04-07 RX ADMIN — LOSARTAN POTASSIUM 50 MG: 50 TABLET ORAL at 08:11

## 2019-04-07 NOTE — PROGRESS NOTES
Bedside and Verbal shift change report given to Chelsea Godinez RN (oncoming nurse) by Francesca Larson RN (offgoing nurse). Report included the following information SBAR, Kardex, Intake/Output, MAR and Recent Results.

## 2019-04-07 NOTE — PROGRESS NOTES
TELEMETRY MONITOR TECH CALL TO SAY PATIENT IS IN  A FLUTTER, LEADS CHANGED AND READJUSTED AS PATIENT  WAS SWEATING, SO THE LEADS FELT WET. VITALS CHECKED ALL WDL; NO C/O FROM PATIENT. MONITOR TECH CALLED AGAIN FOR HEART RATE READING BUT SHE SAID PT IS BACK TO SINUS RHYTHM AND DON'T THINK IT WAS A TRUE READING EARLIER.

## 2019-04-07 NOTE — PROGRESS NOTES
Hospitalist Progress Note Pia Orosco MD 
Answering service: [phone] OR [phone] from in house phone Date of Service:  2019 NAME:  Darrick Del Castillo :  1960 MRN:  [id number] Admission Summary:  
Ailin Johnson is a 59 y.o.  female who presents with dyspnea over the last 4 days with tayler colored sputum. The pt has a hisotry of keisha but is noncompliant with cpap. The pt was evaluated at pt first 2 days and was dx with pneumonia and cellulitis and placed on doxycycline. She reports fever of 100.9. She denies any nausea or vomiting. She states she doesn't use oxygen at home. Interval history / Subjective: She gets SOB very quickly and needed o2 She will need rehab Assessment & Plan: 1. Pneumonia by CTA   
- place on iv levaquin. Blood culture NGTD 
- Place on duonebs and supplemental oxygen. Add methylpred 60mg daily will switch to oral today 
- completing Levaquin  
  
2. KEISHA - not using cpap at home cont supplemental oxygen. - need ourt pt sleep study  
  
3. LE  cellulitis - on Levaquin with d/c vanc no fever or high WBC count  
  
4. Body mass index is 62.02 kg/m². morbid obesity, choletlithiasis and depression. Resume all home meds  
  
5. Hx of hypothyroidism - TSH WNL 6. She is on anti arrhythmics Tambocor and CCB  - cont for now will f/u with her cardiologist no acute issues 
  
Code Status: Full Surrogate Decision Maker: Talia Jordan [id number] DVT prophylaxis: Lovenox Care Plan discussed with: Patient/Family and Nurse Disposition: TBD rehab monday Hospital Problems  Date Reviewed: 12/15/2017 Codes Class Noted POA Pneumonia ICD-10-CM: J18.9 ICD-9-CM: 404  4/3/2019 Unknown Review of Systems: A comprehensive review of systems was negative. Cta Chest W Or W Wo Cont Result Date: 4/3/2019 IMPRESSION: There is no proximal pulmonary embolism. There is no aortic aneurysm or dissection. Image quality is degraded by phase of contrast and patient body habitus. Atelectasis/consolidation in the right lower and right upper lobes with associated groundglass opacity. Imaging findings are most consistent with a multifocal pneumonia. Infectious and/or inflammatory in etiology. Vital Signs:  
 Last 24hrs VS reviewed since prior progress note. Most recent are: 
Visit Vitals /83 (BP 1 Location: Left arm, BP Patient Position: At rest) Pulse 82 Temp 98 °F (36.7 °C) Resp 18 Ht 5' 9\" (1.753 m) Wt (!) 190.5 kg (420 lb) SpO2 97% BMI 62.02 kg/m² Intake/Output Summary (Last 24 hours) at 4/7/2019 8206 Last data filed at 4/6/2019 1300 Gross per 24 hour Intake 340 ml Output  Net 340 ml Physical Examination:  
 
 
     
Constitutional:  No acute distress, cooperative, obese ENT:  Oral mucous moist, oropharynx benign. Neck supple, Resp:  CTA bilaterally. No wheezing/rhonchi/rales. CV:  Regular rhythm, normal rate, no murmurs, gallops, rubs GI:  Soft, non distended, non tender. bs+ Musculoskeletal:  No edema, warm, 2+ pulses throughout Neurologic:  Moves all extremities. AAOx3, CN II-XII reviewed Psych:  Good insight, Not anxious nor agitated. Data Review:  
 I personally reviewed  Image and LABS Labs:  
 
No results for input(s): WBC, HGB, HCT, PLT, HGBEXT, HCTEXT, PLTEXT, HGBEXT, HCTEXT, PLTEXT in the last 72 hours. Recent Labs 04/06/19 
8395 04/05/19 
0445  140  
K 3.7 4.1  106 CO2 26 28 BUN 27* 20  
CREA 1.31* 1.11* * 185* CA 8.7 8.7 No results for input(s): SGOT, GPT, ALT, AP, TBIL, TBILI, TP, ALB, GLOB, GGT, AML, LPSE in the last 72 hours. No lab exists for component: AMYP, HLPSE No results for input(s): INR, PTP, APTT in the last 72 hours.  
 
No lab exists for component: INREXT, INREXT  
 No results for input(s): FE, TIBC, PSAT, FERR in the last 72 hours. No results found for: FOL, RBCF No results for input(s): PH, PCO2, PO2 in the last 72 hours. No results for input(s): CPK, CKNDX, TROIQ in the last 72 hours. No lab exists for component: CPKMB Lab Results Component Value Date/Time Cholesterol, total 159 12/15/2017 11:00 AM  
 HDL Cholesterol 52 12/15/2017 11:00 AM  
 LDL, calculated 91 12/15/2017 11:00 AM  
 Triglyceride 80 12/15/2017 11:00 AM  
 
Lab Results Component Value Date/Time Glucose (POC) 110 (H) 08/04/2010 09:57 AM  
 Glucose (POC) 83 01/27/2009 01:22 PM  
 
Lab Results Component Value Date/Time Color Yellow 04/29/2015 10:46 AM  
 Appearance Cloudy (A) 04/29/2015 10:46 AM  
 pH (UA) 6.0 04/29/2015 10:46 AM  
 Ketone Negative 04/29/2015 10:46 AM  
 Bilirubin Negative 04/29/2015 10:46 AM  
 Nitrites Negative 04/29/2015 10:46 AM  
 Leukocyte Esterase Negative 04/29/2015 10:46 AM  
 Bacteria None seen 04/29/2015 10:46 AM  
 WBC 0-5 04/29/2015 10:46 AM  
 RBC 0-2 04/29/2015 10:46 AM  
 
 
 
Medications Reviewed:  
 
Current Facility-Administered Medications Medication Dose Route Frequency  albuterol-ipratropium (DUO-NEB) 2.5 MG-0.5 MG/3 ML  3 mL Nebulization Q4H PRN  
 vits A and D-white pet-lanolin (A&D) ointment   Topical DAILY  benzonatate (TESSALON) capsule 100 mg  100 mg Oral TID PRN  
 buPROPion SR (WELLBUTRIN SR) tablet 150 mg  150 mg Oral Q12H  
 DULoxetine (CYMBALTA) capsule 120 mg  120 mg Oral DAILY  flecainide (TAMBOCOR) tablet 100 mg  100 mg Oral DAILY  furosemide (LASIX) tablet 20 mg  20 mg Oral DAILY  gabapentin (NEURONTIN) capsule 400 mg  400 mg Oral TID  losartan (COZAAR) tablet 50 mg  50 mg Oral DAILY  oxybutynin chloride XL (DITROPAN XL) tablet 15 mg  15 mg Oral DAILY  verapamil ER (CALAN-SR) tablet 240 mg  240 mg Oral DAILY WITH BREAKFAST  methylPREDNISolone (PF) (Solu-MEDROL) injection 60 mg  60 mg IntraVENous Q12H  zolpidem (AMBIEN) tablet 5 mg  5 mg Oral QHS PRN  
 rosuvastatin (CRESTOR) tablet 5 mg  5 mg Oral DAILY  sodium chloride (NS) flush 5-40 mL  5-40 mL IntraVENous Q8H  
 sodium chloride (NS) flush 5-40 mL  5-40 mL IntraVENous PRN  
 enoxaparin (LOVENOX) injection 40 mg  40 mg SubCUTAneous Q24H  
 levoFLOXacin (LEVAQUIN) 500 mg in D5W IVPB  500 mg IntraVENous Q24H  
 
______________________________________________________________________ EXPECTED LENGTH OF STAY: 3d 7h 
ACTUAL LENGTH OF STAY:          4 Mikal Martinez MD

## 2019-04-07 NOTE — PROGRESS NOTES
Problem: Falls - Risk of 
Goal: *Absence of Falls Description Document Keo Chan Fall Risk and appropriate interventions in the flowsheet. 4/7/2019 0941 by Loy Sirnivasan Outcome: Progressing Towards Goal 
4/7/2019 0939 by Loy Srinivasan Outcome: Progressing Towards Goal 
  
Problem: Patient Education: Go to Patient Education Activity Goal: Patient/Family Education 4/7/2019 0941 by Loy Madisonri Outcome: Progressing Towards Goal 
4/7/2019 0939 by Loy Madisonri Outcome: Progressing Towards Goal 
  
Problem: Patient Education: Go to Patient Education Activity Goal: Patient/Family Education 4/7/2019 0941 by Loy Srinivasan Outcome: Progressing Towards Goal 
4/7/2019 0939 by Loy Craig Outcome: Progressing Towards Goal 
  
Problem: Patient Education: Go to Patient Education Activity Goal: Patient/Family Education 4/7/2019 0941 by Loy Srinivasan Outcome: Progressing Towards Goal 
4/7/2019 0939 by Loy Craig Outcome: Progressing Towards Goal 
  
Problem: Deep Venous Thrombosis - Risk of 
Goal: *Absence of deep venous thrombosis signs and symptoms(Stroke Metric) 4/7/2019 0941 by Loy Srinivasan Outcome: Progressing Towards Goal 
4/7/2019 0939 by Loy Craig Outcome: Progressing Towards Goal 
Goal: *Absence of impaired coagulation signs and symptoms 4/7/2019 0941 by Loy Srinivasan Outcome: Progressing Towards Goal 
4/7/2019 0939 by Loy Craig Outcome: Progressing Towards Goal 
Goal: *Knowledge of prescribed medications 4/7/2019 0941 by Loy Srinivasan Outcome: Progressing Towards Goal 
4/7/2019 0939 by Loy Srinivasan Outcome: Progressing Towards Goal 
Goal: *Absence of bleeding 4/7/2019 0941 by Loy Srinivasan Outcome: Progressing Towards Goal 
4/7/2019 0939 by Loy Craig Outcome: Progressing Towards Goal

## 2019-04-07 NOTE — PROGRESS NOTES
Bedside and Verbal shift change report given to Tanvir Garcia RN (oncoming nurse) by Heather Brandt RN (offgoing nurse). Report included the following information SBAR, Kardex, Intake/Output, MAR and Recent Results.

## 2019-04-08 LAB
BACTERIA SPEC CULT: NORMAL
SERVICE CMNT-IMP: NORMAL

## 2019-04-08 PROCEDURE — 74011250636 HC RX REV CODE- 250/636: Performed by: NEUROMUSCULOSKELETAL MEDICINE & OMM

## 2019-04-08 PROCEDURE — 65270000029 HC RM PRIVATE

## 2019-04-08 PROCEDURE — 94760 N-INVAS EAR/PLS OXIMETRY 1: CPT

## 2019-04-08 PROCEDURE — 97116 GAIT TRAINING THERAPY: CPT

## 2019-04-08 PROCEDURE — 97535 SELF CARE MNGMENT TRAINING: CPT

## 2019-04-08 PROCEDURE — 74011636637 HC RX REV CODE- 636/637: Performed by: HOSPITALIST

## 2019-04-08 PROCEDURE — 74011250637 HC RX REV CODE- 250/637: Performed by: HOSPITALIST

## 2019-04-08 RX ADMIN — DULOXETINE HYDROCHLORIDE 120 MG: 60 CAPSULE, DELAYED RELEASE ORAL at 09:24

## 2019-04-08 RX ADMIN — Medication 10 ML: at 22:00

## 2019-04-08 RX ADMIN — GABAPENTIN 400 MG: 400 CAPSULE ORAL at 09:24

## 2019-04-08 RX ADMIN — PREDNISONE 60 MG: 20 TABLET ORAL at 08:37

## 2019-04-08 RX ADMIN — Medication 10 ML: at 08:38

## 2019-04-08 RX ADMIN — VITAMIN A AND D: 30.8 OINTMENT TOPICAL at 09:22

## 2019-04-08 RX ADMIN — ENOXAPARIN SODIUM 40 MG: 100 INJECTION SUBCUTANEOUS at 21:35

## 2019-04-08 RX ADMIN — VERAPAMIL HYDROCHLORIDE 240 MG: 240 TABLET, FILM COATED, EXTENDED RELEASE ORAL at 10:51

## 2019-04-08 RX ADMIN — BENZONATATE 100 MG: 100 CAPSULE ORAL at 08:37

## 2019-04-08 RX ADMIN — LOSARTAN POTASSIUM 50 MG: 50 TABLET ORAL at 09:24

## 2019-04-08 RX ADMIN — ROSUVASTATIN CALCIUM 5 MG: 10 TABLET, COATED ORAL at 09:24

## 2019-04-08 RX ADMIN — Medication 10 ML: at 14:00

## 2019-04-08 RX ADMIN — BUPROPION HYDROCHLORIDE 150 MG: 150 TABLET, EXTENDED RELEASE ORAL at 21:34

## 2019-04-08 RX ADMIN — OXYBUTYNIN CHLORIDE 15 MG: 5 TABLET, EXTENDED RELEASE ORAL at 09:24

## 2019-04-08 RX ADMIN — GABAPENTIN 400 MG: 400 CAPSULE ORAL at 16:56

## 2019-04-08 RX ADMIN — FUROSEMIDE 20 MG: 20 TABLET ORAL at 09:24

## 2019-04-08 RX ADMIN — GABAPENTIN 400 MG: 400 CAPSULE ORAL at 21:34

## 2019-04-08 RX ADMIN — FLECAINIDE ACETATE 100 MG: 100 TABLET ORAL at 09:25

## 2019-04-08 RX ADMIN — BUPROPION HYDROCHLORIDE 150 MG: 150 TABLET, EXTENDED RELEASE ORAL at 09:24

## 2019-04-08 NOTE — PROGRESS NOTES
Problem: Mobility Impaired (Adult and Pediatric) Goal: *Acute Goals and Plan of Care (Insert Text) Description Physical Therapy Goals Initiated 4/5/2019 1. Patient will move from supine to sit and sit to supine  in bed with modified independence within 7 day(s). 2.  Patient will transfer from bed to chair and chair to bed with modified independence using the least restrictive device within 7 day(s). 3.  Patient will perform sit to stand with modified independence within 7 day(s). 4.  Patient will ambulate with modified independence for 100 feet with the least restrictive device and O2 >90-95% with or without O2 within 7 day(s). Outcome: Progressing Towards Goal 
 PHYSICAL THERAPY TREATMENT Patient: Philomena Watson (89 y.o. female) Date: 4/8/2019 Diagnosis: Pneumonia [J18.9] <principal problem not specified> Precautions: Fall Chart, physical therapy assessment, plan of care and goals were reviewed. ASSESSMENT: 
Patient taught pursed lip breathing to increase spO2 during rest breaks and educated on rationale of same. Performed well. Prior to walk: spO2 97%/HR 85; walked 50 ft with cane, gait belt and contact guard assistance. Became visibly SOB & asked for rest break, leaned against wall: spO2 87%/; PLB recovered to 95%/HR 98; Ambulated back to room without rest break and spO2 96%/HR 97. Entire session performed on 2L supplemental O2, nasal cannula. Improving toward goals and RPE is consistent with vital signs. Upon discharge, patient will benefit from In Patient Rehab, in order to achieve maximum level of safe, functional mobility, balance and return to independent PLOF. Progression toward goals: 
?    Improving appropriately and progressing toward goals ? Improving slowly and progressing toward goals ? Not making progress toward goals and plan of care will be adjusted PLAN: 
Patient continues to benefit from skilled intervention to address the above impairments. Continue treatment per established plan of care. Discharge Recommendations:  Inpatient Rehab Further Equipment Recommendations for Discharge: To Be Determined SUBJECTIVE:  
Patient stated ? I am doing better. ? OBJECTIVE DATA SUMMARY:  
Critical Behavior: 
Neurologic State: Alert Orientation Level: Oriented X4 Cognition: Appropriate decision making Safety/Judgement: Fall prevention, Awareness of environment, Home safety, Decreased insight into deficits Functional Mobility Training: 
Bed Mobility: 
Rolling: Minimum assistance; Adaptive equipment; Other (comment)(Bed Rail) Supine to Sit: Supervision; Additional time Sit to Supine: Other (comment)(Patient finished up in chair) Scooting: Supervision Transfers: 
Sit to Stand: Supervision Stand to Sit: Supervision Balance: 
Sitting: Intact; Without support Standing: Intact; With support Ambulation/Gait Training: 
Distance (ft): 110 Feet (ft) Assistive Device: Cane, straight;Other (comment)(supplemental O2-portable tank) Ambulation - Level of Assistance: Contact guard assistance Gait Abnormalities: Circumduction; Altered arm swing;Decreased step clearance;Shuffling gait;Trunk sway increased;Trendelenburg Base of Support: Widened Stance: Right decreased Speed/Lana: Slow Step Length: Right shortened;Left shortened Activity Tolerance:  
Fair-improving-see details in above narrative After treatment:  
?    Patient left in no apparent distress sitting up in chair ? Patient left in no apparent distress in bed 
? Call bell left within reach ? Nursing notified ? Caregiver present ? Bed alarm activated COMMUNICATION/COLLABORATION:  
The patient?s plan of care was discussed with: Registered Nurse Marimar Wilkerson Time Calculation: 30 mins

## 2019-04-08 NOTE — PROGRESS NOTES
Hospitalist Progress Note Conor Nixon MD 
Answering service: [phone] OR [phone] from in house phone Date of Service:  2019 NAME:  Leilani Clay :  1960 MRN:  [id number] Admission Summary:  
Erica Puga is a 59 y.o.  female who presents with dyspnea over the last 4 days with tayler colored sputum. The pt has a hisotry of keisha but is noncompliant with cpap. The pt was evaluated at pt first 2 days and was dx with pneumonia and cellulitis and placed on doxycycline. She reports fever of 100.9. She denies any nausea or vomiting. She states she doesn't use oxygen at home. Interval history / Subjective: She gets SOB very quickly and needed o2 She will need rehab d/w CM She will eventually need out pt sleep study and get a CPAP Assessment & Plan: 1. Pneumonia by CTA   
- place on iv levaquin. Blood culture NGTD  
- Place on duonebs and supplemental oxygen. - steroid to oral 
- completing Levaquin  
  
2. KEISHA - not using cpap at home cont supplemental oxygen. - need ourt pt sleep study  
- wean off O2 as tolerates 
  
3. LE  cellulitis - on Levaquin with d/c vanc no fever or high WBC count  
  
4. Body mass index is 62.02 kg/m². morbid obesity, choletlithiasis and depression. Resume all home meds  
  
5. Hx of hypothyroidism - TSH WNL 6. She is on anti arrhythmics Tambocor and CCB  - cont for now will f/u with her cardiologist no acute issues 
  
Code Status: Full Surrogate Decision Maker: Adwoa Barros [id number] DVT prophylaxis: Lovenox Care Plan discussed with: Patient/Family and Nurse Disposition: TBD rehab soon ready for d/c Hospital Problems  Date Reviewed: 12/15/2017 Codes Class Noted POA Pneumonia ICD-10-CM: J18.9 ICD-9-CM: 690  4/3/2019 Unknown Review of Systems: A comprehensive review of systems was negative. Cta Chest W Or W Wo Cont Result Date: 4/3/2019 IMPRESSION: There is no proximal pulmonary embolism. There is no aortic aneurysm or dissection. Image quality is degraded by phase of contrast and patient body habitus. Atelectasis/consolidation in the right lower and right upper lobes with associated groundglass opacity. Imaging findings are most consistent with a multifocal pneumonia. Infectious and/or inflammatory in etiology. Vital Signs:  
 Last 24hrs VS reviewed since prior progress note. Most recent are: 
Visit Vitals /72 Pulse 74 Temp 98 °F (36.7 °C) Resp 16 Ht 5' 9\" (1.753 m) Wt (!) 190.5 kg (420 lb) SpO2 99% BMI 62.02 kg/m² Intake/Output Summary (Last 24 hours) at 4/8/2019 1002 Last data filed at 4/8/2019 3965 Gross per 24 hour Intake 240 ml Output  Net 240 ml Physical Examination:  
 
 
     
Constitutional:  No acute distress, cooperative, obese ENT:  Oral mucous moist, oropharynx benign. Neck supple, Resp:  CTA bilaterally. No wheezing/rhonchi/rales. CV:  Regular rhythm, normal rate, no murmurs, gallops, rubs GI:  Soft, non distended, non tender. bs+ Musculoskeletal:  No edema, warm, 2+ pulses throughout Neurologic:  Moves all extremities. AAOx3, CN II-XII reviewed Psych:  Good insight, Not anxious nor agitated. Data Review:  
 I personally reviewed  Image and LABS Labs:  
 
No results for input(s): WBC, HGB, HCT, PLT, HGBEXT, HCTEXT, PLTEXT, HGBEXT, HCTEXT, PLTEXT in the last 72 hours. Recent Labs 04/06/19 
9869   
K 3.7  CO2 26 BUN 27* CREA 1.31* * CA 8.7 No results for input(s): SGOT, GPT, ALT, AP, TBIL, TBILI, TP, ALB, GLOB, GGT, AML, LPSE in the last 72 hours. No lab exists for component: AMYP, HLPSE No results for input(s): INR, PTP, APTT in the last 72 hours.  
 
No lab exists for component: INREXT, INREXT  
 No results for input(s): FE, TIBC, PSAT, FERR in the last 72 hours. No results found for: FOL, RBCF No results for input(s): PH, PCO2, PO2 in the last 72 hours. No results for input(s): CPK, CKNDX, TROIQ in the last 72 hours. No lab exists for component: CPKMB Lab Results Component Value Date/Time Cholesterol, total 159 12/15/2017 11:00 AM  
 HDL Cholesterol 52 12/15/2017 11:00 AM  
 LDL, calculated 91 12/15/2017 11:00 AM  
 Triglyceride 80 12/15/2017 11:00 AM  
 
Lab Results Component Value Date/Time Glucose (POC) 110 (H) 08/04/2010 09:57 AM  
 Glucose (POC) 83 01/27/2009 01:22 PM  
 
Lab Results Component Value Date/Time Color Yellow 04/29/2015 10:46 AM  
 Appearance Cloudy (A) 04/29/2015 10:46 AM  
 pH (UA) 6.0 04/29/2015 10:46 AM  
 Ketone Negative 04/29/2015 10:46 AM  
 Bilirubin Negative 04/29/2015 10:46 AM  
 Nitrites Negative 04/29/2015 10:46 AM  
 Leukocyte Esterase Negative 04/29/2015 10:46 AM  
 Bacteria None seen 04/29/2015 10:46 AM  
 WBC 0-5 04/29/2015 10:46 AM  
 RBC 0-2 04/29/2015 10:46 AM  
 
 
 
Medications Reviewed:  
 
Current Facility-Administered Medications Medication Dose Route Frequency  predniSONE (DELTASONE) tablet 60 mg  60 mg Oral DAILY WITH BREAKFAST  acetaminophen (TYLENOL) tablet 650 mg  650 mg Oral Q6H PRN  
 albuterol-ipratropium (DUO-NEB) 2.5 MG-0.5 MG/3 ML  3 mL Nebulization Q4H PRN  
 vits A and D-white pet-lanolin (A&D) ointment   Topical DAILY  benzonatate (TESSALON) capsule 100 mg  100 mg Oral TID PRN  
 buPROPion SR (WELLBUTRIN SR) tablet 150 mg  150 mg Oral Q12H  
 DULoxetine (CYMBALTA) capsule 120 mg  120 mg Oral DAILY  flecainide (TAMBOCOR) tablet 100 mg  100 mg Oral DAILY  furosemide (LASIX) tablet 20 mg  20 mg Oral DAILY  gabapentin (NEURONTIN) capsule 400 mg  400 mg Oral TID  losartan (COZAAR) tablet 50 mg  50 mg Oral DAILY  oxybutynin chloride XL (DITROPAN XL) tablet 15 mg  15 mg Oral DAILY  verapamil ER (CALAN-SR) tablet 240 mg  240 mg Oral DAILY WITH BREAKFAST  zolpidem (AMBIEN) tablet 5 mg  5 mg Oral QHS PRN  
 rosuvastatin (CRESTOR) tablet 5 mg  5 mg Oral DAILY  sodium chloride (NS) flush 5-40 mL  5-40 mL IntraVENous Q8H  
 sodium chloride (NS) flush 5-40 mL  5-40 mL IntraVENous PRN  
 enoxaparin (LOVENOX) injection 40 mg  40 mg SubCUTAneous Q24H  
 
______________________________________________________________________ EXPECTED LENGTH OF STAY: 3d 7h 
ACTUAL LENGTH OF STAY:          5 Ric Buitrago MD

## 2019-04-08 NOTE — PROGRESS NOTES
Problem: Self Care Deficits Care Plan (Adult) Goal: *Acute Goals and Plan of Care (Insert Text) Description Occupational Therapy Goals Initiated 4/6/2019 1. Patient will perform grooming in standing RA VSS  with modified independence within 7 day(s). 2.  Patient will perform upper body dressing with modified independence within 7 day(s). 3.  Patient will perform lower body dressing with AE with supervision/set-up within 7 day(s). 4.  Patient will perform toilet transfers with modified independence within 7 day(s). 5.  Patient will perform all aspects of toileting with modified independence within 7 day(s). 6.  Patient will participate in upper extremity therapeutic exercise/activities with modified independence for 5 minutes within 7 day(s). 7.  Patient will utilize energy conservation, PLB and fall prevention techniques during functional activities with verbal and visual cues within 7 day(s). Outcome: Progressing Towards Goal 
  
OCCUPATIONAL THERAPY TREATMENT Patient: Dina Whyte (85 y.o. female) Date: 4/8/2019 Diagnosis: Pneumonia [J18.9] <principal problem not specified> Precautions: Fall Chart, occupational therapy assessment, plan of care, and goals were reviewed. ASSESSMENT: 
Cleared by RN to see pt for therapy session. Pt received supine in bed, agreeable to participate, on 2 L O2. Supervision for supine>sit, good sitting balance. Stood from EOB to cane with standby assist and ambulated to bathroom with CGA and increased time. Seated toilet hygiene performed with supervision. Performed brief standing grooming ADLs at sink, fatigued quickly, then returned to sitting EOB. Practiced PLB technique and energy conservation techniques and pt demonstrated understanding. Returned to bed at end of session with min A. Pt's vitals monitored and stable during session (see below), although pt VALLEJO with all activity.  Pt is below her functional baseline at this time due to decreased endurance and strength and need for supplemental O2. Recommend rehab at discharge to increase independence and safety. Preactivity: 97% HR 77 After ambulating to bathroom,: 94% HR 96 After standing grooming ADL: 90%  (sats 97% and HR in 80s quickly with PLB) Progression toward goals: 
?       Improving appropriately and progressing toward goals ? Improving slowly and progressing toward goals ? Not making progress toward goals and plan of care will be adjusted PLAN: 
Patient continues to benefit from skilled intervention to address the above impairments. Continue treatment per established plan of care. Discharge Recommendations:  Rehab Further Equipment Recommendations for Discharge:  TBD SUBJECTIVE:  
Patient stated I'm a little tired.  OBJECTIVE DATA SUMMARY:  
Cognitive/Behavioral Status: 
Neurologic State: Alert Orientation Level: Oriented X4 Cognition: Follows commands Perception: Appears intact Perseveration: No perseveration noted Safety/Judgement: Awareness of environment; Fall prevention Functional Mobility and Transfers for ADLs: 
Bed Mobility: 
Rolling: Minimum assistance; Adaptive equipment; Other (comment)(Bed Rail) Supine to Sit: Supervision; Additional time Sit to Supine: Minimum assistance Scooting: Supervision Transfers: 
Sit to Stand: Additional time; Adaptive equipment;Contact guard assistance(cane) Functional Transfers Toilet Transfer : Contact guard assistance; Adaptive equipment; Additional time(cane, grabbar) Balance: 
Sitting: Intact Standing: Intact; With support(cane) ADL Intervention: 
 Instructed on PLB technique and energy conservation technique of taking purposeful rest breaks during ADLs to prevent fatigue. Grooming Washing Face: Contact guard assistance(standing at sink) Washing Hands: Contact guard assistance(standing at sink) Toileting Bowel Hygiene: Supervision/set-up(seated) Cognitive Retraining Safety/Judgement: Awareness of environment; Fall prevention Pain: 
Pain Scale 1: Numeric (0 - 10) Pain Intensity 1: 0 Activity Tolerance:  
Good Please refer to the flowsheet for vital signs taken during this treatment. After treatment:  
? Patient left in no apparent distress sitting up in chair ? Patient left in no apparent distress in bed 
? Call bell left within reach ? Nursing notified ? Caregiver present ? Bed alarm activated COMMUNICATION/COLLABORATION:  
The patients plan of care was discussed with: Physical Therapist and Registered Nurse Tino Dhillon OT Time Calculation: 31 mins

## 2019-04-08 NOTE — PROGRESS NOTES
Chart reviewed. Noted patient needs rehab placement. CM met with patient and  at bedside and offered freedom of choice for rehab. Preference is [facility]. CM sent referral to [facility] via [employer]. Care management will continue to follow. 3:10 PM: Christopher Center with [facility] is here to evaluate the patient. RUSS Banks/CRM

## 2019-04-09 VITALS
BODY MASS INDEX: 43.4 KG/M2 | HEART RATE: 77 BPM | OXYGEN SATURATION: 97 % | TEMPERATURE: 97.3 F | RESPIRATION RATE: 16 BRPM | DIASTOLIC BLOOD PRESSURE: 76 MMHG | HEIGHT: 69 IN | WEIGHT: 293 LBS | SYSTOLIC BLOOD PRESSURE: 148 MMHG

## 2019-04-09 PROCEDURE — 74011250637 HC RX REV CODE- 250/637: Performed by: HOSPITALIST

## 2019-04-09 PROCEDURE — 94760 N-INVAS EAR/PLS OXIMETRY 1: CPT

## 2019-04-09 PROCEDURE — 94762 N-INVAS EAR/PLS OXIMTRY CONT: CPT

## 2019-04-09 PROCEDURE — 74011250637 HC RX REV CODE- 250/637: Performed by: INTERNAL MEDICINE

## 2019-04-09 PROCEDURE — 77010033678 HC OXYGEN DAILY

## 2019-04-09 PROCEDURE — 74011636637 HC RX REV CODE- 636/637: Performed by: HOSPITALIST

## 2019-04-09 RX ORDER — PREDNISONE 20 MG/1
40 TABLET ORAL
Qty: 10 TAB | Refills: 0 | Status: SHIPPED
Start: 2019-04-09 | End: 2019-04-14

## 2019-04-09 RX ADMIN — BUPROPION HYDROCHLORIDE 150 MG: 150 TABLET, EXTENDED RELEASE ORAL at 09:05

## 2019-04-09 RX ADMIN — LOSARTAN POTASSIUM 50 MG: 50 TABLET ORAL at 09:06

## 2019-04-09 RX ADMIN — GABAPENTIN 400 MG: 400 CAPSULE ORAL at 09:06

## 2019-04-09 RX ADMIN — DULOXETINE HYDROCHLORIDE 120 MG: 60 CAPSULE, DELAYED RELEASE ORAL at 09:05

## 2019-04-09 RX ADMIN — OXYBUTYNIN CHLORIDE 15 MG: 5 TABLET, EXTENDED RELEASE ORAL at 09:05

## 2019-04-09 RX ADMIN — FUROSEMIDE 20 MG: 20 TABLET ORAL at 09:06

## 2019-04-09 RX ADMIN — ROSUVASTATIN CALCIUM 5 MG: 10 TABLET, COATED ORAL at 09:05

## 2019-04-09 RX ADMIN — PREDNISONE 60 MG: 20 TABLET ORAL at 08:30

## 2019-04-09 RX ADMIN — VERAPAMIL HYDROCHLORIDE 240 MG: 240 TABLET, FILM COATED, EXTENDED RELEASE ORAL at 07:46

## 2019-04-09 RX ADMIN — FLECAINIDE ACETATE 100 MG: 100 TABLET ORAL at 09:06

## 2019-04-09 RX ADMIN — Medication 10 ML: at 06:00

## 2019-04-09 RX ADMIN — ACETAMINOPHEN 650 MG: 325 TABLET ORAL at 09:05

## 2019-04-09 RX ADMIN — VITAMIN A AND D: 30.8 OINTMENT TOPICAL at 09:10

## 2019-04-09 NOTE — DISCHARGE INSTRUCTIONS
Patient Discharge Instructions    Leilani Clay / [id number] : 1960    Admitted 4/3/2019 Discharged: 2019 12:44 PM     Discharging provider: Rose Mackey MD. To contact this provider call [phone] and ask  to page, if not available ask for triage hospitalist to be paged. Primary care provider: Roger Nichols MD  . . . . . . . . . . . . . . . . . . . . . . . . . . . . . . . . . . . . . . . . . . . . . . . . . . . . . . . . . . . . . . . . . . . . . . . Michael Early FINAL DIAGNOSES & HOSPITAL COURSE:    1. Pneumonia by CTA    -  on iv levaquin.  Blood culture NGTD   - Place on duonebs and supplemental oxygen. - po steroids  - completed Levaquin      2. KEISHA   - not using cpap at home cont supplemental oxygen. - need ourt pt sleep study   - wean off O2 as tolerates     3. LE  cellulitis - completed Levaquin. d/c vanc no fever or high WBC count      4. Body mass index is 62.02 kg/m². morbid obesity      5. Hx of hypothyroidism - TSH WNL     6. She is on anti arrhythmics Tambocor and CCB  - cont for now will f/u with her cardiologist no acute issues    Depression. Resume all home meds       FOLLOW-UP CARE RECOMMENDATIONS/TESTING/NURSING ORDERS:  · PT/OT  · Wean off oxygen  · Bmp tomorrow      DIET:  Low fat, Low cholesterol    ACTIVITY:  Activity as tolerated, PT/OT to evaluate and treat and OOB for meals    APPOINTMENTS:  · Follow-up with primary care provider, Dr. Roger Nichols MD  -  Please call to set up an appointment to be seen in  1 week    It is very important that you keep follow-up appointment(s). Bring discharge papers, medication list (and/or medication bottles) to follow-up appointments for review by outpatient provider(s). PENDING TEST RESULTS:  At the time of discharge the following test results are still pending: none. Please review these results as they become available.     Specific symptoms to watch for: chest pain, shortness of breath, fever, chills, nausea, vomiting, diarrhea, change in mentation, falling, weakness, bleeding.           GOALS OF CARE:  X  Eventual return to home/independent/assisted living     Long term SNF      Hospice     No rehospitalization     Patient condition at discharge:   Functional status    Poor      Deconditioned      Independent   Cognition    Lucid     Forgetful (some sensescence)     Dementia   Catheters/lines (plus indication)    Chandra     PICC      PEG         Code status  X  Full code      DNR         CHRONIC MEDICAL CONDITIONS:  Problem List as of 4/9/2019 Date Reviewed: 12/15/2017          Codes Class Noted - Resolved    Pneumonia ICD-10-CM: J18.9  ICD-9-CM: 486  4/3/2019 - Present        Subclinical hypothyroidism ICD-10-CM: E03.9  ICD-9-CM: 244.8  12/19/2017 - Present        Morbid obesity with body mass index of 60.0-69.9 in adult [facility]) (Chronic) ICD-10-CM: E66.01, Z68.44  ICD-9-CM: 278.01, V85.44  3/1/2016 - Present        KEISHA (obstructive sleep apnea) ICD-10-CM: G47.33  ICD-9-CM: 327.23  10/11/2010 - Present    Overview Signed 10/11/2010  4:25 PM by Marla Burkett MD     CPAP             Essential hypertension, benign ICD-10-CM: I10  ICD-9-CM: 401.1  2/20/2010 - Present        PSVT (paroxysmal supraventricular tachycardia) ([facility] ICD-10-CM: I47.1  ICD-9-CM: 427.0  2/20/2010 - Present    Overview Addendum 9/9/2013  4:31 PM by MD Dr. Sherrie Pittman             Depression ICD-10-CM: F32.9  ICD-9-CM: 743  2/20/2010 - Present    Overview Signed 2/28/2012 11:28 AM by MD Dr. Dolores Pittman Corn tunnel syndrome ICD-10-CM: G56.00  ICD-9-CM: 354.0  2/20/2010 - Present    Overview Signed 2/20/2010  2:44 PM by Marla Burkett MD     b/l             Pure hypercholesterolemia ICD-10-CM: E78.00  ICD-9-CM: 272.0  2/20/2010 - Present        RESOLVED: Obesity ICD-10-CM: E66.9  ICD-9-CM: 278.00  2/20/2010 - 3/1/2016                Information obtained by :   I understand that if any problems occur once I am at home I am to contact my physician. I understand and acknowledge receipt of the instructions indicated above.                                                                                                                                              Physician's or R.N.'s Signature                                                                  Date/Time                                                                                                                                              Patient or Representative Signature                                                          Date/Time

## 2019-04-09 NOTE — PROGRESS NOTES
Bedside and Verbal shift change report given to [address] Huggins (oncoming nurse) by Angeles Raya RN (offgoing nurse). Report included the following information SBAR, Kardex and MAR.

## 2019-04-09 NOTE — DISCHARGE SUMMARY
Discharge Instructions/Summary     Patient: Debra Parra       MRN: [id number]       YOB: 1960       Age: 59 y.o. Date of admission:  4/3/2019    Date of discharge:  4/9/2019    Primary care provider:  Earline Singleton MD     Admitting provider:  Jeff Greene DO    Discharging provider:  Ronad Castorena MD , to contact this individual call [phone] and ask the  to page, if unavailable ask for the triage hospitalist to be paged. Consultations  · None    Procedures/Surgeries  * No surgery found *    Discharge destination: [facility]. The patient is stable for discharge. Admission diagnosis  · Pneumonia [J18.9]      [address] COURSE:  Vivian Parmar a 59 y.o.   female who presents with dyspnea over the last 4 days with tayler colored sputum.  The pt has a hisotry of keisha but is noncompliant with cpap.  The pt was evaluated at [facility] 2 days and was dx with pneumonia and cellulitis and placed on doxycycline.  She reports fever of 100.9.  She denies any nausea or vomiting.  She states she doesn't use oxygen at home. 1. Pneumonia by CTA    -  on iv levaquin.  Blood culture NGTD   - Place on duonebs and supplemental oxygen. - po steroids  - completed Levaquin      2. KEISHA   - not using cpap at home. cont supplemental oxygen. - need ourt pt sleep study   - wean off O2 as tolerates     3. LE  cellulitis - completed Levaquin. d/c vanc no fever or high WBC count      4. Body mass index is 62.02 kg/m². morbid obesity      5. Hx of hypothyroidism - TSH WNL     6. She is on anti arrhythmics Tambocor and CCB  - cont for now will f/u with her cardiologist no acute issues    Depression.  Resume all home meds       Current Discharge Medication List      START taking these medications    Details   predniSONE (DELTASONE) 20 mg tablet Take 40 mg by mouth daily (with breakfast) for 5 days. Qty: 10 Tab, Refills: 0         CONTINUE these medications which have NOT CHANGED    Details   gabapentin (NEURONTIN) 400 mg capsule Take 400 mg by mouth three (3) times daily. oxybutynin chloride XL (DITROPAN XL) 15 mg CR tablet Take 15 mg by mouth daily. benzonatate (TESSALON) 100 mg capsule Take 100 mg by mouth three (3) times daily as needed for Cough. furosemide (LASIX) 20 mg tablet TAKE 1 TABLET BY MOUTH EVERY DAY  Qty: 90 Tab, Refills: 0      rosuvastatin (CRESTOR) 5 mg tablet TAKE 1 TABLET BY MOUTH DAILY  Qty: 90 Tab, Refills: 3      losartan (COZAAR) 50 mg tablet TAKE 1 TABLET BY MOUTH DAILY  Qty: 90 Tab, Refills: 3      verapamil ER (CALAN-SR) 240 mg CR tablet TAKE 1 TABLET BY MOUTH EVERY DAY  Qty: 90 Tab, Refills: 3    Comments: **Patient requests 90 days supply**      suvorexant (BELSOMRA) 20 mg tablet Take 20 mg by mouth nightly. buPROPion XL (WELLBUTRIN XL) 300 mg XL tablet Take 300 mg by mouth every morning. flecainide (TAMBOCOR) 100 mg tablet Take 100 mg by mouth daily. duloxetine (CYMBALTA) 60 mg capsule Take 120 mg by mouth daily. STOP taking these medications       doxycycline (VIBRA-TABS) 100 mg tablet Comments:   Reason for Stopping:         methylPREDNISolone (MEDROL, BABATUNDE,) 4 mg tablet Comments:   Reason for Stopping:                 FOLLOW-UP CARE RECOMMENDATIONS/TESTING/NURSING ORDERS:  · PT/OT  · Wean off oxygen  · Bmp tomorrow      DIET:  Low fat, Low cholesterol    ACTIVITY:  Activity as tolerated, PT/OT to evaluate and treat and OOB for meals      APPOINTMENTS:  · Follow-up with primary care provider, Dr. Loulou Colón MD  -  Please call to set up an appointment to be seen in  1 week      PENDING TEST RESULTS:  At the time of discharge the following test results are still pending: none. Please review these results as they become available.     Specific symptoms to watch for: chest pain, shortness of breath, fever, chills, nausea, vomiting, diarrhea, change in mentation, falling, weakness, bleeding. GOALS OF CARE:  X  Eventual return to home/independent/assisted living     Long term SNF      Hospice     No rehospitalization     SOCIAL HISTORY:     Social History     Socioeconomic History    Marital status:      Spouse name: Not on file    Number of children: Not on file    Years of education: Not on file    Highest education level: Not on file   Occupational History    Not on file   Social Needs    Financial resource strain: Not on file    Food insecurity:     Worry: Not on file     Inability: Not on file    Transportation needs:     Medical: Not on file     Non-medical: Not on file   Tobacco Use    Smoking status: Never Smoker    Smokeless tobacco: Never Used   Substance and Sexual Activity    Alcohol use:  Yes     Alcohol/week: 0.6 oz     Types: 1 Glasses of wine per week     Comment: seldom    Drug use: Yes     Types: Prescription, OTC    Sexual activity: Yes     Partners: Male     Birth control/protection: None   Lifestyle    Physical activity:     Days per week: Not on file     Minutes per session: Not on file    Stress: Not on file   Relationships    Social connections:     Talks on phone: Not on file     Gets together: Not on file     Attends [employer] service: Not on file     Active member of club or organization: Not on file     Attends meetings of clubs or organizations: Not on file     Relationship status: Not on file    Intimate partner violence:     Fear of current or ex partner: Not on file     Emotionally abused: Not on file     Physically abused: Not on file     Forced sexual activity: Not on file   Other Topics Concern    Not on file   Social History Narrative    Not on file       Patient condition at discharge:   Functional status    Poor    X  Deconditioned      Independent   Cognition    Lucid     Forgetful (some sensescence)     Dementia   Catheters/lines (plus indication)    Chandra     PICC      PEG Code status  X  Full code      DNR         CHRONIC MEDICAL CONDITIONS:  Problem List as of 4/9/2019 Date Reviewed: 12/15/2017          Codes Class Noted - Resolved    Pneumonia ICD-10-CM: J18.9  ICD-9-CM: 424  4/3/2019 - Present        Subclinical hypothyroidism ICD-10-CM: E03.9  ICD-9-CM: 244.8  12/19/2017 - Present        Morbid obesity with body mass index of 60.0-69.9 in adult [facility]) (Chronic) ICD-10-CM: E66.01, Z68.44  ICD-9-CM: 278.01, V85.44  3/1/2016 - Present        KEISHA (obstructive sleep apnea) ICD-10-CM: G47.33  ICD-9-CM: 327.23  10/11/2010 - Present    Overview Signed 10/11/2010  4:25 PM by Maribell Puckett MD     CPAP             Essential hypertension, benign ICD-10-CM: I10  ICD-9-CM: 401.1  2/20/2010 - Present        PSVT (paroxysmal supraventricular tachycardia) ([facility] [address] ICD-10-CM: I47.1  ICD-9-CM: 427.0  2/20/2010 - Present    Overview Addendum 9/9/2013  4:31 PM by MD Dr. Jj Wilkins Pacer             Depression ICD-10-CM: F32.9  ICD-9-CM: 562  2/20/2010 - Present    Overview Signed 2/28/2012 11:28 AM by MD Dr. Ignacio Wilkins Lawler tunnel syndrome ICD-10-CM: G56.00  ICD-9-CM: 354.0  2/20/2010 - Present    Overview Signed 2/20/2010  2:44 PM by Maribell Puckett MD     b/l             Pure hypercholesterolemia ICD-10-CM: E78.00  ICD-9-CM: 272.0  2/20/2010 - Present        RESOLVED: Obesity ICD-10-CM: E66.9  ICD-9-CM: 278.00  2/20/2010 - 3/1/2016                  Physical examination at discharge  Visit Vitals  /62 (BP 1 Location: Left arm, BP Patient Position: At rest)   Pulse 77   Temp 97.8 °F (36.6 °C)   Resp 16   Ht 5' 9\" (1.753 m)   Wt (!) 190.5 kg (420 lb)   SpO2 96%   BMI 62.02 kg/m²     Constitutional:  No acute distress, cooperative, obese   ENT:  Oral mucous moist, oropharynx benign. Neck supple,    Resp:  CTA bilaterally. No wheezing/rhonchi/rales. CV:  Regular rhythm, normal rate, no murmurs, gallops, rubs    GI:  Soft, non distended, non tender. bs+    Musculoskeletal:  No edema, warm, 2+ pulses throughout    Neurologic:  Moves all extremities. AAOx3, CN II-XII reviewed                         Psych:  Good insight, Not anxious nor agitated. Significant Diagnostic Studies:   4/3/2019: BUN 23 MG/DL* (Ref range: 6 - 20 MG/DL); Calcium 8.5 MG/DL (Ref range: 8.5 - 10.1 MG/DL); CO2 29 mmol/L (Ref range: 21 - 32 mmol/L); Creatinine 1.19 MG/DL* (Ref range: 0.55 - 1.02 MG/DL); Glucose 118 mg/dL* (Ref range: 65 - 100 mg/dL); HCT 35.9 % (Ref range: 35.0 - 47.0 %); HGB 11.0 g/dL* (Ref range: 11.5 - 16.0 g/dL); Potassium 3.5 mmol/L (Ref range: 3.5 - 5.1 mmol/L); Sodium 139 mmol/L (Ref range: 136 - 145 mmol/L)  4/4/2019: BUN 19 MG/DL (Ref range: 6 - 20 MG/DL); Calcium 8.1 MG/DL* (Ref range: 8.5 - 10.1 MG/DL); CO2 26 mmol/L (Ref range: 21 - 32 mmol/L); Creatinine 1.04 MG/DL* (Ref range: 0.55 - 1.02 MG/DL); Glucose 157 mg/dL* (Ref range: 65 - 100 mg/dL); HCT 35.6 % (Ref range: 35.0 - 47.0 %); HGB 11.1 g/dL* (Ref range: 11.5 - 16.0 g/dL); Potassium 3.8 mmol/L (Ref range: 3.5 - 5.1 mmol/L); Sodium 141 mmol/L (Ref range: 136 - 145 mmol/L)  No results for input(s): WBC, HGB, HCT, PLT, HGBEXT, HCTEXT, PLTEXT in the last 72 hours. No results for input(s): NA, K, CL, CO2, BUN, CREA, GLU, CA, MG, PHOS, URICA in the last 72 hours. No results for input(s): SGOT, GPT, AP, TBIL, TP, ALB, GLOB, GGT, AML, LPSE in the last 72 hours. No lab exists for component: AMYP, HLPSE  No results for input(s): INR, PTP, APTT in the last 72 hours. No lab exists for component: INREXT   No results for input(s): FE, TIBC, PSAT, FERR in the last 72 hours. No results for input(s): PH, PCO2, PO2 in the last 72 hours. No results for input(s): CPK, CKMB in the last 72 hours.     No lab exists for component: TROPONINI  No components found for: [place]    Pertinent imaging studies:    CTA:   Atelectasis/consolidation in the right lower and right upper lobes with  associated groundglass opacity. Imaging findings are most consistent with a  multifocal pneumonia. Time spent on discharge related activities today greater than 30 minutes.       Signed:  Jeanna Barreto MD                 Hospitalist                 4/9/2019                 12:49 PM        Cc: Adelaida Craig MD

## 2019-04-09 NOTE — PROGRESS NOTES
Chart reviewed. ENEDELIA spoke with Shania Golden with [facility]. They have accepted patient and have a bed today. MD is aware. ENEDELIA requested stretcher transport with [facility]. CM faxed MAR to [phone]. [facility] can accommodate a 4:30 PM stretcher transport to [facility]. Raissa Bond is aware. Discharge folder located on hard chart to include ambulance form and discharge instructions. RN to follow with Kardex, [facility], [place], and call report to #[phone]. Tk Suárez, RUSS/CRM

## 2019-04-10 ENCOUNTER — PATIENT OUTREACH (OUTPATIENT)
Dept: INTERNAL MEDICINE CLINIC | Age: 59
End: 2019-04-10

## 2019-04-10 NOTE — PROGRESS NOTES
Hospital to [address] [place] [address] 59 y.o.   female Niya 32   Room: [address] [place] [address] JOINT  Unit Phone# :  [phone] [facility] [address] Dept: [phone] Loc: [phone] SITUATION Admitted:  4/3/2019         Attending Provider:  No att. providers found Consultations:  None PCP:  Kavya Menjivar MD   [phone] Treatment Team: Utilization Review: [facility]; Care Manager: RUSS Morgan; Care Manager: [place] Sport Admitting Dx:  Pneumonia [J18.9] Principal Problem: <principal problem not specified> * No surgery found * of  
  
BY: * Surgery not found *             ON: * No surgery found * Code Status: Prior Advance Directives:  
Advance Care Planning 4/5/2019 Confirm Advance Directive None (Send w/patient) Not Received Isolation:  There are currently no Active Isolations       MDRO: No current active infections Pain Medications given:  Tylenol    Last dose: 4/9/2019 at  0930 Special Equipment needed: yes  Type of equipment: bariatric walker and bedside commode, oxygen 2L NC 
 
(Not currently on dialysis) (Not currently on dialysis) (Not currently on dialysis) BACKGROUND Allergies: Allergies Allergen Reactions  Codeine Other (comments)  
  hallucinates  Lipitor [Atorvastatin] Myalgia  Pcn [Penicillins] Hives Past Medical History:  
Diagnosis Date  Cholelithiasis  Hypertension  KEISHA (obstructive sleep apnea) Uses CPAP  
 PSVT (paroxysmal supraventricular tachycardia) ([facility] [address] Paroxysmal Supraventricular Tachycardia  Psychiatric disorder Depression  Unspecified adverse effect of anesthesia May 2006 Postoperative hypoxemia, treated w/ O2 & IV Diuretics Past Surgical History:  
Procedure Laterality Date [address]  HX CHOLECYSTECTOMY  HX CYST INCISION AND DRAINAGE Right 06/2014  HX DILATION AND CURETTAGE  May 2006  HX GYN  2008 Uterine Ablation  HX TONSIL AND ADENOIDECTOMY  REMOVAL GALLBLADDER No medications prior to admission. Hard scripts included in transfer packet no Vaccinations:   
Immunization History Administered Date(s) Administered  Influenza Vaccine (Quad) PF 09/26/2016, 10/16/2017  Influenza Vaccine PF 09/09/2013  Influenza Vaccine Split 11/06/2012 Readmission Risks:    Known Risks: limited mobility, acute need for oxygen, morbid obesity The Charlson Co[employer]bitiy Index tool is an evidenced based tool that has more automatic generated information. The tool looks at many different items such as the age of the patient, how many times they were admitted in the last calendar year, current length of stay in the hospital and their diagnosis. All of these items are pulled automatically from information documented in the chart from various places and will generate a score that predicts whether a patient is at low (less than 13), medium (13-20) or high (21 or greater) risk of being readmitted. ASSESSMENT Temp: 97.3 °F (36.3 °C) (04/09/19 1520) Pulse (Heart Rate): 77 (04/09/19 1520) Resp Rate: 16 (04/09/19 1520)           BP: 148/76 (04/09/19 1520) O2 Sat (%): 97 % (04/09/19 1520) Weight: (!) 190.5 kg (420 lb)    Height: 5' 9\" (175.3 cm) (04/03/19 2158) If above not within 1 hour of discharge: 
 
BP:_____  P:____  R:____ T:_____ O2 Sat: ___%  O2: ______ Active Orders There are no active orders of the following types: Diet. Orientation: oriented to time, place, person and situation Active Behaviors: None Active Lines/Drains:  (Peg Tube / Chandra / CL or S/L?): no 
 
 Urinary Status: Voiding     Last BM: Last Bowel Movement Date: 04/08/19 Skin Integrity: Other (comment)(LLE) Mobility: Slightly limited Weight Bearing Status: WBAT (Weight Bearing as Tolerated) Gait Training Assistive Device: Cane, straight, Other (comment)(supplemental O2-portable tank) Ambulation - Level of Assistance: Contact guard assistance Distance (ft): 110 Feet (ft) Lab Results Component Value Date/Time Glucose 203 (H) 04/06/2019 05:56 AM  
 Hemoglobin A1c 5.6 09/26/2016 10:46 AM  
 HGB 11.1 (L) 04/04/2019 03:02 AM  
 HGB 11.0 (L) 04/03/2019 04:56 PM  
  
  RECOMMENDATION See After Visit Summary (AVS) for: · Discharge instructions · [facility] · Special equipment needed (entered pre-discharge by Care Management) · Medication Reconciliation · Follow up Appointment(s) Report given/sent by:  Malinda Lora Verbal report given to: [facility]  FAXED to:  [facility] Estimated discharge time:  4/9/2019 at 1634

## 2019-04-10 NOTE — PROGRESS NOTES
Transition of Care Coordination/Hospital to Post Acute Facility: 
  
Date/Time:  4/10/2019 10:13 AM 
 
Patient was admitted to [facility] on 4/3/19 for treatment of Pneumonia. Patient was discharged 19 to [facility] for continuation of care. Inpatient RRAT score:11 Top Challenges reviewed Pneumonia - non compliant with CPAP. Completed levaquin - d/c to [facility] on prednisone Needs outpatient sleep study Method of communication with care team :chart routing Nurse Navigator(NN) spoke with Latisha Sanchez to provide introduction to self and explanation of the Nurse Navigator Role. Verified name and  as patient identifiers. Discussed and reviewed  anticipated length of stay, diet restrictions, discharge summary, follow up appointments, medication reconciliation, pending labs at time of discharge, recommendations for future lab/imaging, wound care orders ACP:  
Does the patient have a current ACP (including DDNR):  no 
Does the post acute facility have a copy of the patients ACP:  N/A Medication(s):  
New Medications at Discharge:  
predniSONE (DELTASONE) 20 mg tablet Take 40 mg by mouth daily (with breakfast) for 5 days. Qty: 10 Tab, Refills: 0 Changed Medications at Discharge: n/a Discontinued Medications at Discharge:  
 doxycycline (VIBRA-TABS) 100 mg tablet Comments:  
Reason for Stopping:   
     
  methylPREDNISolone (MEDROL, BABATUNDE,) 4 mg tablet Comments:  
Reason for Stopping:   
     
 
PCP/Specialist follow up: No future appointments. Opportunity to ask questions was provided. Contact information was provided for future reference or further questions. Will continue to monitor. 19 Incoming Fax from Latisha Sanchez - patient will be discharging from [facility] on . NN to f/u with patient post discharge.  AR

## 2019-04-10 NOTE — PROGRESS NOTES
Attempted to call report twice, line transferred to unit/wing phone and not answered. D/t time constraints I was unable to attempt to call report a third time.

## 2019-04-17 ENCOUNTER — TELEPHONE (OUTPATIENT)
Dept: INTERNAL MEDICINE CLINIC | Age: 59
End: 2019-04-17

## 2019-04-17 NOTE — TELEPHONE ENCOUNTER
Pt has been discharged from rehab and has Home Health visiting tomorrow. She wants to check on rescheduling her appointment for tomorrow. Please contact her. Thanks.

## 2019-04-19 ENCOUNTER — PATIENT OUTREACH (OUTPATIENT)
Dept: INTERNAL MEDICINE CLINIC | Age: 59
End: 2019-04-19

## 2019-04-19 NOTE — LETTER
[place] 38 [address] [place] [address] At any age, we need to think about our wishes if something catastrophic were to happen medically. If you were not able to speak for yourself, is there a way that your wishes were followed if you cannot meaningfully recover? There is a way you can let your medical team know if you do not want (or want) certain medical procedures in the event that you cannot recover from something back to good quality of life. Please find enclosed the documents and papers regarding making an Advance Care Plan. You just need to pick your 2 \"spokespeople\" and let them know what you want. Then you decide what you want by checking the boxes. Lastly, you sign the last page with two people watching you and they sign the witness blanks. These papers do NOT need to be notarized, are effective from the date signed and can be changed or revoked at any time. When you do complete the paperwork, please bring them to your Primary Care office. A copy will be made for their records and the original returned to you for safekeeping. The papers will then be available for all Caprice Hankins. If you have any questions please feel free to call me at [phone]. Sincerely, Neno Mack, MSN, RN, CRRN, CNL

## 2019-04-19 NOTE — PROGRESS NOTES
Hospital Discharge Follow-Up Date/Time:  19 2:14 PM 
 
Patient was admitted to [facility] on 4/3/19 for treatment of Pneumonia. Patient was discharged 19 to [facility] for continuation of care. Patient was discharged from [facility] on 19 and returned home. Top Challenges reviewed with the provider Patient d/c from [facility] & returned home w/ [facility] Patient reported loose stool - HH to collect stool sample Method of communication with provider :chart routing Inpatient RRAT score: 11 Was this a readmission? no  
Patient stated reason for the readmission: n/a Nurse Navigator (NN) contacted the patient by telephone to perform post hospital discharge assessment. Verified name and  with patient as identifiers. Provided introduction to self, and explanation of the Nurse Navigator role. Reviewed discharge instructions and red flags with patient who verbalized understanding. Patient given an opportunity to ask questions and does not have any further questions or concerns at this time. The patient agrees to contact the PCP office for questions related to their healthcare. NN provided contact information for future reference. Disease Specific:   N/A Home Health orders at discharge: PT, SN Home Health company: [employer] Date of initial visit: 19 Durable Medical Equipment ordered/company: none Durable Medical Equipment received: n/a Barriers to care? lack of knowledge about disease Advance Care Planning:  
Does patient have an Advance Directive:  not on file; education provided Medication(s): Patient reports no medication changes from SNF at discharge New Medications at Discharge:n/a Changed Medications at Discharge: n/a Discontinued Medications at Discharge:n/a 
 
Medication reconciliation was performed with patient, who verbalizes understanding of administration of home medications.   There were no barriers to obtaining medications identified at this time. Referral to Pharm D needed: no  
 
Current Outpatient Medications Medication Sig  cyclobenzaprine (FLEXERIL) 5 mg tablet Take 1 Tab by mouth two (2) times daily as needed for Muscle Spasm(s).  gabapentin (NEURONTIN) 400 mg capsule Take 400 mg by mouth three (3) times daily.  oxybutynin chloride XL (DITROPAN XL) 15 mg CR tablet Take 15 mg by mouth daily.  furosemide (LASIX) 20 mg tablet TAKE 1 TABLET BY MOUTH EVERY DAY  rosuvastatin (CRESTOR) 5 mg tablet TAKE 1 TABLET BY MOUTH DAILY  losartan (COZAAR) 50 mg tablet TAKE 1 TABLET BY MOUTH DAILY  verapamil ER (CALAN-SR) 240 mg CR tablet TAKE 1 TABLET BY MOUTH EVERY DAY  suvorexant (BELSOMRA) 20 mg tablet Take 20 mg by mouth nightly.  buPROPion XL (WELLBUTRIN XL) 300 mg XL tablet Take 300 mg by mouth every morning.  flecainide (TAMBOCOR) 100 mg tablet Take 100 mg by mouth daily.  duloxetine (CYMBALTA) 60 mg capsule Take 120 mg by mouth daily. No current facility-administered medications for this visit. There are no discontinued medications. [facility] follow up appointment(s):  
No future appointments. Non-[facility] follow up appointment(s): n/a [facility]:  information provided as a resource Goals  Prepare patients and caregivers for end of life decisions (ie. need for hospice, pain management, symptom relief, advance directives etc.)   
  04/25/19 ACP: Spoke to patient today. Patient does not have ACP on file, patient accepted education and is interested in ACP. ACP information packet mailed to patients home address. Patient/family to review and make list of any questions to review w/ NN. NN to f/u 1 week. AR  
 
  
  Understands red flags post discharge. 04/25/19 Spoke to patient today. Reviewed red flag s/s with patient, nausea, vomiting, inability to pass urine/stool, SOB, mental status change, chest pain, fever. Patient reports loose stool. She states that Home health Nurse assessed yesterday and patient has supplies to collect stool sample. Last loose stool was 4am. Discussed maintaining hydration and proper hygiene and precautions. Patient declined [facility] Visit today but has contact information if she needs them over the weekend. Patient attended [facility] f/u with Dr. Dakota Martin on 4/22.  
 
- Schedule outpatient sleep study - Increase activity - exercise 3x week - Monitor bowel movements 
- Contact MD/NN with worsening diarrhea or other red flag s/s Patient verbalized understanding of the above plan. NN to f/u Monday with patient.  AR

## 2019-04-22 ENCOUNTER — OFFICE VISIT (OUTPATIENT)
Dept: INTERNAL MEDICINE CLINIC | Age: 59
End: 2019-04-22

## 2019-04-22 VITALS
OXYGEN SATURATION: 99 % | SYSTOLIC BLOOD PRESSURE: 148 MMHG | BODY MASS INDEX: 43.4 KG/M2 | WEIGHT: 293 LBS | TEMPERATURE: 97.9 F | HEART RATE: 86 BPM | RESPIRATION RATE: 18 BRPM | HEIGHT: 69 IN | DIASTOLIC BLOOD PRESSURE: 73 MMHG

## 2019-04-22 DIAGNOSIS — Z09 FOLLOW-UP EXAMINATION, FOLLOWING OTHER SURGERY: Primary | ICD-10-CM

## 2019-04-22 DIAGNOSIS — M25.552 LEFT HIP PAIN: ICD-10-CM

## 2019-04-22 DIAGNOSIS — J18.9 PNEUMONIA OF RIGHT LUNG DUE TO INFECTIOUS ORGANISM, UNSPECIFIED PART OF LUNG: ICD-10-CM

## 2019-04-22 DIAGNOSIS — E66.01 MORBID OBESITY (HCC): ICD-10-CM

## 2019-04-22 DIAGNOSIS — G47.33 OSA (OBSTRUCTIVE SLEEP APNEA): Primary | ICD-10-CM

## 2019-04-22 DIAGNOSIS — M62.838 MUSCLE SPASM: ICD-10-CM

## 2019-04-22 DIAGNOSIS — Z09 HOSPITAL DISCHARGE FOLLOW-UP: ICD-10-CM

## 2019-04-22 RX ORDER — CYCLOBENZAPRINE HCL 5 MG
5 TABLET ORAL
Qty: 14 TAB | Refills: 0 | Status: SHIPPED | OUTPATIENT
Start: 2019-04-22 | End: 2020-03-26 | Stop reason: ALTCHOICE

## 2019-04-22 NOTE — PROGRESS NOTES
CC: Pneumonia and Fall (hip pain . Suresh Bray while in the hospital )      HPI:    She is a 62 y.o. female who presents for evaluation of hospital follow up. She was recently admitted for PNA    Date of admission:  4/3/2019     Date of discharge:  4/9/2019    Reviewed her hospital course  She had PNA seen on CTA of the chest  She was treated with IV levaquin and blood cultures on negative. She was given nebs. She completed levaquin and steroids.     During admission she was also had cellulitis on Lower extremity left and this improved  She overall feels better denies cough  She complains of hip pain on the left - fell during imaging in the hospital - \" caught prior to hitting floor\" since then has pain in left hip. Able to bear weight  When stands has shooting pain down the leg. ROS:  Constitutional: negative for fevers, chills, anorexia and weight loss  10 systems reviewed and negative other than  HPI    Past Medical History:   Diagnosis Date    Cholelithiasis     Hypertension     KEISHA (obstructive sleep apnea)     Uses CPAP    PSVT (paroxysmal supraventricular tachycardia) (HCC)     Paroxysmal Supraventricular Tachycardia    Psychiatric disorder     Depression    Unspecified adverse effect of anesthesia May 2006    Postoperative hypoxemia, treated w/ O2 & IV Diuretics       Current Outpatient Medications on File Prior to Visit   Medication Sig Dispense Refill    gabapentin (NEURONTIN) 400 mg capsule Take 400 mg by mouth three (3) times daily.  oxybutynin chloride XL (DITROPAN XL) 15 mg CR tablet Take 15 mg by mouth daily.  furosemide (LASIX) 20 mg tablet TAKE 1 TABLET BY MOUTH EVERY DAY 90 Tab 0    rosuvastatin (CRESTOR) 5 mg tablet TAKE 1 TABLET BY MOUTH DAILY 90 Tab 3    losartan (COZAAR) 50 mg tablet TAKE 1 TABLET BY MOUTH DAILY 90 Tab 3    verapamil ER (CALAN-SR) 240 mg CR tablet TAKE 1 TABLET BY MOUTH EVERY DAY 90 Tab 3    suvorexant (BELSOMRA) 20 mg tablet Take 20 mg by mouth nightly.  buPROPion XL (WELLBUTRIN XL) 300 mg XL tablet Take 300 mg by mouth every morning.  flecainide (TAMBOCOR) 100 mg tablet Take 100 mg by mouth daily.  duloxetine (CYMBALTA) 60 mg capsule Take 120 mg by mouth daily. No current facility-administered medications on file prior to visit. Past Surgical History:   Procedure Laterality Date    DILATION AND CURETTAGE      HX  SECTION      HX CHOLECYSTECTOMY      HX CYST INCISION AND DRAINAGE Right 2014    HX DILATION AND CURETTAGE  May 2006    HX GYN  2008    Uterine Ablation    HX TONSIL AND ADENOIDECTOMY      REMOVAL GALLBLADDER         Family History   Problem Relation Age of Onset   [facility] Cancer Mother         Lung or breast, patient unsure    Heart Attack Father     Diabetes Sister      Reviewed and no changes     Social History     Socioeconomic History    Marital status:      Spouse name: Not on file    Number of children: Not on file    Years of education: Not on file    Highest education level: Not on file   Occupational History    Not on file   Social Needs    Financial resource strain: Not on file    Food insecurity:     Worry: Not on file     Inability: Not on file    Transportation needs:     Medical: Not on file     Non-medical: Not on file   Tobacco Use    Smoking status: Never Smoker    Smokeless tobacco: Never Used   Substance and Sexual Activity    Alcohol use:  Yes     Alcohol/week: 0.6 oz     Types: 1 Glasses of wine per week     Comment: seldom    Drug use: Yes     Types: Prescription, OTC    Sexual activity: Yes     Partners: Male     Birth control/protection: None   Lifestyle    Physical activity:     Days per week: Not on file     Minutes per session: Not on file    Stress: Not on file   Relationships    Social connections:     Talks on phone: Not on file     Gets together: Not on file     Attends Gnosticism service: Not on file     Active member of club or organization: Not on file Attends meetings of clubs or organizations: Not on file     Relationship status: Not on file    Intimate partner violence:     Fear of current or ex partner: Not on file     Emotionally abused: Not on file     Physically abused: Not on file     Forced sexual activity: Not on file   Other Topics Concern    Not on file   Social History Narrative    Not on file            Visit Vitals  /73 (BP 1 Location: Right arm, BP Patient Position: Sitting)   Pulse 86   Temp 97.9 °F (36.6 °C) (Oral)   Resp 18   Ht 5' 9\" (1.753 m)   Wt (!) 438 lb (198.7 kg)   SpO2 99%   BMI 64.68 kg/m²       Physical Examination:   General - Well appearing female, morbidly obese  HEENT - PERRL, TM no erythema/opacification, normal nasal turbinates, oropharynx no erythema or exudate, MMM  Neck - supple, no bruits, no TMG, no LAD  Pulm - clear to auscultation bilaterally  Cardio - RRR, normal S1 S2, no murmur gallops or rubs  Abd - soft, nontender, no masses, no HSM  Extrem - no edema, +2 distal pulses  Psych - normal affect, appropriate mood  Left hip is normal on inspection no point tenderness, Hip flexion causes pain.        Lab Results   Component Value Date/Time    WBC 6.4 04/04/2019 03:02 AM    Hemoglobin (POC) 13.3 08/04/2010 09:57 AM    HGB 11.1 (L) 04/04/2019 03:02 AM    Hematocrit (POC) 39 08/04/2010 09:57 AM    HCT 35.6 04/04/2019 03:02 AM    PLATELET 162 56/18/2602 03:02 AM    MCV 94.4 04/04/2019 03:02 AM     Lab Results   Component Value Date/Time    Sodium 138 04/06/2019 05:56 AM    Potassium 3.7 04/06/2019 05:56 AM    Chloride 105 04/06/2019 05:56 AM    CO2 26 04/06/2019 05:56 AM    Anion gap 7 04/06/2019 05:56 AM    Glucose 203 (H) 04/06/2019 05:56 AM    BUN 27 (H) 04/06/2019 05:56 AM    Creatinine 1.31 (H) 04/06/2019 05:56 AM    BUN/Creatinine ratio 21 (H) 04/06/2019 05:56 AM    GFR est AA 50 (L) 04/06/2019 05:56 AM    GFR est non-AA 41 (L) 04/06/2019 05:56 AM    Calcium 8.7 04/06/2019 05:56 AM     Lab Results   Component Value Date/Time    Cholesterol, total 159 12/15/2017 11:00 AM    Cholesterol (POC) 158 05/19/2014 11:54 AM    HDL Cholesterol 52 12/15/2017 11:00 AM    HDL Cholesterol (POC) 57 05/19/2014 11:54 AM    LDL Cholesterol (POC) 59 05/19/2014 11:54 AM    LDL, calculated 91 12/15/2017 11:00 AM    VLDL, calculated 16 12/15/2017 11:00 AM    Triglyceride 80 12/15/2017 11:00 AM    Triglycerides (POC) 209 05/19/2014 11:54 AM     Lab Results   Component Value Date/Time    TSH 1.03 04/04/2019 03:02 AM     No results found for: PSA, Thora Free, [id number], [id number], PSALT  Lab Results   Component Value Date/Time    Hemoglobin A1c 5.6 09/26/2016 10:46 AM    Hemoglobin A1c (POC) 5.0 05/19/2014 11:54 AM     No results found for: Dineshdillon Christie, [id number]    Lab Results   Component Value Date/Time    ALT (SGPT) 28 04/03/2019 04:56 PM    AST (SGOT) 31 04/03/2019 04:56 PM    Alk. phosphatase 85 04/03/2019 04:56 PM    Bilirubin, direct 0.1 08/05/2010 04:30 AM    Bilirubin, total 0.5 04/03/2019 04:56 PM           Assessment/Plan:    61 yo woman with a hx of morbid obesity, KEISHA, recent admission for CAP presenting to follow up from hospitalization      Hospital discharge follow-up: reviewed hospital course admitted for PNA. Improved symptoms  - XR CHEST PA LAT; Future     Morbid obesity ([address] - counseled on weight loss     KEISHA (obstructive sleep apnea)  - needs to have evaluation and fitting for CPAP  - REFERRAL TO PULMONARY DISEASE    Left hip pain - suspect muscle strain from fall. Will obtain x ray and prescribed PRN muscle relaxant. She was advised to call if symptoms do not improve  - cyclobenzaprine (FLEXERIL) 5 mg tablet; Take 1 Tab by mouth two (2) times daily as needed for Muscle Spasm(s). Dispense: 14 Tab; Refill: 0  - XR HIP LT W OR WO PELV 2-3 VWS; Future            Follow-up and Dispositions    · Return for follow up with Dr Mari Burdick.          Shanthi Marinelli MD  Discussed the patient's BMI with her. The BMI follow up plan is as follows:     dietary management education, guidance, and counseling  encourage exercise  monitor weight  prescribed dietary intake    An After Visit Summary was printed and given to the patient.

## 2019-04-22 NOTE — PROGRESS NOTES
Reviewed record in preparation for visit and have obtained necessary documentation. Identified pt with two pt identifiers(name and ). Chief Complaint   Patient presents with    Pneumonia    Fall     hip pain . Adolfo Seth while in the hospital        Health Maintenance Due   Topic Date Due    DTaP/Tdap/Td  (1 - Tdap) 1979    Shingles Vaccine (1 of 2) 2008    Pap Test  2017    Stool testing for trace blood  11/10/2017    Annual Well Visit  2018       Ms. Conner Feliz has a reminder for a \"due or due soon\" health maintenance. I have asked that she discuss this further with her primary care provider for follow-up on this health maintenance. Coordination of Care Questionnaire:  :     1) Have you been to an emergency room, urgent care clinic since your last visit? yes   Hospitalized since your last visit? yes             2) Have you seen or consulted any other health care providers outside of [address] [place] [address] since your last visit? no  (Include any pap smears or colon screenings in this section.)    3) In the event something were to happen to you and you were unable to speak on your behalf, do you have an Advance Directive/ Living Will in place stating your wishes? NO    Do you have an Advance Directive on file? no    4) Are you interested in receiving information on Advance Directives? NO    Patient is accompanied by spouse I have received verbal consent from Jewell Keenan to discuss any/all medical information while they are present in the room.

## 2019-04-22 NOTE — PATIENT INSTRUCTIONS
Body Mass Index: Care Instructions  Your Care Instructions    Body mass index (BMI) can help you see if your weight is raising your risk for health problems. It uses a formula to compare how much you weigh with how tall you are. · A BMI lower than 18.5 is considered underweight. · A BMI between 18.5 and 24.9 is considered healthy. · A BMI between 25 and 29.9 is considered overweight. A BMI of 30 or higher is considered obese. If your BMI is in the normal range, it means that you have a lower risk for weight-related health problems. If your BMI is in the overweight or obese range, you may be at increased risk for weight-related health problems, such as high blood pressure, heart disease, stroke, arthritis or joint pain, and diabetes. If your BMI is in the underweight range, you may be at increased risk for health problems such as fatigue, lower protection (immunity) against illness, muscle loss, bone loss, hair loss, and hormone problems. BMI is just one measure of your risk for weight-related health problems. You may be at higher risk for health problems if you are not active, you eat an unhealthy diet, or you drink too much alcohol or use tobacco products. Follow-up care is a key part of your treatment and safety. Be sure to make and go to all appointments, and call your doctor if you are having problems. It's also a good idea to know your test results and keep a list of the medicines you take. How can you care for yourself at home? · Practice healthy eating habits. This includes eating plenty of fruits, vegetables, whole grains, lean protein, and low-fat dairy. · If your doctor recommends it, get more exercise. Walking is a good choice. Bit by bit, increase the amount you walk every day. Try for at least 30 minutes on most days of the week. · Do not smoke. Smoking can increase your risk for health problems. If you need help quitting, talk to your doctor about stop-smoking programs and medicines. These can increase your chances of quitting for good. · Limit alcohol to 2 drinks a day for men and 1 drink a day for women. Too much alcohol can cause health problems. If you have a BMI higher than 25  · Your doctor may do other tests to check your risk for weight-related health problems. This may include measuring the distance around your waist. A waist measurement of more than 40 inches in men or 35 inches in women can increase the risk of weight-related health problems. · Talk with your doctor about steps you can take to stay healthy or improve your health. You may need to make lifestyle changes to lose weight and stay healthy, such as changing your diet and getting regular exercise. If you have a BMI lower than 18.5  · Your doctor may do other tests to check your risk for health problems. · Talk with your doctor about steps you can take to stay healthy or improve your health. You may need to make lifestyle changes to gain or maintain weight and stay healthy, such as getting more healthy foods in your diet and doing exercises to build muscle. Where can you learn more? Go to http://marylou-abdirahman.info/. Enter S176 in the search box to learn more about \"Body Mass Index: Care Instructions. \"  Current as of: October 13, 2016  Content Version: 11.4  © 9147-7487 [employer], Incorporated. Care instructions adapted under license by "[employer] (which disclaims liability or warranty for this information). If you have questions about a medical condition or this instruction, always ask your healthcare professional. [place] [address] any warranty or liability for your use of this information. Hip Arthritis: Exercises  Your Care Instructions  Here are some examples of exercises for hip arthritis. Start each exercise slowly. Ease off the exercise if you start to have pain.   Your doctor or physical therapist will tell you when you can start these exercises and which ones will work best for you. How to do the exercises  Straight-leg raises to the outside    1. Lie on your side, with your affected hip on top. 2. Tighten the front thigh muscles of your top leg to keep your knee straight. 3. Keep your hip and your leg straight in line with the rest of your body, and keep your knee pointing forward. Do not drop your hip back. 4. Lift your top leg straight up toward the ceiling, about 12 inches off the floor. Hold for about 6 seconds, then slowly lower your leg. 5. Repeat 8 to 12 times. 6. Switch legs and repeat steps 1 through 5, even if only one hip is sore. Straight-leg raises to the inside    1. Lie on your side with your affected hip on the floor. 2. You can either prop up your other leg on a chair, or you can bend that knee and put that foot in front of your other knee. Do not drop your hip back. 3. Tighten the muscles on the front thigh of your bottom leg to straighten that knee. 4. Keep your kneecap pointing forward and your leg straight, and lift your bottom leg up toward the ceiling about 6 inches. Hold for about 6 seconds, then lower slowly. 5. Repeat 8 to 12 times. 6. Switch legs and repeat steps 1 through 5, even if only one hip is sore. Hip hike    1. Stand sideways on the bottom step of a staircase, and hold on to the banister or wall. 2. Keeping both knees straight, lift your good leg off the step and let it hang down. Then hike your good hip up to the same level as your affected hip or a little higher. 3. Repeat 8 to 12 times. 4. Switch legs and repeat steps 1 through 3, even if only one hip is sore. Bridging    1. Lie on your back with both knees bent. Your knees should be bent about 90 degrees. 2. Then push your feet into the floor, squeeze your buttocks, and lift your hips off the floor until your shoulders, hips, and knees are all in a straight line.   3. Hold for about 6 seconds as you continue to breathe normally, and then slowly lower your hips back down to the floor and rest for up to 10 seconds. 4. Repeat 8 to 12 times. Hamstring stretch (lying down)    1. Lie flat on your back with your legs straight. If you feel discomfort in your back, place a small towel roll under your lower back. 2. Holding the back of your affected leg, lift your leg straight up and toward your body until you feel a stretch at the back of your thigh. 3. Hold the stretch for at least 30 seconds. 4. Repeat 2 to 4 times. 5. Switch legs and repeat steps 1 through 4, even if only one hip is sore. Standing quadriceps stretch    1. If you are not steady on your feet, hold on to a chair, counter, or wall. You can also lie on your stomach or your side to do this exercise. 2. Bend the knee of the leg you want to stretch, and reach behind you to grab the front of your foot or ankle with the hand on the same side. For example, if you are stretching your right leg, use your right hand. 3. Keeping your knees next to each other, pull your foot toward your buttock until you feel a gentle stretch across the front of your hip and down the front of your thigh. Your knee should be pointed directly to the ground, and not out to the side. 4. Hold the stretch for at least 15 to 30 seconds. 5. Repeat 2 to 4 times. 6. Switch legs and repeat steps 1 through 5, even if only one hip is sore. Hip rotator stretch    1. Lie on your back with both knees bent and your feet flat on the floor. 2. Put the ankle of your affected leg on your opposite thigh near your knee. 3. Use your hand to gently push your knee away from your body until you feel a gentle stretch around your hip. 4. Hold the stretch for 15 to 30 seconds. 5. Repeat 2 to 4 times. 6. Repeat steps 1 through 5, but this time use your hand to gently pull your knee toward your opposite shoulder. 7. Switch legs and repeat steps 1 through 6, even if only one hip is sore. Knee-to-chest    1.  Lie on your back with your knees bent and your feet flat on the floor. 2. Bring your affected leg to your chest, keeping the other foot flat on the floor (or keeping the other leg straight, whichever feels better on your lower back). 3. Keep your lower back pressed to the floor. Hold for at least 15 to 30 seconds. 4. Relax, and lower the knee to the starting position. 5. Repeat 2 to 4 times. 6. Switch legs and repeat steps 1 through 5, even if only one hip is sore. 7. To get more stretch, put your other leg flat on the floor while pulling your knee to your chest.    Clamshell    1. Lie on your side, with your affected hip on top. Keep your feet and knees together and your knees bent. 2. Raise your top knee, but keep your feet together. Do not let your hips roll back. Your legs should open up like a clamshell. 3. Hold for 6 seconds. 4. Slowly lower your knee back down. Rest for 10 seconds. 5. Repeat 8 to 12 times. 6. Switch legs and repeat steps 1 through 5, even if only one hip is sore. Follow-up care is a key part of your treatment and safety. Be sure to make and go to all appointments, and call your doctor if you are having problems. It's also a good idea to know your test results and keep a list of the medicines you take. Where can you learn more? Go to http://marylou-abdirahman.info/. Enter H929 in the search box to learn more about \"Hip Arthritis: Exercises. \"  Current as of: September 20, 2018  Content Version: 11.9  © 3211-4836 [employer]. Care instructions adapted under license by [employer] (which disclaims liability or warranty for this information). If you have questions about a medical condition or this instruction, always ask your healthcare professional. [address] any warranty or liability for your use of this information.

## 2019-04-24 ENCOUNTER — TELEPHONE (OUTPATIENT)
Dept: INTERNAL MEDICINE CLINIC | Age: 59
End: 2019-04-24

## 2019-04-24 NOTE — TELEPHONE ENCOUNTER
----- Message from Phu Barney sent at 4/24/2019  3:42 PM EDT -----  Regarding: Dr. Debra Gonzalez with [facility] is calling to report that the pt has been having watery stools for two day, vomitting, and nausea. Kassie Gomez would like to get a sample for C-diff. Best contact [phone].     Copy/paste [place]

## 2019-04-25 ENCOUNTER — TELEPHONE (OUTPATIENT)
Dept: INTERNAL MEDICINE CLINIC | Age: 59
End: 2019-04-25

## 2019-04-25 NOTE — TELEPHONE ENCOUNTER
Called and spoke with Ousmane Palomares. Verbal order given to test for c-diff. Pt verbalized understanding of information discussed w/ no further questions at this time.

## 2019-04-26 ENCOUNTER — TELEPHONE (OUTPATIENT)
Dept: INTERNAL MEDICINE CLINIC | Age: 59
End: 2019-04-26

## 2019-04-26 NOTE — TELEPHONE ENCOUNTER
Francisco Martin MD  You 27 minutes ago (12:39 PM)     Take mucinex--cxr 4/22 was normal    Routing comment        Called, spoke to pt. Two identifiers confirmed. Notified pt of Dr. Crista Bell recommendations. Pt verbalized understanding of information discussed w/ no further questions at this time.

## 2019-05-02 ENCOUNTER — PATIENT OUTREACH (OUTPATIENT)
Dept: INTERNAL MEDICINE CLINIC | Age: 59
End: 2019-05-02

## 2019-05-03 RX ORDER — BENZONATATE 100 MG/1
100 CAPSULE ORAL
Qty: 21 CAP | Refills: 0 | Status: SHIPPED | OUTPATIENT
Start: 2019-05-03 | End: 2019-05-10

## 2019-05-03 NOTE — PROGRESS NOTES
Goals  Prepare patients and caregivers for end of life decisions (ie. need for hospice, pain management, symptom relief, advance directives etc.)   
  04/25/19 ACP: Spoke to patient today. Patient does not have ACP on file, patient accepted education and is interested in ACP. ACP information packet mailed to patients home address. Patient/family to review and make list of any questions to review w/ NN. NN to f/u 1 week. AR  
 
05/03/19 ACP: spoke to patient today. Patient is reviewing information, no questions at this time. NN to f/u 1 week. AR 
 
  
  Understands red flags post discharge. 04/25/19 Spoke to patient today. Reviewed red flag s/s with patient, nausea, vomiting, inability to pass urine/stool, SOB, mental status change, chest pain, fever. Patient reports loose stool. She states that Home health Nurse assessed yesterday and patient has supplies to collect stool sample. Last loose stool was 4am. Discussed maintaining hydration and proper hygiene and precautions. Patient declined [facility] Visit today but has contact information if she needs them over the weekend. Patient attended [facility] f/u with Dr. Chelsey Ross on 4/22.  
 
- Schedule outpatient sleep study - Increase activity - exercise 3x week - Monitor bowel movements 
- Contact MD/NN with worsening diarrhea or other red flag s/s Patient verbalized understanding of the above plan. NN to f/u Monday with patient. AR 
 
05/03/19 Spoke to patient today. Patient scheduled sleep study appt on 6/10. Patient reports that bowel movements improved and were no longer lose so stool specimen was not sent. Patient reports cough, although it is improving, she states it is bothering her at night. NN sending in basket message to Dr. Chelsey Ross for Agust[facility] abbott at patients request. Patient reported no other red flag s/s. Reminded patient of red flag s/s that warrant f/u. Patient verbalize understanding and will contact MD/NN. NN to f/u 1 week.  AR

## 2019-05-03 NOTE — TELEPHONE ENCOUNTER
Yessenia COUGHLIN RN  You 37 minutes ago (2:51 PM)     Patient reports that although her cough is improving it is bothering her at night and she is requesting order for Tessalon pearls.  Thanks     Routing comment

## 2019-05-16 ENCOUNTER — PATIENT OUTREACH (OUTPATIENT)
Dept: INTERNAL MEDICINE CLINIC | Age: 59
End: 2019-05-16

## 2019-05-16 NOTE — PROGRESS NOTES
Patient has graduated from the Transitions of Care Coordination  program on 05/16/19 Tere Vargas Patient's symptoms are stable at this time. Patient/family has the ability to self-manage. Care management goals have been completed at this time. No further nurse navigator follow up scheduled. Goals Addressed This Visit's Progress  COMPLETED: Prepare patients and caregivers for end of life decisions (ie. need for hospice, pain management, symptom relief, advance directives etc.)     
  04/25/19 ACP: Spoke to patient today. Patient does not have ACP on file, patient accepted education and is interested in ACP. ACP information packet mailed to patients home address. Patient/family to review and make list of any questions to review w/ NN. NN to f/u 1 week. AR  
 
05/03/19 ACP: spoke to patient today. Patient is reviewing information, no questions at this time. NN to f/u 1 week. AR 
 
05/16/19 Patient reports no questions for NN at this time regarding ACP. Encouraged patient to  Bring at next appt. AR 
 
  
  COMPLETED: Understands red flags post discharge. 04/25/19 Spoke to patient today. Reviewed red flag s/s with patient, nausea, vomiting, inability to pass urine/stool, SOB, mental status change, chest pain, fever. Patient reports loose stool. She states that Home health Nurse assessed yesterday and patient has supplies to collect stool sample. Last loose stool was 4am. Discussed maintaining hydration and proper hygiene and precautions. Patient declined [facility] Visit today but has contact information if she needs them over the weekend. Patient attended [place] f/u with Dr. Willis Hoover on 4/22.  
 
- Schedule outpatient sleep study - Increase activity - exercise 3x week - Monitor bowel movements 
- Contact MD/NN with worsening diarrhea or other red flag s/s Patient verbalized understanding of the above plan. NN to f/u Monday with patient. AR 
 
05/03/19 Spoke to patient today. Patient scheduled sleep study appt on 6/10. Patient reports that bowel movements improved and were no longer lose so stool specimen was not sent. Patient reports cough, although it is improving, she states it is bothering her at night. NN sending in basket message to Dr. Tate Rocha for [place] pearls at patients request. Patient reported no other red flag s/s. Reminded patient of red flag s/s that warrant f/u. Patient verbalize understanding and will contact MD/NN. NN to f/u 1 week. AR 
 
05/16/19 Patient reported no red flag s/s. No further questions/concerns for NN at this time. AR Pt has nurse navigator's contact information for any further questions, concerns, or needs. Patients upcoming visits:  No future appointments.

## 2019-05-23 ENCOUNTER — PATIENT OUTREACH (OUTPATIENT)
Dept: INTERNAL MEDICINE CLINIC | Age: 59
End: 2019-05-23

## 2019-05-23 ENCOUNTER — TELEPHONE (OUTPATIENT)
Dept: INTERNAL MEDICINE CLINIC | Age: 59
End: 2019-05-23

## 2019-05-23 NOTE — TELEPHONE ENCOUNTER
Called, spoke to pt. Two identifiers confirmed.   Pt stated her issue has already been addressed via nn

## 2019-05-23 NOTE — PROGRESS NOTES
05/23/19  Inbound call from patient, patient reports not feeling well, patient stated she is coughing, fever, congestion. NN advised that patient be seen by [facility] as patient also did not sound like she felt good based on auditory assessment. NN contacted [address] [place] [address] and patient was scheduled for appt with [address] [place] [address] today. [facility] will contact patient to confirm appt, NN advised patient they would be calling. Patient is to contact [facility], if patient needs f/u and/or if s/s worsen. AR    1478: PZMOBYS call from [place] at [address] [place] [address] stating that patient informed [facility] that she has to work tonight and request to be seen tomorrow. [facility] has patient on schedule for tomorrow. 1435: NN spoke to patient and confirmed that patient requested to be seen tomorrow.  AR

## 2019-05-23 NOTE — TELEPHONE ENCOUNTER
Patient states she needs a call back in reference to getting an appt today for Flu Like symptoms. Patient states she was [facility] the Hospital. Please call.  Thank you

## 2019-05-24 ENCOUNTER — PATIENT OUTREACH (OUTPATIENT)
Dept: INTERNAL MEDICINE CLINIC | Age: 59
End: 2019-05-24

## 2019-05-24 NOTE — PROGRESS NOTES
05/24/19  NN contacted patient to confirm that [facility] saw patient today. Patient reports that they did see patient. Antibiotic was prescribed for bronchitis. Patient reports that she feels better today and based on auditory assessment by NN patient sounds better. Cough has improved. Patient reported no further questions/concerns for NN at this time.  AR

## 2019-05-28 RX ORDER — FUROSEMIDE 20 MG/1
TABLET ORAL
Qty: 90 TAB | Refills: 0 | Status: SHIPPED | OUTPATIENT
Start: 2019-05-28 | End: 2019-08-30 | Stop reason: SDUPTHER

## 2019-06-04 ENCOUNTER — TELEPHONE (OUTPATIENT)
Dept: INTERNAL MEDICINE CLINIC | Age: 59
End: 2019-06-04

## 2019-06-04 NOTE — TELEPHONE ENCOUNTER
Pt is requesting a copy of her last X-rays from the 22nd of April that were  Requested by PCP. She would like them to be printed on a disc and ready for  (she would like to be contacted when they are available).  She will need the disc by Monday before she goes to see her specialist.   Best contact number is [phone].        Message received & copied from [facility] after closing on 6/3/19

## 2019-06-04 NOTE — TELEPHONE ENCOUNTER
Called, spoke to pt. Two identifiers confirmed. Notified pt XRAY reports will be left up front to be picked up. Pt verbalized understanding of information discussed w/ no further questions at this time.

## 2019-06-24 ENCOUNTER — HOSPITAL ENCOUNTER (OUTPATIENT)
Dept: GENERAL RADIOLOGY | Age: 59
Discharge: HOME | End: 2019-06-24
Payer: MEDICARE

## 2019-06-24 DIAGNOSIS — R06.02 SHORTNESS OF BREATH: ICD-10-CM

## 2019-06-24 PROCEDURE — 71046 X-RAY EXAM CHEST 2 VIEWS: CPT

## 2019-07-08 RX ORDER — DICLOFENAC SODIUM 10 MG/G
GEL TOPICAL
Qty: 100 G | Refills: 0 | Status: SHIPPED | OUTPATIENT
Start: 2019-07-08 | End: 2019-08-14 | Stop reason: SDUPTHER

## 2019-08-05 ENCOUNTER — TELEPHONE (OUTPATIENT)
Dept: INTERNAL MEDICINE CLINIC | Age: 59
End: 2019-08-05

## 2019-08-05 NOTE — TELEPHONE ENCOUNTER
Patient state she needs a call back in reference to getting an appt this Wed., 8/7/19 to be seen for Leg Swelling & also medication eval. Please call.  Thank you

## 2019-08-06 NOTE — TELEPHONE ENCOUNTER
Called, spoke to pt. Two identifiers confirmed. Appointment scheduled for 8/14 @ 36 with Dr. Alba Platt. Pt verbalized understanding of information discussed w/ no further questions at this time.

## 2019-08-14 ENCOUNTER — OFFICE VISIT (OUTPATIENT)
Dept: INTERNAL MEDICINE CLINIC | Age: 59
End: 2019-08-14

## 2019-08-14 VITALS
HEIGHT: 69 IN | DIASTOLIC BLOOD PRESSURE: 100 MMHG | WEIGHT: 293 LBS | HEART RATE: 96 BPM | RESPIRATION RATE: 18 BRPM | BODY MASS INDEX: 43.4 KG/M2 | TEMPERATURE: 98.3 F | SYSTOLIC BLOOD PRESSURE: 171 MMHG | OXYGEN SATURATION: 91 %

## 2019-08-14 DIAGNOSIS — Z23 ENCOUNTER FOR IMMUNIZATION: ICD-10-CM

## 2019-08-14 DIAGNOSIS — Z12.11 COLON CANCER SCREENING: ICD-10-CM

## 2019-08-14 DIAGNOSIS — R60.0 LEG EDEMA, LEFT: Primary | ICD-10-CM

## 2019-08-14 RX ORDER — DICLOFENAC SODIUM 10 MG/G
GEL TOPICAL 4 TIMES DAILY
Qty: 100 G | Refills: 1 | Status: SHIPPED | OUTPATIENT
Start: 2019-08-14 | End: 2020-03-26 | Stop reason: ALTCHOICE

## 2019-08-14 NOTE — PROGRESS NOTES
HISTORY OF PRESENT ILLNESS  Lo Ocampo is a 62 y.o. female. HPI   Hx HTN  HLD PSVT obesity subclinical hypothyroidism    C/o left LE edema xweeks  No calf pain  Had had 2 negative venous dopplers in the past for the same problem  The leg edema is worse at end of the day  Has not been elevating the leg much  Sees AFSHAN MILLER for PSVT and bp has been wnl per pt and no recurrence of PSVT recently per pt    Requests diclofenac gel    Patient Active Problem List    Diagnosis Date Noted    Pneumonia 04/03/2019    Subclinical hypothyroidism 12/19/2017    Morbid obesity with body mass index of 60.0-69.9 in adult [facility]) 03/01/2016    KEISHA (obstructive sleep apnea) 10/11/2010    Essential hypertension, benign 02/20/2010    PSVT (paroxysmal supraventricular tachycardia) ([facility] [address] 02/20/2010    Depression 02/20/2010    Carpal tunnel syndrome 02/20/2010    Pure hypercholesterolemia 02/20/2010     Current Outpatient Medications   Medication Sig Dispense Refill    diclofenac (VOLTAREN) 1 % gel APPLY 4 GRAMS EXTERNALLY TO THE AFFECTED AREA THREE TIMES DAILY 100 g 0    furosemide (LASIX) 20 mg tablet TAKE 1 TABLET BY MOUTH EVERY DAY 90 Tab 0    gabapentin (NEURONTIN) 400 mg capsule Take 400 mg by mouth three (3) times daily.  oxybutynin chloride XL (DITROPAN XL) 15 mg CR tablet Take 15 mg by mouth daily.  rosuvastatin (CRESTOR) 5 mg tablet TAKE 1 TABLET BY MOUTH DAILY 90 Tab 3    losartan (COZAAR) 50 mg tablet TAKE 1 TABLET BY MOUTH DAILY 90 Tab 3    verapamil ER (CALAN-SR) 240 mg CR tablet TAKE 1 TABLET BY MOUTH EVERY DAY 90 Tab 3    suvorexant (BELSOMRA) 20 mg tablet Take 20 mg by mouth nightly.  buPROPion XL (WELLBUTRIN XL) 300 mg XL tablet Take 300 mg by mouth every morning.  flecainide (TAMBOCOR) 100 mg tablet Take 100 mg by mouth daily.  duloxetine (CYMBALTA) 60 mg capsule Take 120 mg by mouth daily.       cyclobenzaprine (FLEXERIL) 5 mg tablet Take 1 Tab by mouth two (2) times daily as needed for Muscle Spasm(s). 14 Tab 0     Allergies   Allergen Reactions    Codeine Other (comments)     hallucinates    Lipitor [Atorvastatin] Myalgia    Pcn [Penicillins] Hives     Social History     Tobacco Use    Smoking status: Never Smoker    Smokeless tobacco: Never Used   Substance Use Topics    Alcohol use: Yes     Alcohol/week: 1.0 standard drinks     Types: 1 Glasses of wine per week     Comment: seldom      Lab Results   Component Value Date/Time    WBC 6.4 04/04/2019 03:02 AM    HGB 11.1 (L) 04/04/2019 03:02 AM    Hemoglobin (POC) 13.3 08/04/2010 09:57 AM    HCT 35.6 04/04/2019 03:02 AM    Hematocrit (POC) 39 08/04/2010 09:57 AM    PLATELET 774 89/73/3666 03:02 AM    MCV 94.4 04/04/2019 03:02 AM     Lab Results   Component Value Date/Time    GFR est non-AA 41 (L) 04/06/2019 05:56 AM    GFRNA, POC 55 (L) 08/04/2010 09:57 AM    GFR est AA 50 (L) 04/06/2019 05:56 AM    GFRAA, POC >60 08/04/2010 09:57 AM    Creatinine 1.31 (H) 04/06/2019 05:56 AM    Creatinine (POC) 1.1 08/04/2010 09:57 AM    BUN 27 (H) 04/06/2019 05:56 AM    BUN (POC) 17 08/04/2010 09:57 AM    Sodium 138 04/06/2019 05:56 AM    Sodium (POC) 141 08/04/2010 09:57 AM    Potassium 3.7 04/06/2019 05:56 AM    Potassium (POC) 4.1 08/04/2010 09:57 AM    Chloride 105 04/06/2019 05:56 AM    Chloride (POC) 106 08/04/2010 09:57 AM    CO2 26 04/06/2019 05:56 AM        ROS    Physical Exam   Constitutional: She appears well-developed and well-nourished. Appears stated age, morbidly obese,nad   Cardiovascular: Normal rate, regular rhythm and normal heart sounds. Exam reveals no gallop and no friction rub. No murmur heard. Pulmonary/Chest: Effort normal and breath sounds normal. No respiratory distress. She has no wheezes. Abdominal: Soft. Bowel sounds are normal.   Musculoskeletal: She exhibits edema. 2-3 plus LLE edema with pitting  No calf tenderness   Neurological: She is alert. Psychiatric: She has a normal mood and affect.    Nursing note and vitals reviewed. ASSESSMENT and PLAN  Diagnoses and all orders for this visit:    1. Leg edema, left  -     REFERRAL TO LYMPHEDEMA CLINIC  -     DUPLEX LOWER EXT VENOUS LEFT; Future   Increase lasix to 40 mg every day x 1 week then 20 mg every day   Elevate leg   Her size would make in difficult to wear support stockings -BMI 59    2. Colon cancer screening  -     OCCULT BLOOD IMMUNOASSAY,DIAGNOSTIC    3. Encounter for immunization  -     PNEUMOCOCCAL POLYSACCHARIDE VACCINE, 23-VALENT, ADULT OR IMMUNOSUPPRESSED PT DOSE,  -     ZOSTER VACC RECOMBINANT ADJUVANTED    Other orders  -     diclofenac (VOLTAREN) 1 % gel; Apply  to affected area four (4) times daily. Follow-up and Dispositions    · Return in about 6 months (around 2/14/2020) for cpe x 30 minutes.

## 2019-08-14 NOTE — PATIENT INSTRUCTIONS
Office Policies    Phone calls/patient messages:            Please allow up to 24 hours for someone in the office to contact you about your call or message. Be mindful your provider may be out of the office or your message may require further review. We encourage you to use [employer] for your messages as this is a faster, more efficient way to communicate with our office                         Medication Refills:            Prescription medications require 48-72 business hours to process. We encourage you to use [employer] for your refills. For controlled medications: Please allow 72 business hours to process. Certain medications may require you to  a written prescription at our office. NO narcotic/controlled medications will be prescribed after 4pm Monday through Friday or on weekends              Form/Paperwork Completion:            Please note a $25 fee may incur for all paperwork for completed by our providers. We ask that you allow 7-10 business days. Pre-payment is due prior to picking up/faxing the completed form. You may also download your forms to [employer] to have your doctor print off.

## 2019-08-14 NOTE — PROGRESS NOTES
Chief Complaint   Patient presents with    Ankle swelling     left knee    Medication Refill     Diclofenac gel

## 2019-08-16 ENCOUNTER — HOSPITAL ENCOUNTER (OUTPATIENT)
Dept: VASCULAR SURGERY | Age: 59
Discharge: HOME | End: 2019-08-16
Attending: INTERNAL MEDICINE
Payer: MEDICARE

## 2019-08-16 DIAGNOSIS — R60.0 LEG EDEMA, LEFT: ICD-10-CM

## 2019-08-16 PROCEDURE — 93971 EXTREMITY STUDY: CPT

## 2019-08-16 NOTE — PROGRESS NOTES
Called, spoke to pt. Two identifiers confirmed. Notified pt of lab results/recommendations per Dr. Ramos Larkin. Pt verbalized understanding of information discussed w/ no further questions at this time.

## 2019-08-16 NOTE — PROGRESS NOTES
Tell pt no blood clot is seen on dopplers today.  Elevate legs, ask if she has an appt for lymphedema clinic

## 2019-08-22 LAB — HEMOCCULT STL QL IA: NEGATIVE

## 2019-08-30 RX ORDER — FUROSEMIDE 20 MG/1
TABLET ORAL
Qty: 90 TAB | Refills: 0 | Status: SHIPPED | OUTPATIENT
Start: 2019-08-30 | End: 2019-12-01 | Stop reason: SDUPTHER

## 2019-10-01 ENCOUNTER — HOSPITAL ENCOUNTER (OUTPATIENT)
Dept: PHYSICAL THERAPY | Age: 59
Discharge: HOME | End: 2019-10-01
Payer: COMMERCIAL

## 2019-10-01 VITALS — WEIGHT: 293 LBS | BODY MASS INDEX: 43.4 KG/M2 | HEIGHT: 69 IN

## 2019-10-01 PROCEDURE — 97110 THERAPEUTIC EXERCISES: CPT

## 2019-10-01 PROCEDURE — 97162 PT EVAL MOD COMPLEX 30 MIN: CPT

## 2019-10-01 PROCEDURE — 97140 MANUAL THERAPY 1/> REGIONS: CPT

## 2019-10-01 NOTE — PROGRESS NOTES
[address] [place] [address]  [facility] Therapy [facility]  [address] [facility], [address], [place] [address].    INITIAL EVALUATION    NAME: Luke Willett AGE: 62 y.o. GENDER: female  DATE: 10/1/2019  REFERRING PHYSICIAN: Dr. Magaly Barahona:   Primary Diagnosis:  · Lymphedema, not elsewhere classified (Bilateral Lower Extremity) (I89.0)   Other Treatment Diagnoses:  · Other abnormalities of gait and mobility (R26.89)  · Swelling not relieved by elevation (R60.9)  · History of cellulitis (Z97.2)  · Morbid obesity duet o excess calories (E66.01)  · Supraventricular tachycardia (I47.1)  Date of Onset: 8/14/19  Present Symptoms and Functional Limitations: Patient reports swelling developed after her hospital stay for pneumonia and left leg cellulitis in April and May of 2019. She reports the swelling decreases to an extent overnight and it increases as the day progresses. Patient states that it can be painful at timesl. She reports no family history of leg swelling. She reports she did slip in the ER in April and then when she was admitted had some pain in the hip region. She went to rehab after the hospital admission with a total admission time of 3 weeks. She reports some falls prior to that hospital admission as well. Patient reports she requires assistance to don and doff shoes and socks and she is unable to reach her feet otherwise she is able to perform all of her self care activities. Mobility is an issue for her and she was using a cane prior to the April Hospital admission but she needed a walker when she left rehab and she primarily uses a walker while she is outside of her house. [address] [place] [address] Lymphedema Assessment Scale: Score of 13 out of 20.   Past Medical History:   Past Medical History:   Diagnosis Date    Cholelithiasis     Hypertension     KEISHA (obstructive sleep apnea)     Uses CPAP    PSVT (paroxysmal supraventricular tachycardia) (HCC)     Paroxysmal Supraventricular Tachycardia    Psychiatric disorder     Depression    Unspecified adverse effect of anesthesia May 2006    Postoperative hypoxemia, treated w/ O2 & IV Diuretics    Arthritis - knees and back  Irritable bowel syndrome     Past Surgical History:   Procedure Laterality Date    DILATION AND CURETTAGE      HX  SECTION      HX CHOLECYSTECTOMY      HX CYST INCISION AND DRAINAGE Right 2014    HX DILATION AND CURETTAGE  May 2006    HX GYN  2008    Uterine Ablation    HX TONSIL AND ADENOIDECTOMY      REMOVAL GALLBLADDER       Current Medications:    Current Outpatient Medications   Medication Sig    verapamil ER (CALAN-SR) 240 mg CR tablet TAKE 1 TABLET BY MOUTH EVERY DAY    furosemide (LASIX) 20 mg tablet TAKE 1 TABLET BY MOUTH EVERY DAY                gabapentin (NEURONTIN) 400 mg capsule Take 400 mg by mouth three (3) times daily.  oxybutynin chloride XL (DITROPAN XL) 10 mg CR tablet Take 10 mg by mouth daily.  rosuvastatin (CRESTOR) 5 mg tablet TAKE 1 TABLET BY MOUTH DAILY          suvorexant (BELSOMRA) 20 mg tablet Take 20 mg by mouth nightly.  buPROPion XL (WELLBUTRIN XL) 300 mg XL tablet Take 300 mg by mouth every morning.  flecainide (TAMBOCOR) 100 mg tablet Take 100 mg by mouth daily.  duloxetine (CYMBALTA) 60 mg capsule  Dovan  160 Take 120 mg by mouth daily. No current facility-administered medications for this encounter. Allergies: Allergies   Allergen Reactions    Codeine Other (comments)     hallucinates    Lipitor [Atorvastatin] Myalgia    Pcn [Penicillins] Hives      Prior Level of Function/Social/Work History/Personal factors and/or comorbidities impacting plan of care: Patient reports she is disabled.  to her  for 28 years with one daughter and 2 grandchildren. Grandson and daughter live with them.      Living Situation: Resides in private residence      Shannon Caregiver?: Yes,  Bal Castrejon is present today   Self-care/ADLs: Modified independent     Mobility: Modified independent   Sleeping Arrangement:  In bed   Adaptive Equipment Owned: Rollator, tripod cane, reacher, step stool, grab bars in shower, shower chair, recently modified shower to have it be one level with no step to step into the shower. Previous Therapy:  None noted for lymphedema. Patient had a Rehab stay in April-May after hospitalization to improve strength and mobility  Compression/Lymphedema Equipment:  None noted    SUBJECTIVE:  My leg didn't really start swelling until I got out of the hospital.  My foot was a lot more swollen at first but it isn't as bad now. It doesn't hurt all the time but it can be painful some days- usually a couple of days a week. Patients goals for therapy: To help to reduce leg pain    OBJECTIVE DATA SUMMARY:   EXAMINATION/PRESENTATION/DECISION MAKING:   Pain:  Patient does not complain of pain currently but she reports pain occurs a couple of days out of the week, often late in the day and is rated as 5/10 numeric scale at the highest.  She describes the pain as tightness when it is present.   Pain Scale 1: Numeric (0 - 10)     Pain Intensity 1: 0     Pain Location 1: Leg     Pain Orientation 1: Left, Right     Patient Stated Pain Goal: 0       Self-Care and ADLs:  Grooming: Independent  Bathing: Modified independent showers with shower chair   UB Dressing: Modified independent  LB Dressing: Modified independent to Minimum assistance depending on day and activity being performed   Don/Doff Shoes/Socks: dependent Toileting: Modified independent           Skin and Tissue Assessment:  Dermal Status:  [x]   Intact [x]  Dry   []  Tenuous [] Flaky   []  Wound/lesion present []  Scars   []  Dermatitis    Texture/Consistency:  []  Boggy []  Pitting Edema   [x]  Brawny- left calf and distal lower leg, minimally brawny in left medial thigh  []  Combination   []  Fibrotic/Woody Pigmentation/Color Change:  []  Normal []  Hemosiderin   []  Red []  Erythematous   [x]  Hyperpigmented []  Hyperlipodermatosclerosis   Anomalies:  []  Lymphorrhea []  Vesicles   []  Petechiae []  Warty Vercusis   []  Bullae []  Papilloma   Circulatory:  []  JOSE []  Varicosities:   [x]  Pulses- pedal pulses palpable bilateral lower extremities [x]  Vascular studies ruled out DVT in past 8/2019   []  DVT History    Nails:  []   Normal  [x]   Fungus  Stemmers Sign: positive left   Height:  Height: 5' 9\" (175.3 cm)  Weight:  Weight: (!) 185.3 kg (408 lb 9.6 oz)   BMI:  BMI (calculated): 60.3  (36 or greater: adversely affecting lymphedema)  Wound/Ulcer:  N/A      Wound Pain:   N/A  Volumetric Measurements:   Right:  21,838.07mL Left:  26,851.82mL   % Difference: 22.96% left larger than right Dominance: Right hand dominant    (See scanned graph)  Foot swelling is currently minimal in bilateral lower extremities. Right leg is significantly smaller than the left, but swelling does appear to be present in the calf but to a much lesser amount. Thigh swelling is measurable on the left leg. Range of Motion: Limited by body habitus and swelling in the joints of the lower extremity with the left leg having greater limitations at the knee and ankle than the right. Strength: Decreased at bilateral hips for flexion 3-/5 numeric scale.   Grossly 3+ to 4-/5 for bilateral knee and ankle motions  Sensation:    [x] Intact  -bilateral feet and legs  [x] Impaired- right hand with intermittent numbness and reported spasms in the hand and fingers  Mobility:    Bed/Chair Mobility:  Modified independent  Transfers:  Modified independent, patient uses a stool to get into her bed   Sitting Balance:  good Standing Balance:  Fair+   Gait:  Primarily ambulates modified independent with rollator walker in the community, she ambulated with tripod cane in the clinic with decreased foot clearance, decreased twin and decreased step length and increased time Wheelchair Mobility:  N/A    Endurance:  fair Stairs:  Step to pattern to do 3 steps outside her house, uses raling and cane and ascends or descends sideways        Safety:  Patient is alert and oriented:  yes   Safety awareness:  good   Fall Risk?:  Moderate, patient has a history of falls that extend back to prior to her April 2019 hospital admission, mobility is limited and patient requires and assistive device for ambulation   Patient given written fall prevention handout: Yes   Precautions:  Standard lymphedema precautions to include avoiding blood pressure readings, injections and IVs or other procedures/acts that could lead to broken skin on affected area, and avoiding excessive heat, resistive activity or altitude without compression garment      Based on the above components, the patient evaluation is determined to be of the following complexity level: MEDIUM    Evaluation Time: 0830--0915 45 minutes    TREATMENT PROVIDED:   1. Treatment description:  Therapeutic Exercise and Procedure: Patient instructed in activity restrictions and exercise guidelines. Therapist demonstrated deep abdominal breathing and a remedial lymphedema range of motion exercise program to be done in sitting. Patient was able to complete the routine times 5 reps and deep abdominal breathing with fair performance. Modified under thigh clap to be a marching exercise. Patient had some difficulty with toe exercises. Patient has been asked to complete breathing exercises and the decongestive exercise routine daily. Discussed the benefits of walking and encouraged patient to gradually increase her walking tolerance to 30 minutes a day. Discussed the benefits of getting up and walking around every 45 minutes to an hour as patient tends to be sedentary. Explained the benefits of the muscle pump on lymphatic fluid movement. Treatment time:  0329-6885  Minutes: 15    2.   Treatment description:  Manual Therapy: Patient instructed in skin care principles and anatomy of the lymphatic system. Therapist able to demonstrate manual lymphatic drainage techniques for the drainage of the lower legs and skin care protocol: using low pH lotion. Educated patient in the benefits of skin care and how skin integrity issues can increase her risk for cellulitis. Patient's  uses Gold Bond lotion for diabetics which has the recommended lotion qualities and contains dimethicone. Reviewed with patient the signs and symptoms of cellulitis and to contact her medical doctor or go to the ER if symptoms are present. Explained to her and her  why there is a higher risk for infection with the presence of lymphedema. Discussed treatment components and goals and the difference between compression garments (management) and multi-layer compression bandaging (treatment and reduction of swelling). Patient is interested in pursuing intensive treatment with multi-layer bandaging. Educated her in the availability of day and night compression garments, custom compression garments that can be made to fit her and flat-knit compression for optimal swelling control versus circular knit that can be purchased in the pharmacy. Educated patient in the need to purchase bandaging supplies and discussed estimated cost and that bandaging is washable and reusable. Patient was instructed that she will need to purchase a pair of sneakers 2 sizes larger than her current shoe size to accommodate her bandaging. Discussed starting with bandaging to the knee with possible progression to the left thigh or per patient questions the right lower leg. Asked patient to try to lie in the bed for about 30 minutes during the day (midday to eliminate gravity and optimize lymphatic fluid movement.)  Provided patient with a brochure on the Flexitouch and discussed the benefits of having a vasopneumatic device in the home.   Patient expressed interest in investigating this product. Patient will initiate treatment when she returns to the clinic in late October, November. Treatment time:  6704-4359   Minutes: 30    ASSESSMENT:   Saloni Dorsey is a 62 y.o. female who presents with lower extremity stage 2 lymphedema with the left leg significantly larger than the right. Swelling is of unknown origin and may be secondary to obesity as patient does not note any family history. Pain is present when swelling is increased. Patient is unable to reach her feet or don shoes and socks so she will require the assistance of her family during the intensive phase of treatment. Mobility is also decreased and treatment with multi-layer bandaging will need to be progressed slowly to determine patient's tolerance and to ensure safe mobility during her treatment. Her condition is complicated by HTN, sleep apnea, paroxysmal supraventricular tachycardia, morbid obesity with BMI over 60 and arthritis. Patient will benefit from complete decongestive therapy (CDT) including manual lymphatic drainage (MLD) technique, short-stretch textile bandages/compression system to decongest limb, and kinesiotaping as appropriate. Patient will receive instruction in proper skin care to recognize signs/symptoms of and prevent infection, therapeutic exercise, and self-MLD for independent home program and restorative lymphatic performance. This care is medically necessary due to the infection risk with lymphedema and to improve functional activities. CDT is necessary to resolve swelling to allow patient to return to wearing normal clothes/footwear and prevent worsening of symptoms, such as venous stasis ulcerations, infections, or hospitalizations. Patient will be independent with home program strategies to allow improved ADL ability and mobility and to allow patient to return to greatest functional independence. Rehabilitation potential is considered to be good.   Factors which may influence rehabilitation potential include good family support and patient's medical status and mobility limitations. Patient will benefit from 10-14 physical therapy visits over 10-12 weeks to optimize improvement in these areas. PLAN OF CARE:   Recommendations and Planned Interventions:  Manual lymph drainage/complete decongestive therapy  Multi-layer compression bandaging (short-stretch)  Compression garment fitting/provision  Lymphedema therapeutic exercise  AROM/PROM/Strength/Coordination  Self-care training  Functional mobility training  Education in skin care and lymphedema precautions  Self-MLD education per home program  Self-bandaging education per home program  Caregiver education as needed  Wound care as needed     GOALS  Short term goals  Time frame: 11/20/19  1. Patient will demonstrate knowledge of signs/symptoms of infections/cellulitis and be independent in skin care to prevent cellulitis. 2.  Patient will demonstrate independence in lymphedema home program of therapeutic exercises to improve circulation and decongest limb to improve ADLs. 3.  Patient will tolerate multi-layer bandages (MLB) and show measureable decrease in limb volume to allow ordering of home compression system (daytime, nighttime garments and pump as needed). Long term goals  Time frame: 12/27/19  1. Pt will show improvement in the [address] Lymphedema Assessment Scale by decreasing the score to 10 and thus allow improvement in patient's quality of life. 2.  Patient will be independent with don/doff of compression system and use in order to prevent reaccumulation of fluid at discharge. 3.  Pt will be independent in self-MLD and show stable limb volumes showing decongestion and pt. ready for transition to independent restorative phase of lymphedema therapy. Patient has participated in goal setting and agrees to work toward plan of care.   Patient was instructed to call if questions or concerns arise.    Thank you for this referral.   MICHEL Nixon, JAIRO-IFTIKHAR   Time Calculation: 90 mins    TREATMENT PLAN EFFECTIVE DATES:   10/1/2019 TO 12/27/19  I have read the above plan of care for Saloni Dorsey. I certify the above prescribed services are required by this patient and are medically necessary.   The above plan of care has been developed in conjunction with the lymphedema/physical therapist.       Physician Signature: ____________________________________Date:______________                                      Dr. Damaris Pagan

## 2019-11-08 ENCOUNTER — CLINICAL SUPPORT (OUTPATIENT)
Dept: INTERNAL MEDICINE CLINIC | Age: 59
End: 2019-11-08

## 2019-11-08 VITALS — TEMPERATURE: 97.8 F

## 2019-11-08 DIAGNOSIS — Z23 ENCOUNTER FOR IMMUNIZATION: Primary | ICD-10-CM

## 2019-11-08 NOTE — PROGRESS NOTES
After obtaining consent, and per orders of , injection of Shingrix right Deltoidgiven by Narciso Bojorquez LPN. Patient instructed to remain in clinic for 20 minutes afterwards, and to report any adverse reaction to me immediately.

## 2019-11-11 ENCOUNTER — HOSPITAL ENCOUNTER (OUTPATIENT)
Dept: PHYSICAL THERAPY | Age: 59
Discharge: HOME | End: 2019-11-11
Payer: COMMERCIAL

## 2019-11-11 PROCEDURE — 97140 MANUAL THERAPY 1/> REGIONS: CPT

## 2019-11-11 NOTE — PROGRESS NOTES
[facility]  [facility]  [address] [facility], [address]    LYMPHEDEMA THERAPY  VISIT: 2    []                  Daily note               [x]                 30 day Reassessment/Progress note    NAME: Niki Salazar  DATE: 11/11/2019    GOALS  Short term goals  Time frame: 11/20/19  1. Patient will demonstrate knowledge of signs/symptoms of infections/cellulitis and be independent in skin care to prevent cellulitis. Continued education in skin care and prevention of cellulitis. Patient has history of cellulitis. Information on recommended skin care products were given and reviewed. 2.  Patient will demonstrate independence in lymphedema home program of therapeutic exercises to improve circulation and decongest limb to improve ADLs. Patient working on the basic lymphedema remedial exercises in the home. Today able to demonstrate good range of motion below the knee today during bandage application. 3.  Patient will tolerate multi-layer bandages (MLB) and show measureable decrease in limb volume to allow ordering of home compression system (daytime, nighttime garments and pump as needed). Patient received her first MLB to the L LE today. Applied initial bandage for patient and  patient's  to see how it will be applied, then reapplied bandage that she left the clinic in and her  was able to record on his phone. Patient comfortable in the bandage and is aware of how to maintain and when to remove if needed.      Long term goals  Time frame: 12/27/19  1. Pt will show improvement in the [facility] Lymphedema Assessment Scale by decreasing the score to 10 and thus allow improvement in patient's quality of life. 2.  Patient will be independent with don/doff of compression system and use in order to prevent reaccumulation of fluid at discharge.   3.  Pt will be independent in self-MLD and show stable limb volumes showing decongestion and pt. ready for transition to independent restorative phase of lymphedema therapy. Basic education in Self MLD with skin care was demonstrated today. Will take full volumes when she returns on her next visit to see how she responded to [facility]. SUBJECTIVE REPORT: Patient arrived today with her  to begin intensive CDT treatments. Pain:0/10  Gait:  Level of assistance:  Modified independent  Assistive device: rollator  ADLs: Modified Independent for most. Needs assistance with activities below the knee. Treatment Response:  Patient reviewed packet received at evaluation. Patient completed home program as prescribed. Function: Patient requiring less assistance with completion of home program  [facility] Lymphedema Assessment Scale: Deferred   Weight: 394.0 lbs (weight at evaluation was 408.6 lbs)  TREATMENT AND OBJECTIVE DATA SUMMARY:   Patient/Family Education:      Educated in skin care: Reviewed skin care principles   Skin care products  Hygiene  Prevention of cellulitis   Educated in exercise: Walking program  ROM routine   Instructed in self MLD: Demonstration and instruction of Self MLD with skin care done today during MLD portion of the session. Instructed in don/doff of compression system: Multi-layer bandage (MLB) donning principles and wear precautions. Further instruction in bandaging application needed     Therapeutic Activity 0 minutes   Treatment time: 0  Functional Mobility: Modified Lea    Fall risk: moderate     Therapeutic Exercise/Procedure 0 minutes   Treatment time:   Maru ball exercise program: Deferred today. Free exercises/ROM: Patient working on modified active range of motion routine in the home. Today able to demonstrate good range of motion below the knee with bandaging education and application that required many knee flexion and extension reps along with ankle pumps.      Home program: Patient to perform daily to BID:  Skin care  Deep abdominal breathing  Exercise routine  Walking program  Rest in supine  Compression bandage  Self manual lymph draining (MLD)  Bring supplies to each therapy visit  Purchase necessary supplies  Weight loss program-Issued patient the recommended foods list for lymphedema    Rationale: Exercise will increase the lymph angiomotoricity and tissue pressure of the skin and thus decrease swelling. Modalities 0 minutes   Treatment time: 0  Vasopneumatic pump: Deferred today. Will look into options for a vaso-pneumatic pump for home use as this would be beneficial for home use to manage her swelling. Manual Lymphatic Drainage (MLD) 95 minutes   Treatment time: 8:05am-9:40am   Area to decongest: LE: Right, Left and Trunk     Sequence used and effectiveness: Secondary sequence for lower extremities with trunk involvement   Continued education in MLD principles with skin care. Skin/wound care/debridement: Dermal Status:  [x]   Intact [x]  Dry  -L heal   []  Tenuous [] Flaky    []  Wound/lesion present []  Scars   []  Dermatitis     Texture/Consistency:  []  Boggy []  Pitting Edema   [x]  Brawny- left calf and distal lower leg, minimally brawny in left medial thigh  []  Combination   []  Fibrotic/Woody     Pigmentation/Color Change:  []  Normal []  Hemosiderin   []  Red []  Erythematous   [x]  Hyperpigmented []  Hyperlipodermatosclerosis   Anomalies:  []  Lymphorrhea []  Vesicles   []  Petechiae []  Warty Vercusis   []  Bullae []  Papilloma   Circulatory:  []  JOSE []  Varicosities:   [x]  Pulses- pedal pulses palpable bilateral lower extremities [x]  Vascular studies ruled out DVT in past 8/2019   []  DVT History     Nails:  []   Normal  [x]   Fungus    L LE was cleansed with Dove Soap and Eucrin, Flexitol, and Antifungal foot spray all applied today. Patient was given handout on the products and what is recommended for use in the home.     Applied multi-layer compression bandaging to: LEFT lower extremity: below knee    The following multi-layer bandages were applied:  Tricofix      Padding layer:  Cellona  Fleece 15 cm    Foam:  12 cm roll    Short stretch bandages:   6 cm  8 cm  10 cm    Educated patient in multi-layer bandaging donning principles and precautions. Applied bandage twice so that patient's  could begin learning the process and he also recorded application. They were also given written handouts for reference. Will have patient wear clinic applied MLB to the L LE until next visit as tolerated. Bandage list was started. Upper/Lower extremity compression: Began MLB today see above. Kinesiotaping: Deferred    Girth/Volume measurement: Reviewed results of volumetric measurements taken on evaluation. Will reassess volumes when patient returns after she has maintained MLB to the L LE.        ASSESSMENT:   Treatment effectiveness and tolerance: Patient able to return today for the first treatment post evaluation and ready and eager to begin full treatment. Focused today on skin care and MLB. Patient's  present and able to see how bandage is applied and recorded for home use as well. Will have patient maintain MLB to the L LE as tolerated until next visit and then have her  practice applying the bandage on next visit so that we can assess his technique. Expect patient will do very well as she is motivated and has good family support. Patient and  aware to contact the clinic if they have any questions before follow up visit on Thursday. Progress toward goals: See goal section for details. PLAN OF CARE:   Changes to the plan of care: Continue with plan of care established at evaluation. Patient received her first  MLB to the L LE today. Frequency: 2 times a week   Other: Bandage list started today.       TOTAL TREATMENT 95 mins     Prabhakar Jha, PTA, CLT     CARYL MoralesT, KASANDRA

## 2019-11-14 ENCOUNTER — HOSPITAL ENCOUNTER (OUTPATIENT)
Dept: PHYSICAL THERAPY | Age: 59
Discharge: HOME | End: 2019-11-14
Payer: COMMERCIAL

## 2019-11-14 PROCEDURE — 97110 THERAPEUTIC EXERCISES: CPT

## 2019-11-14 PROCEDURE — 97140 MANUAL THERAPY 1/> REGIONS: CPT

## 2019-11-14 NOTE — PROGRESS NOTES
----- Message from Radhika Tabor sent at 9/17/2019  6:57 PM CDT -----  Pt called wanting to speak with someone in the Dr office to advise her as to what she should do. Pt stated she's had a migraine for the past week and she feel really bad. Pt also stated she's getting concerned this is the worst migraine she's had. Pt asked for a call back.    Pt contact # [phone].      Thanks        [address] [place] [address]  [facility]hed[facility]  [address] [facility], [address], [facility] 22.    LYMPHEDEMA THERAPY  VISIT: 3    [x]                  Daily note               []                 30 day Reassessment/Progress note    NAME: Basim Hill  DATE: 11/14/2019    GOALS  Short term goals  Time frame: 11/20/19  1. Patient will demonstrate knowledge of signs/symptoms of infections/cellulitis and be independent in skin care to prevent cellulitis. Continued education in skin care and prevention of cellulitis. Patient has history of cellulitis. Information on recommended skin care products were given and reviewed. 2.  Patient will demonstrate independence in lymphedema home program of therapeutic exercises to improve circulation and decongest limb to improve ADLs. Patient working on the basic lymphedema remedial exercises in the home. Today able to demonstrate good range of motion below the knee today during bandage application. 3.  Patient will tolerate multi-layer bandages (MLB) and show measureable decrease in limb volume to allow ordering of home compression system (daytime, nighttime garments and pump as needed). Patient tolerated her first MLB to the L LE. Patient's  was to learn MLB application today with hands on practice, but he was not feeling well so this was deferred today. Also they were 20 minutes for appointment today. Applied MLB today again to the L LE and they are to work on applying MLB in the home using the recording they made last visit and hand out provided and return to clinic on Monday with MLB that was applied in the home to assess how they are managing. Will set up a vaso-pneumatic pump trial for the home on an upcoming visit.       Long term goals  Time frame: 12/27/19  1.   Pt will show improvement in the [facility] [place] [address] Lymphedema Assessment Scale by decreasing the score to 10 and thus allow improvement in patient's quality of life. Deferred   2. Patient will be independent with don/doff of compression system and use in order to prevent reaccumulation of fluid at discharge. Deferred   3. Pt will be independent in self-MLD and show stable limb volumes showing decongestion and pt. ready for transition to independent restorative phase of lymphedema therapy. Basic education in Self MLD with skin care was demonstrated today. Full volumes of the L LE taken today to reveal that patient has lost 5723 ml on the L LE since evaluation on 10/1/19. L LE 21,128.81 ml today compared to 26,851.82 ml on evaluation 10/01/19  Will continue to monitor. SUBJECTIVE REPORT: Patient arrived 20 minutes late for session today. 's back was bothering him so he was not able to participate in hands on bandaging as planned. Pain:0/10  Gait:  Level of assistance:  Modified independent  Assistive device: rollator  ADLs: Modified Independent for most. Needs assistance with activities below the knee. Treatment Response:  Patient reviewed packet received at evaluation. Patient completed home program as prescribed. Patient was able to maintain MLB to the L LE. Function: Patient requiring less assistance with completion of home program  [facility] 57 Lymphedema Assessment Scale: Deferred   Weight: 394.0 lbs (weight at evaluation was 408.6 lbs)  TREATMENT AND OBJECTIVE DATA SUMMARY:   Patient/Family Education:      Educated in skin care: Reviewed skin care principles   Skin care products  Hygiene  Prevention of cellulitis   Educated in exercise: Walking program  ROM routine   Instructed in self MLD: Demonstration and instruction of Self MLD with skin care done today during MLD portion of the session. Instructed in don/doff of compression system: Multi-layer bandage (MLB) donning principles and wear precautions.  Further instruction in bandaging application needed     Therapeutic Activity 0 minutes   Treatment time: 0  Functional Mobility: Modified J[place]    Fall risk: moderate     Therapeutic Exercise/Procedure 15 minutes   Treatment time: 10:45am-11:00am  [employer] exercise program: Deferred today. Free exercises/ROM: Patient working on modified active range of motion routine in the home. Today able to demonstrate good range of motion below the knee with bandaging education and application that required many knee flexion and extension reps along with ankle pumps. Home program: Patient to perform daily to BID:  Skin care  Deep abdominal breathing  Exercise routine  Walking program  Rest in supine  Compression bandage  Self manual lymph draining (MLD)  Bring supplies to each therapy visit  Purchase necessary supplies  Weight loss program-Issued patient the recommended foods list for lymphedema    Rationale: Exercise will increase the lymph angiomotoricity and tissue pressure of the skin and thus decrease swelling. Modalities 0 minutes   Treatment time: 0  Vasopneumatic pump: Deferred today. Will look into options for a vaso-pneumatic pump for home use as this would be beneficial for home use to manage her swelling. Manual Lymphatic Drainage (MLD) 55 minutes   Treatment time: 9:50am-10:45am  Area to decongest: LE: Right, Left and Trunk     Sequence used and effectiveness: Secondary sequence for lower extremities with trunk involvement   Continued education in MLD principles with skin care.     Skin/wound care/debridement: Dermal Status:  [x]   Intact [x]  Dry  -L heal (improved with treatment last visit)   []  Tenuous [] Flaky    []  Wound/lesion present []  Scars   []  Dermatitis     Texture/Consistency:  []  Boggy []  Pitting Edema   [x]  Brawny- left calf and distal lower leg, minimally brawny in left medial thigh  []  Combination   []  Fibrotic/Woody     Pigmentation/Color Change:  []  Normal []  Hemosiderin   []  Red []  Erythematous   [x]  Hyperpigmented []  Hyperlipodermatosclerosis   Anomalies:  []  Lymphorrhea [] Vesicles   []  Petechiae []  Warty Vercusis   []  Bullae []  Papilloma   Circulatory:  []  JOSE []  Varicosities:   [x]  Pulses- pedal pulses palpable bilateral lower extremities [x]  Vascular studies ruled out DVT in past 8/2019   []  DVT History     Nails:  []   Normal  [x]   Fungus    L LE was cleansed with Dove Soap and Eucrin, Flexitol, and Antifungal foot spray all applied today. Patient was given handout on the products and what is recommended for use in the home. Applied multi-layer compression bandaging to: LEFT lower extremity: below knee    The following multi-layer bandages were applied:  Tricofix      Padding layer:  Cellona  Fleece 15 cm    Foam:  12 cm roll    Short stretch bandages:   6 cm  8 cm  10 cm    Educated patient in multi-layer bandaging donning principles and precautions. Patient's  was able to record  application last visit. They were also given written handouts for reference. Today he was to apply MLB in the clinic to assess his techniques. They arrived late and he was not feeling well so this was not able to be done. They will work together on Sunday to reapply a bandage and if not able or if the bandage does not feel like it is applied well, patient aware to remove and return to the clinic with the supplies. Patient reports that her daughter will also be assisting this weekend so she feels that they can do it. Upper/Lower extremity compression: Continued with  MLB today see above. Kinesiotaping: Deferred    Girth/Volume measurement: Full volumes of the L LE taken today to reveal that patient has lost 5723 ml on the L LE since evaluation on 10/1/19. L LE 21,128.81 ml today compared to 26,851.82 ml on evaluation 10/01/19     ASSESSMENT:   Treatment effectiveness and tolerance: Patient tolerating MLB to the L LE well with good results today. Patient and her  will work together before her next visit on reapplying MLB to the L LE in the home so it can be assessed. Patient's  is supportive, but will need to see if he is going to be able to apply an adequate bandage in the home setting. Patient to return Monday for follow up. Progress toward goals: See goal section for details. PLAN OF CARE:   Changes to the plan of care: Continue with plan of care established at evaluation. Frequency: 2 times a week   Other: Bandage list started. Melissa Obando       TOTAL TREATMENT 70 mins     Prabhakar Jha, PTA, CLT

## 2019-11-16 VITALS — WEIGHT: 293 LBS | BODY MASS INDEX: 43.4 KG/M2 | HEIGHT: 69 IN

## 2019-11-18 ENCOUNTER — HOSPITAL ENCOUNTER (OUTPATIENT)
Dept: PHYSICAL THERAPY | Age: 59
Discharge: HOME | End: 2019-11-18
Payer: COMMERCIAL

## 2019-11-18 NOTE — PROGRESS NOTES
[address] [place] [address]  Physical Therapy Lymphedema Clinic  [address] [place] [facility], [address], [facility] 22.    Lymphedema Therapy      11/18/2019:  Sarah Mcgarry was not seen on this date for physical therapy for the following reasons:    [x]      Patient called to cancel the visit for the following reasons: Patient's  called 10 minutes before scheduled appointment to report that patient was getting ready for her appointment this am and he reports that her \"back went out\". Patient has another appointment scheduled this week on Thursday so encouraged him to contact the clinic if she was not feeling better and able to attend. []      Patient missed the visit and did not call to cancel.     Prabhakar Jha, PTA, CLT

## 2019-11-20 ENCOUNTER — TELEPHONE (OUTPATIENT)
Dept: INTERNAL MEDICINE CLINIC | Age: 59
End: 2019-11-20

## 2019-11-20 NOTE — TELEPHONE ENCOUNTER
Caller's first and last name: Talonnehemias galvan [facility]   Reason for call: She wanted to know did the practice receive the fax for Flexitouch for PT.  Please call and verify   Callback required yes/no and why: yes   Best contact number(s): [phone] or fax [phone]   Details to clarify the request: n/a   [facility]

## 2019-11-21 ENCOUNTER — HOSPITAL ENCOUNTER (OUTPATIENT)
Dept: PHYSICAL THERAPY | Age: 59
Discharge: HOME | End: 2019-11-21
Payer: COMMERCIAL

## 2019-11-21 VITALS — HEIGHT: 69 IN | WEIGHT: 293 LBS | BODY MASS INDEX: 43.4 KG/M2

## 2019-11-21 PROCEDURE — 97140 MANUAL THERAPY 1/> REGIONS: CPT

## 2019-11-23 ENCOUNTER — TELEPHONE (OUTPATIENT)
Dept: INTERNAL MEDICINE CLINIC | Age: 59
End: 2019-11-23

## 2019-11-23 NOTE — TELEPHONE ENCOUNTER
----- Message from Maureen Caldwell sent at 11/22/2019  4:41 PM EST -----  Regarding: Johan Espinosa MD/Telephone  General Message/Vendor Calls    Caller's first and last name:  Rita Palacios [employer]     Reason for call: Rita Palacios is requesting Dr. Erin Lang signature at the bottoms of the prescription form for \"Flexi Touch\".       Callback required yes/no and why: Yes       Best contact number(s): [phone]/Fax:[phone]      Details to clarify the request: n/a

## 2019-11-25 ENCOUNTER — HOSPITAL ENCOUNTER (OUTPATIENT)
Dept: PHYSICAL THERAPY | Age: 59
Discharge: HOME | End: 2019-11-25
Payer: COMMERCIAL

## 2019-11-25 PROCEDURE — 97016 VASOPNEUMATIC DEVICE THERAPY: CPT

## 2019-11-25 PROCEDURE — 97140 MANUAL THERAPY 1/> REGIONS: CPT

## 2019-11-25 NOTE — PROGRESS NOTES
[facility]  Physical Therapy Lymphedema Clinic  [address] [facility], [address] [place] [address], [facility] 22.    LYMPHEDEMA THERAPY  VISIT: 4    [x]                  Daily note               []                 30 day Reassessment/Progress note    NAME: Fletcher Adams  DATE: 11/25/2019    GOALS  Short term goals  Time frame: 11/20/19  1. Patient will demonstrate knowledge of signs/symptoms of infections/cellulitis and be independent in skin care to prevent cellulitis. Continued education in skin care and prevention of cellulitis. Patient has history of cellulitis. Information on recommended skin care products were given and reviewed. Extend goal as this is just patient's 4th visit. 2.  Patient will demonstrate independence in lymphedema home program of therapeutic exercises to improve circulation and decongest limb to improve ADLs. Patient working on the basic lymphedema remedial exercises in the home. Today able to demonstrate good range of motion below the knee today during bandage application. Extend goal as this is just patient's 4th visit. 3.  Patient will tolerate multi-layer bandages (MLB) and show measureable decrease in limb volume to allow ordering of home compression system (daytime, nighttime garments and pump as needed). Patient arrived not bandaged. Patient had to cancel her appointment earlier in the week secondary to back issues and she was unable to get to her appointment. .Patient's  has learned aspects of MLB, but has not had hands on practice. Today they were late again for her visit and time did not allow for this to occur. Patient is concerned about traveling next month and would like to proceed with picking garments as it does not appear that they are going to be able to be consistent with bandaging.   Applied MLB today again to the L LE and they are to work on applying MLB in the home using the recording they made last visit and hand out provided and return to clinic on Monday with MLB that was applied in the home to assess how they are managing if able. Will set up a vaso-pneumatic pump trial for the home on an upcoming visit. Information sent to vendor regarding the types of garments that the patient would like to order which is velcro lower leg garments and compression capris. Will measure for these on an upcoming visit, but sent request as to begin the authorization process. Educated patient on the process and that she may not have them approved and received prior to her trip in mid December. Extend goal as this is just patient's 4th visit.      Long term goals  Time frame: 12/27/19  1. Pt will show improvement in the [facility] Lymphedema Assessment Scale by decreasing the score to 10 and thus allow improvement in patient's quality of life. Deferred   2. Patient will be independent with don/doff of compression system and use in order to prevent reaccumulation of fluid at discharge. Deferred   3. Pt will be independent in self-MLD and show stable limb volumes showing decongestion and pt. ready for transition to independent restorative phase of lymphedema therapy. Basic education in Self MLD continued today. Full volumes of the L LE taken today to reveal that patient has gained 564 ml on the L LE since last visit 11/14/19. Patient has lost a total of 5159 ml on the L LE and 3917 ml on the R LE since evaluation on 10/1/19. L LE 21,692.54 ml today compared to 26,851.82 ml on evaluation 10/01/19  R LE 17,921.18 ml today compared to 21,838.07 ml on evaluation 10/01/19       SUBJECTIVE REPORT: Patient arrived late for session again today. Patient arrived without MLB in place, but had all of her supplies with her. It does not appear that bandaging in the home setting is going to work well for her. Patient concerned about a trip she has in December, so proceeded with contacting the vendor today to start authorization process for her garments. Will measure for these on an upcoming visit. Pain:0/10  Gait:  Level of assistance:  Modified independent  Assistive device: rollator  ADLs: Modified Independent for most. Needs assistance with activities below the knee. Treatment Response: Patient was not able to stay in [facility] as hoped as she missed visit on Monday due to back issues and arrived today without MLB as her  was not able to don bandage. Function: Patient requiring less assistance with completion of home program  [facility] Lymphedema Assessment Scale: Deferred   Weight: 424 lbs up from last visit weight of 394.0 lbs (weight at evaluation was 408.6 lbs)  TREATMENT AND OBJECTIVE DATA SUMMARY:   Patient/Family Education:      Educated in skin care: Reviewed skin care principles   Skin care products  Hygiene  Prevention of cellulitis   Educated in exercise: Walking program  ROM routine   Instructed in self MLD: Demonstration and instruction of Self MLD with skin care done today during MLD portion of the session. Instructed in don/doff of compression system: Multi-layer bandage (MLB) donning principles and wear precautions. Further instruction in bandaging application needed     Therapeutic Activity 0 minutes   Treatment time: 0  Functional Mobility: Modified [place]    Fall risk: moderate     Therapeutic Exercise/Procedure 0 minutes   Treatment time: 0  Maru ball exercise program: Deferred today. Free exercises/ROM: Patient working on modified active range of motion routine in the home. Today able to demonstrate good range of motion below the knee with bandaging education and application that required many knee flexion and extension reps along with ankle pumps.      Home program: Patient to perform daily to BID:  Skin care  Deep abdominal breathing  Exercise routine  Walking program  Rest in supine  Compression bandage  Self manual lymph draining (MLD)  Bring supplies to each therapy visit  Purchase necessary supplies  Weight loss program-Issued patient the recommended foods list for lymphedema on previous visits. Rationale: Exercise will increase the lymph angiomotoricity and tissue pressure of the skin and thus decrease swelling. Modalities 0 minutes   Treatment time: 0  Vasopneumatic pump: Will look into options for a vaso-pneumatic pump for home use as this would be beneficial for home use to manage her swelling. Will set up a pump trial on an upcoming visit. Manual Lymphatic Drainage (MLD) 60 minutes   Treatment time: 8:35 am-9:35am  Area to decongest: LE: Right, Left and Trunk     Sequence used and effectiveness: Secondary sequence for lower extremities with trunk involvement   Continued education in MLD principles with skin care. Skin/wound care/debridement: Dermal Status:  [x]   Intact [x]  Dry -L heal (improved with treatment last visit) They have purchased recommended products. []  Tenuous [] Flaky    []  Wound/lesion present []  Scars   []  Dermatitis     Texture/Consistency:  []  Boggy []  Pitting Edema   [x]  Brawny- left calf and distal lower leg, minimally brawny in left medial thigh  []  Combination   []  Fibrotic/Woody     Pigmentation/Color Change:  []  Normal []  Hemosiderin   []  Red []  Erythematous   [x]  Hyperpigmented []  Hyperlipodermatosclerosis   Anomalies:  []  Lymphorrhea []  Vesicles   []  Petechiae []  Warty Vercusis   []  Bullae []  Papilloma   Circulatory:  []  JOSE []  Varicosities:   [x]  Pulses- pedal pulses palpable bilateral lower extremities [x]  Vascular studies ruled out DVT in past 8/2019   []  DVT History     Nails:  []   Normal  [x]   Fungus  (Nails need to be trimmed by podiatrist. Marquis Spain information on how to arrange an appointment to get this done.)    L LE was cleansed with Dove Soap and Eucrin, Flexitol, and Antifungal foot spray all applied today. Patient has purchased  the products that were  recommended for use in the home.     Applied multi-layer compression bandaging to: LEFT lower extremity: below knee    The following multi-layer bandages were applied:  Tricofix      Padding layer:  Cellona  Fleece 15 cm    Foam:  12 cm roll    Short stretch bandages:   6 cm  8 cm  10 cm    Educated patient in multi-layer bandaging donning principles and precautions. Patient's  was able to record  application last visit. They were also given written handouts for reference. To date she has not arrived in [facility] that her  has applied. Unsure if he is going to be capable of applying MLB in the home setting. Encouraged them to try over the weekend before returning to the clinic. Upper/Lower extremity compression: Continued with  MLB today see above. Patient eager to proceed with ordering her garments as she is going on a trip in December. Discussed the authorization process so this was started. Patient decided on velcro lower leg garments and compression capris. Will measure for these on an upcoming visit as patient needs to decongest more while awaiting the authorization to be approved. Kinesiotaping: Deferred    Girth/Volume measurement: Full volumes of the L LE taken today to reveal that patient has gained 564 ml on the L LE since last visit 11/14/19. Patient has lost a total of 5159 ml on the L LE and 3917 ml on the R LE since evaluation on 10/1/19. L LE 21,692.54 ml today compared to 26,851.82 ml on evaluation 10/01/19  R LE 17,921.18 ml today compared to 21,838.07 ml on evaluation 10/01/19. ASSESSMENT:   Treatment effectiveness and tolerance: Patient unfortunately was not able to come to her appointment on Monday due to back issues. Today she arrives with her L LE not bandaged and was very late for her appointment. Patient's  was unable to don a bandage in the home setting as hoped.  Patient will continue to work on her home program. She is concerned about an upcoming trip in December so she wanted to start the garment ordering process today. Patient selected velcro garments for lower legs and compression susy pants to wear with them. This information was sent to vendor to get authorization and we will measure for these products on an upcoming visit. Will set up a vaso-pneumatic pump trial for future appointment as this was to be done on the visit she cancelled. Patient will continue to work on her home program and return on Monday to the clinic for follow up. Progress toward goals: See goal section for details. Short term goals extended. PLAN OF CARE:   Changes to the plan of care: Continue with plan of care established at evaluation. Frequency: 2 times a week   Other: Bandage list started. [facility] contacted to start authorization for garments.        TOTAL TREATMENT 60 mins     Prabhakar URIOSTEGUI Patch, PTA, CLT

## 2019-11-25 NOTE — PROGRESS NOTES
[facility]  Physical Therapy Lymphedema Clinic  [address] [facility], [address]    LYMPHEDEMA THERAPY  VISIT: 5    [x]                  Daily note               []                 30 day Reassessment/Progress note    NAME: Lo Flores  DATE: 11/25/2019    GOALS  Short term goals  Time frame: 11/20/19 (extend all to 12/27/19)  1. Patient will demonstrate knowledge of signs/symptoms of infections/cellulitis and be independent in skin care to prevent cellulitis. Continued education in skin care and prevention of cellulitis. Patient has history of cellulitis. Information on recommended skin care products were given and reviewed. 2.  Patient will demonstrate independence in lymphedema home program of therapeutic exercises to improve circulation and decongest limb to improve ADLs. Patient working on the basic lymphedema remedial exercises in the home. Today able to demonstrate good range of motion below the knee today during bandage application. 3.  Patient will tolerate multi-layer bandages (MLB) and show measureable decrease in limb volume to allow ordering of home compression system (daytime, nighttime garments and pump as needed). Patient's  has learned aspects of MLB, but has not attempted MLB to date on his own. Patient arrived today with MLB that had slid down and they did not remove it and reapply. Concerned that follow through with MLB in the home setting is not going to be consistent so we try to measure for the velcro products soon if they are not able to bandage in the home. Information sent to vendor regarding the types of garments that the patient would like to order which is velcro lower leg garments and compression capris. sent request as to begin the authorization process last week so will send in measurements when appropriate.   Educated patient on the process and that she may not have them approved and received prior to her trip in mid December.      Long term goals  Time frame: 12/27/19  1. Pt will show improvement in the [facility] Lymphedema Assessment Scale by decreasing the score to 10 and thus allow improvement in patient's quality of life. Deferred   2. Patient will be independent with don/doff of compression system and use in order to prevent reaccumulation of fluid at discharge. Deferred   3. Pt will be independent in self-MLD and show stable limb volumes showing decongestion and pt. ready for transition to independent restorative phase of lymphedema therapy. Patient encouraged to to work on Self MLD in the home. Girths taken for pre and post Flexitouch Plus demonstration/ treatment. SUBJECTIVE REPORT: Arrived with bandage in place from last visit and it was loss and and slid down. Patient's  has not been able to demonstrate that he can don an adequate bandage. Encouraged patient to have other family members selected to help with this like her daughter in order to be successful in reduction. Pain:0/10  Gait:  Level of assistance:  Modified independent  Assistive device: rollator  ADLs: Modified Independent for most. Needs assistance with activities below the knee. Treatment Response:   Function: Patient requiring less assistance with completion of home program  [facility] Lymphedema Assessment Scale: Deferred   Weight: 427 lbs with clothing, coat and bandage on. TREATMENT AND OBJECTIVE DATA SUMMARY:   Patient/Family Education:      Educated in skin care: Reviewed skin care principles   Skin care products  Hygiene  Prevention of cellulitis   Educated in exercise: Walking program  ROM routine   Instructed in self MLD: Demonstration and instruction of Self MLD with skin care done today during MLD portion of the session. Instructed in don/doff of compression system: Multi-layer bandage (MLB) donning principles and wear precautions.  Further instruction in bandaging application needed     Therapeutic Activity 0 minutes   Treatment time: 0  Functional Mobility: Modified [place]    Fall risk: moderate     Therapeutic Exercise/Procedure 0 minutes   Treatment time: 0  Maru ball exercise program: Deferred today. Free exercises/ROM: Patient working on modified active range of motion routine in the home. Today able to demonstrate good range of motion below the knee with bandaging education and application that required many knee flexion and extension reps along with ankle pumps. Home program: Patient to perform daily to BID:  Skin care  Deep abdominal breathing  Exercise routine  Walking program  Rest in supine  Compression bandage  Self manual lymph draining (MLD)  Bring supplies to each therapy visit  Purchase necessary supplies  Weight loss program-Issued patient the recommended foods list for lymphedema on previous visits. Rationale: Exercise will increase the lymph angiomotoricity and tissue pressure of the skin and thus decrease swelling. Modalities 40 minutes   Treatment time: 8:30am-9:10am  Vasopneumatic pump: Patient was able to have a demonstration/treatment of the Flexitouch Plus today for trunk and R LE. Patient tolerated the treatment well and was able to have improvement in 7 of the 8 girths that were taken pre and post. The only one that did not improve was on her foot, which is not significantly swollen. See measurement section for details. Patient would benefit from this device for home use to use daily during the restorative phase of care. [employer] able to answer patient's questions and will submit request for device. Manual Lymphatic Drainage (MLD)  55 minutes   Treatment time: 0:93RH-1:42WJ and 9:05am-9:35am   Area to decongest: LE: Right, Left and Trunk   Sequence used and effectiveness: Secondary sequence for lower extremities with trunk involvement   Continued education in MLD principles with skin care.     Skin/wound care/debridement: Dermal Status:  [x]   Intact [x]  Dry -L heal improved with use of Flexitol Heel Balm    []  Tenuous [] Flaky    []  Wound/lesion present []  Scars   []  Dermatitis     Texture/Consistency:  []  Boggy []  Pitting Edema   [x]  Brawny- left calf and distal lower leg, minimally brawny in left medial thigh  []  Combination   []  Fibrotic/Woody     Pigmentation/Color Change:  []  Normal []  Hemosiderin   []  Red []  Erythematous   [x]  Hyperpigmented []  Hyperlipodermatosclerosis   Anomalies:  []  Lymphorrhea []  Vesicles   []  Petechiae []  Warty Vercusis   []  Bullae []  Papilloma   Circulatory:  []  JOSE []  Varicosities:   [x]  Pulses- pedal pulses palpable bilateral lower extremities [x]  Vascular studies ruled out DVT in past 8/2019   []  DVT History     Nails:  []   Normal  [x]   Fungus  (Nails need to be trimmed by podiatrist. Donald Gisela information on how to arrange an appointment to get this done.)    L LE was cleansed with Dove Soap and Eucrin, Flexitol, and Antifungal foot spray all applied today. Patient has purchased  the products that were  recommended for use in the home. Applied multi-layer compression bandaging to: LEFT lower extremity: below knee    The following multi-layer bandages were applied:  Tricofix      Padding layer:  Cellona  Fleece 15 cm    Foam:  12 cm roll /Komprex II at the upper calf. Short stretch bandages:   6 cm  8 cm  10 cm    Educated patient in multi-layer bandaging donning principles and precautions. Patient's  was able to record  application on previous visit. They were also given written handouts for reference. To date she has not arrived in [facility] that her  has applied. Unsure if he is going to be capable of applying MLB in the home setting at this point. Patient encouraged to find more family members that can assist if needed. Upper/Lower extremity compression: Continued with  MLB today see above.   Patient eager to proceed with ordering her garments as she is going on a trip in December. Discussed the authorization process so this was started last week. Patient decided on velcro lower leg garments and compression capris. Will measure for these on an upcoming visit as patient needs to decongest more while awaiting the authorization to be approved. Kinesiotaping: Deferred    Girth/Volume measurement: Affected Side: B LEs  (pre and post Flexitouch Plus measurements)     Left (cm) Right (cm)   5th Tuberosity 22.8    22.4  22.4   Ankle 29.2    28.3  26.3   Calf 69.8    59.5  55.8   Mid Knee      68.5  68   Thigh      90.5  88.2   Groin      89.2  88.8   Hips       168   165     Waist      168  165           ASSESSMENT:   Treatment effectiveness and tolerance: Patient came in today for follow up and had not removed her bandage from Friday that had loosened. Patient has not been able to get assist from her  to reapply bandage in the home setting. Will need to focus on consistency with bandaging in the home setting over the next couple of visits to be successful at reduction vs proceeding with ordering velcro adjustable garments. Patient selected velcro garments for lower legs and compression susy pants to wear with them on previous visit and we will proceed with sending in measurements when authorization has been approved. Today patient was able to have a vaso-pneumatic pump trial in the clinic which was successful and patient would like to pursue getting a pump for home use. Patient to work on maintaining MLB that was applied today in the clinic and return on Wednesday for follow up. Progress toward goals: See goal section for details. Short term goals extended. PLAN OF CARE:   Changes to the plan of care: Continue with plan of care established at evaluation. Frequency: 2 times a week   Other: Bandage list started. [facility] contacted to start authorization for garments.    [facility] able to begin process of getting vaso-pneumatic pump for patient for home use.         TOTAL TREATMENT 90 mins     Prabhakar C Patch, PTA, CLT

## 2019-11-27 ENCOUNTER — HOSPITAL ENCOUNTER (OUTPATIENT)
Dept: PHYSICAL THERAPY | Age: 59
Discharge: HOME | End: 2019-11-27
Payer: COMMERCIAL

## 2019-11-27 PROCEDURE — 97140 MANUAL THERAPY 1/> REGIONS: CPT

## 2019-11-27 NOTE — PROGRESS NOTES
[facility]  Physical Therapy Lymphedema Clinic  [address] [facility], [address], [facility] 22.    LYMPHEDEMA THERAPY  VISIT: 6    [x]                  Daily note               []                 30 day Reassessment/Progress note    NAME: Glo Griffin  DATE: 11/27/2019    GOALS  Short term goals  Time frame: 11/20/19  1. Patient will demonstrate knowledge of signs/symptoms of infections/cellulitis and be independent in skin care to prevent cellulitis. Continued education in skin care and prevention of cellulitis. Patient has history of cellulitis. Information on recommended skin care products were given and reviewed. 2.  Patient will demonstrate independence in lymphedema home program of therapeutic exercises to improve circulation and decongest limb to improve ADLs. Patient working on the basic lymphedema remedial exercises in the home. Today able to demonstrate good range of motion below the knee today during bandage application. 3.  Patient will tolerate multi-layer bandages (MLB) and show measureable decrease in limb volume to allow ordering of home compression system (daytime, nighttime garments and pump as needed). Patient arrived with out MLB in place today. Patient's  has learned aspects of MLB, but has not attempted MLB to date on his own to date. Concerned that follow through with MLB in the home setting is not going to be consistent so will measure for the velcro products soon if they are not able to bandage in the home. Information sent to vendor regarding the types of garments that the patient would like to order which is velcro lower leg garments and compression capris. They are working on authorization process and we  will send in measurements when appropriate. Educated patient on the process and that she may not have them approved and received prior to her trip in mid December.      Long term goals  Time frame: 12/27/19  1.   Pt will show improvement in the [facility] Lymphedema Assessment Scale by decreasing the score to 10 and thus allow improvement in patient's quality of life. Deferred   2. Patient will be independent with don/doff of compression system and use in order to prevent reaccumulation of fluid at discharge. Deferred   3. Pt will be independent in self-MLD and show stable limb volumes showing decongestion and pt. ready for transition to independent restorative phase of lymphedema therapy. Patient encouraged to to work on Self MLD in the home. Girths taken for pre and post Flexitouch Plus demonstration/ treatment. SUBJECTIVE REPORT: Patient arrived without MLB in place. \"The bandage came down and I just took it off. We washed everything, but did not put it back on. \" Discussed the importance of maintaining MLB to the L LE as recommended for best results. Patient reports that over the weekend she will have her family assist her with reapplying as her  has not been able to try. Time did not allow for him to have hands on practice today as she was 30 minutes late for her scheduled appointment. Pain:Soreness in her back she related to the weather today. Gait:  Level of assistance:  Modified independent  Assistive device: rollator  ADLs: Modified Independent for most. Needs assistance with activities below the knee. Treatment Response:   Function: Patient requiring less assistance with completion of home program  [facility] Lymphedema Assessment Scale: Deferred   Weight: Deferred  TREATMENT AND OBJECTIVE DATA SUMMARY:   Patient/Family Education:      Educated in skin care: Reviewed skin care principles   Skin care products  Hygiene  Prevention of cellulitis   Educated in exercise: Walking program  ROM routine   Instructed in self MLD: Demonstration and instruction of Self MLD with skin care done today during MLD portion of the session.    Instructed in don/doff of compression system: Multi-layer bandage (MLB) donning principles and wear precautions. Further instruction in bandaging application needed     Therapeutic Activity 0 minutes   Treatment time: 0  Functional Mobility: Modified [place]    Fall risk: moderate     Therapeutic Exercise/Procedure 0 minutes   Treatment time: 0  Maru ball exercise program: Deferred today. Free exercises/ROM: Patient working on modified active range of motion routine in the home. Today able to demonstrate good range of motion below the knee with bandaging education and application that required many knee flexion and extension reps along with ankle pumps. Home program: Patient to perform daily to BID:  Skin care  Deep abdominal breathing  Exercise routine  Walking program  Rest in supine  Compression bandage  Self manual lymph draining (MLD)  Bring supplies to each therapy visit-purchased her bandage supplies today from [employer]. Purchase necessary supplies  Weight loss program-Issued patient the recommended foods list for lymphedema on previous visits. Rationale: Exercise will increase the lymph angiomotoricity and tissue pressure of the skin and thus decrease swelling. Modalities 0 minutes   Treatment time: 0  Vasopneumatic pump: Information submitted last visit to start the process to see if patient can get a Flexitouch Plus device for home use. Manual Lymphatic Drainage (MLD)  65 minutes   Treatment time:8:30am-9:35am  Area to decongest: LE: Right, Left and Trunk   Sequence used and effectiveness: Secondary sequence for lower extremities with trunk involvement   Continued education in MLD principles with skin care.     Skin/wound care/debridement: Dermal Status:  [x]   Intact [x]  Dry -L heal improved with use of Flexitol Heel Balm    []  Tenuous [] Flaky    []  Wound/lesion present []  Scars   []  Dermatitis     Texture/Consistency:  []  Boggy []  Pitting Edema   [x]  Brawny- left calf and distal lower leg, minimally brawny in left medial thigh  []  Combination   []  Fibrotic/Woody     Pigmentation/Color Change:  []  Normal []  Hemosiderin   []  Red []  Erythematous   [x]  Hyperpigmented []  Hyperlipodermatosclerosis   Anomalies:  []  Lymphorrhea []  Vesicles   []  Petechiae []  Warty Vercusis   []  Bullae []  Papilloma   Circulatory:  []  JOSE []  Varicosities:   [x]  Pulses- pedal pulses palpable bilateral lower extremities [x]  Vascular studies ruled out DVT in past 8/2019   []  DVT History     Nails:  []   Normal  [x]   Fungus  (Nails need to be trimmed by podiatrist. Adele Carrasco information on how to arrange an appointment to get this done.)    FABY JIMENEZ was cleansed with Nina Bouquet and Eucrin. Patient has purchased the products that were recommended for use in the home. Applied multi-layer compression bandaging to: LEFT lower extremity: below knee    The following multi-layer bandages were applied:  Tricofix      Padding layer:  Cellona  Fleece 15 cm    Foam:  12 cm roll     Short stretch bandages:   6 cm  8 cm  10 cm   12cm (added this visit)  Comperm to cover entire bandage. Educated patient in multi-layer bandaging donning principles and precautions. Patient's  was able to record  application on previous visit and today recorded the process again from cleansing leg to application of MLB. They were also given written handouts for reference. To date she has not arrived in [facility] that her  has applied. Unsure if he is going to be capable of applying MLB in the home setting at this point. Patient encouraged to find more family members that can assist if needed and she plans to do this over the weekend and return on Monday with MLB that her family has applied. She is aware to remove if not comfortable. Upper/Lower extremity compression: Continued with  MLB today see above. Patient eager to proceed with ordering her garments as she is going on a trip in December.  Discussed the authorization process so this was started last week. Patient decided on velcro lower leg garments and compression capris. Will measure for these on an upcoming visit as patient needs to decongest more while awaiting the authorization to be approved. Able to bring in samples to start education on the products for them as they are requiring increased time for processing most of the CD[employer] concepts to date. Kinesiotaping: Deferred    Girth/Volume measurement: Affected Side: B LEs     Left (cm) Right (cm)   Ankle     30.4 cm  24.4 cm      Calf     69.7 cm     54.5 cm     Patient did not arrive bandaged so girths taken and also trying to assess if she will be able to fit into ready made [employer] vs Custom. Will need to reassess on Monday and calf girth on L LE is too large for ready made and length of lower legs is right at 36 cm where it may be too tall. ASSESSMENT:   Treatment effectiveness and tolerance: Patient continues to be inconsistent with maintaining her MLB. Due to her significant swelling fluctuations are expected and she needs to have someone in the home setting that can bandage in between sessions at this point. Patient will work with her family on reapplying MLB for Monday appointment and will have  work on bandaging in the clinic. Patient is chronically late for appointments, so time needed for this has been very limited. Will send vendor measurements for garments once appropriate or it is deemed that they can not provide adequate bandage. Patient aware to contact the clinic with any issues prior to next visit. Progress toward goals: See goal section for details. Short term goals extended. PLAN OF CARE:   Changes to the plan of care: Continue with plan of care established at evaluation. Frequency: 2 times a week   Other: Bandages purchased from [employer]. [employer] working on authorization for garments. [employer] processing vaso-pneumatic pump order for patient.       TOTAL TREATMENT 65 mins     Prabhakar URIOSTEGUI Patch, PTA, CLT

## 2019-12-02 ENCOUNTER — HOSPITAL ENCOUNTER (OUTPATIENT)
Dept: PHYSICAL THERAPY | Age: 59
Discharge: HOME | End: 2019-12-02
Payer: COMMERCIAL

## 2019-12-02 PROCEDURE — 97140 MANUAL THERAPY 1/> REGIONS: CPT

## 2019-12-02 RX ORDER — FUROSEMIDE 20 MG/1
TABLET ORAL
Qty: 90 TAB | Refills: 0 | Status: SHIPPED | OUTPATIENT
Start: 2019-12-02 | End: 2020-03-02

## 2019-12-02 NOTE — PROGRESS NOTES
[facility]  Physical Therapy Lymphedema Clinic  [address] [facility], [address] [address].    LYMPHEDEMA THERAPY  VISIT: 7    []                  Daily note               [x]                 30 day Reassessment/Progress note    NAME: Dorcas Barrett  DATE: 12/2/2019    GOALS  Short term goals  Time frame: 11/20/19 (All extended to 12/27/19)  1. Patient will demonstrate knowledge of signs/symptoms of infections/cellulitis and be independent in skin care to prevent cellulitis. Patient educated in skin care and prevention of cellulitis. Patient has history of cellulitis. Patient has purchased and is using  recommended skin care products and her skin integrity has improved. Patient independent with skin care at this time. Goal Met 12/2/19  2. Patient will demonstrate independence in lymphedema home program of therapeutic exercises to improve circulation and decongest limb to improve ADLs. Patient working on the basic lymphedema remedial exercises in the home. Time is limited in sessions as she is often late for her appointments. Will continue to monitor this goal to make sure that she is independent with modified lymphedema exercise routines. 3.  Patient will tolerate multi-layer bandages (MLB) and show measureable decrease in limb volume to allow ordering of home compression system (daytime, nighttime garments and pump as needed). Patient arrived with MLB in place today that her family (daughter and sister) assisted her with donning by watching the recording of MLB application done here in the clinic. Overall it was adequate, but needed to be more secure and additional padding and the ankle for better shaping. Patient's  has learned aspects of MLB, but has not attempted MLB to date on his own to date.  Patient reports that she will have her sister come in next visit to learn more techniques as she will be able to best assist her along with patient's daughter. Concerned that follow through with MLB in the home setting is not going to be consistent so began measuring for the velcro products in the clinic. Patient's measurements are placing her in custom as she is falling outside the reference girths and lengths for ready made products. Will assess once more before submitting order on next visit. Information sent to vendor previously regarding the types of garments that the patient would like to order which is velcro lower leg garments and compression capris. They are working on authorization process and we will send in measurements when appropriate. Educated patient on the process and that she may not have them approved and received prior to her trip in mid December and she will need to continue to bandage.       Long term goals  Time frame: 12/27/19  1. Pt will show improvement in the [facility] Lymphedema Assessment Scale by decreasing the score to 10 and thus allow improvement in patient's quality of life. Deferred   2. Patient will be independent with don/doff of compression system and use in order to prevent reaccumulation of fluid at discharge. Deferred   3. Pt will be independent in self-MLD and show stable limb volumes showing decongestion and pt. ready for transition to independent restorative phase of lymphedema therapy. Patient encouraged  to work on Self MLD in the home. Full volumes taken today to reveal that patient has lost 1185 ml on the L LE and 190 ml on the R LE since last volumes were taken on 11/21/19. Patient has lost a total of 6344 ml on the L LE and 4107 ml on the R LE since evaluation on 10/1/19. (500 ml= 1 pound)  Patient is very close to stable volumes and will proceed with ordering her compression garments over the next couple of visits. L LE 20,507.40 ml today compared to 26,851.82 ml on 10/1/19  R LE 21,838.07 ml today compared to 21,838.07 ml on 10/1/19  Will continue to monitor.         SUBJECTIVE REPORT: Patient arrived in [place] on L LE that her family had donned. Patient reported that her daughter and sister were able to assist her with the videos that her  had recorded on previous visits. Patient's volumes are stabilizing so started the measurement process for her garments, but because she was late will have to continue to measure on next visit to make sure that all forms are completed to submit to vendor. Pain:0/10   Gait:  Level of assistance:  Modified independent  Assistive device: rollator  ADLs: Modified Independent for most. Needs assistance with activities below the knee. Treatment Response:   Function: Patient requiring less assistance with completion of home program  [facility] Lymphedema Assessment Scale: Deferred   Weight: 420.4 lbs (down from 424.0 lbs on 11/21/19) Evaluation weight was 408 lbs 9.6 oz  TREATMENT AND OBJECTIVE DATA SUMMARY:   Patient/Family Education:      Educated in skin care: Reviewed skin care principles   Skin care products  Hygiene  Prevention of cellulitis   Educated in exercise: Walking program  ROM routine   Instructed in self MLD: Demonstration and instruction of Self MLD with skin care done today during MLD portion of the session. Instructed in don/doff of compression system: Multi-layer bandage (MLB) donning principles and wear precautions. Further instruction in bandaging application needed. Patient's daughter and sister have assisted since last visit and patient reports her sister will try to come in on next visit to learn to improve her techniques. Patient will need to continue to bandage until her compression garments arrive. Therapeutic Activity 0 minutes   Treatment time: 0  Functional Mobility: Modified [place]    Fall risk: moderate     Therapeutic Exercise/Procedure 0 minutes   Treatment time: 0  Maru ball exercise program: Deferred today.    Free exercises/ROM: Patient working on modified active range of motion routine in the home. Today able to demonstrate good range of motion below the knee with bandaging education and application that required many knee flexion and extension reps along with ankle pumps. Home program: Patient to perform daily to BID:  Skin care  Deep abdominal breathing  Exercise routine  Walking program  Rest in supine  Compression bandage  Self manual lymph draining (MLD)  Bring supplies to each therapy visit  Purchase necessary supplies  Weight loss program-Issued patient the recommended foods list for lymphedema on previous visits. Rationale: Exercise will increase the lymph angiomotoricity and tissue pressure of the skin and thus decrease swelling. Modalities 0 minutes   Treatment time: 0  Vasopneumatic pump: Information submitted to assist patient with trying to obtain   a Flexitouch Plus device for home use. Manual Lymphatic Drainage (MLD)  70 minutes   Treatment time:8:20am-9:30am  Area to decongest: LE: Right, Left and Trunk   Sequence used and effectiveness: Secondary sequence for lower extremities with trunk involvement   Continued education in MLD principles with skin care.     Skin/wound care/debridement: Dermal Status:  [x]   Intact [x]  Dry -L heal improved with use of Flexitol Heel Balm    []  Tenuous [] Flaky    []  Wound/lesion present []  Scars   []  Dermatitis     Texture/Consistency:  []  Boggy []  Pitting Edema   [x]  Brawny- left calf and distal lower leg, minimally brawny in left medial thigh  []  Combination   []  Fibrotic/Woody     Pigmentation/Color Change:  []  Normal []  Hemosiderin   []  Red []  Erythematous   [x]  Hyperpigmented []  Hyperlipodermatosclerosis   Anomalies:  []  Lymphorrhea []  Vesicles   []  Petechiae []  Warty Vercusis   []  Bullae []  Papilloma   Circulatory:  []  JOSE []  Varicosities:   [x]  Pulses- pedal pulses palpable bilateral lower extremities [x]  Vascular studies ruled out DVT in past 8/2019   []  DVT History     Nails:  []   Normal  [x]   Fungus (Nails need to be trimmed by podiatrist. Herve Abo information on how to arrange an appointment to get this done.)    B LE was cleansed with Dove Soap and Eucrin applied using MLD principles. Patient has purchased the products that were recommended for use in the home. Applied multi-layer compression bandaging to: LEFT lower extremity: below knee    The following multi-layer bandages were applied:  Tricofix      Padding layer:  Cellona  Fleece 15 cm    Foam:  12 cm roll     Short stretch bandages:   6 cm  8 cm  10 cm   12cm   Comperm to cover entire bandage. Educated patient in multi-layer bandaging donning principles and precautions. Patient's  was able to record  application on previous visit and last visit he recorded the process again from cleansing leg to application of MLB. They were also given written handouts for reference. Since her  has not been able to assist with MLB patient had her daughter and sister assist this weekend and she arrived in [place] to the left lower leg that they had applied. It was adequate, but needed to be more secure and have more padding at the ankle. Patient reports that her sister reports that she will be able to come to patient's next visit to learn how to improve her techniques with applying MLB as she will help her on patient's trip later in December. Patient try to maintain clinic applied MLB until next visit is able. Upper/Lower extremity compression: Continued with  MLB today see above. Patient eager to proceed with ordering her garments as she is going on a trip later in December. Discussed the authorization process so this was started 11/21/19. Patient decided on velcro lower leg garments and compression capris. Started the measurement process today and will continue on next visit so garment order can be sent when ready. Patient arrived late for her session so time was limited to recheck measurements.    Educated on garments again today to improve carry over for when they arrive. Will check girths/lengths again on next visit has patient most likely will need custom [employer] products. Will  assess for [employer] garments if patient would like to proceed with night time garments as well. Kinesiotaping: Deferred    Girth/Volume measurement: Full volumes taken today to reveal that patient has lost 1185 ml on the L LE and 190 ml on the R LE since last volumes were taken on 11/21/19. Patient has lost a total of 6344 ml on the L LE and 4107 ml on the R LE since evaluation on 10/1/19. (500 ml= 1 pound)  Patient is very close to stable volumes and will proceed with ordering her compression garments over the next couple of visits. L LE 20,507.40 ml today compared to 26,851.82 ml on 10/1/19  R LE 21,838.07 ml today compared to 21,838.07 ml on 10/1/19     ASSESSMENT:   Treatment effectiveness and tolerance: Patient arrived today with her MLB on left lower leg that her family assisted her with over the weekend. Patient followed through with finding someone else to assist her as her  is not going to be able to don bandage in the home setting. Patient's daughter and sister assisted and will continue to assist her as needed per patient. Sister will come to next visit so that she can assist with patient when they are on their trip. Began the measurement process for her velcro garments today and will complete in next visit. It appears that her measurements are falling outside of standard sizing so most likely will need to have custom garments made and patient made aware of this. Patient to continue with her home program and return later this week for follow up. Progress toward goals: See goal section for details. Short term goal #1 met. All other goals continue. PLAN OF CARE:   Changes to the plan of care: Continue with plan of care established at evaluation. Frequency: 2 times a week   Other: [employer] working on authorization for garments. [employer] processing vaso-pneumatic pump order for patient.       TOTAL TREATMENT 70 mins     Prabhakar URIOSTEGUI Patch, PTA, CLT     CARYL MoralesT, KASANDRA

## 2019-12-04 VITALS — WEIGHT: 293 LBS | BODY MASS INDEX: 43.4 KG/M2 | HEIGHT: 69 IN

## 2019-12-05 ENCOUNTER — HOSPITAL ENCOUNTER (OUTPATIENT)
Dept: PHYSICAL THERAPY | Age: 59
Discharge: HOME | End: 2019-12-05
Payer: COMMERCIAL

## 2019-12-05 PROCEDURE — 97140 MANUAL THERAPY 1/> REGIONS: CPT

## 2019-12-05 NOTE — PROGRESS NOTES
[facility]  Physical Therapy Lymphedema Clinic  [address] [facility], [address] [address].    LYMPHEDEMA THERAPY  VISIT: 8    [x]                  Daily note               []                 30 day Reassessment/Progress note    NAME: Mauro Griffin  DATE: 12/5/2019    GOALS  Short term goals  Time frame: 11/20/19 (All extended to 12/27/19)  1. Patient will demonstrate knowledge of signs/symptoms of infections/cellulitis and be independent in skin care to prevent cellulitis. Patient educated in skin care and prevention of cellulitis. Patient has history of cellulitis. Patient has purchased and is using  recommended skin care products and her skin integrity has improved. Patient independent with skin care at this time. Goal Met 12/2/19  2. Patient will demonstrate independence in lymphedema home program of therapeutic exercises to improve circulation and decongest limb to improve ADLs. Patient working on the basic lymphedema remedial exercises in the home. Time is limited in sessions as she is often late for her appointments. Will continue to monitor this goal to make sure that she is independent with modified lymphedema exercise routines. 3.  Patient will tolerate multi-layer bandages (MLB) and show measureable decrease in limb volume to allow ordering of home compression system (daytime, nighttime garments and pump as needed). Patient arrived with MLB in place that was donned last visit. Patient's sister came to her visit today and she will be assisting patient with MLB with the help of patient's daughter until her garments arrive. Patient's sister was able to learn how to improve her techniques and is able to don and adequate bandage. Patient able to proceed with being measured for an ordering her custom Circaid Juxtafit lower legs garments and also Circaid Ankle Foot Wraps in small.  Ordered additional liner socks (closed toe) and also [employer] Educated patient on the process and that she may not have them approved and received prior to her trip in mid December and she will need to continue to bandage until they arrive and she understood. Vaso-pneumatic pump has been ordered and is awaiting arrival. Patient will wait for night time garments until she has worked with [employer] and her day time products consistently.     Long term goals  Time frame: 12/27/19  1. Pt will show improvement in the [facility] Lymphedema Assessment Scale by decreasing the score to 10 and thus allow improvement in patient's quality of life. Deferred   2. Patient will be independent with don/doff of compression system and use in order to prevent reaccumulation of fluid at discharge. Deferred   3. Pt will be independent in self-MLD and show stable limb volumes showing decongestion and pt. ready for transition to independent restorative phase of lymphedema therapy. Patient encouraged to to work on Self MLD in the home. Full volumes taken last visit to reveal that patient has lost 1185 ml on the L LE and 190 ml on the R LE since last volumes were taken on 11/21/19. Patient has lost a total of 6344 ml on the L LE and 4107 ml on the R LE since evaluation on 10/1/19. (500 ml= 1 pound)  L LE 20,507.40 ml compared to 26,851.82 ml on 10/1/19  R LE 21,838.07 ml compared to 21,838.07 ml on 10/1/19  Will continue to monitor. SUBJECTIVE REPORT:  Patient arrived today with MLB intact and her sister came to learn about MLB and patient's home program so that she can assist her more. Patient's  has not been able to assist as hoped and today during visit slept in the waiting room, not very motivated or capable of assisting her with MLB on a daily basis. Pain:0/10   Gait:  Level of assistance:  Modified independent  Assistive device: rollator  ADLs: Modified Independent for most. Needs assistance with activities below the knee.     Treatment Response:   Function: Patient requiring less assistance with completion of home program/Patient's sister able to assist with MLB. [facility] Lymphedema Assessment Scale: Deferred   Weight: Deferred today. TREATMENT AND OBJECTIVE DATA SUMMARY:   Patient/Family Education:      Educated in skin care: Reviewed skin care principles   Skin care products  Hygiene  Prevention of cellulitis   Educated in exercise: Walking program  ROM routine   Instructed in self MLD: Demonstration and instruction of Self MLD with skin care done today during MLD portion of the session. Instructed in don/doff of compression system: Multi-layer bandage (MLB) donning principles and wear precautions. Further instruction in bandaging application needed. Patient's sister able to come in today and learn more about improving her techniques with MLB so she can help patient maintain MLB until her garments arrive. Patient will need to continue to bandage until her compression garments arrive and she is aware of this. Therapeutic Activity 0 minutes   Treatment time: 0  Functional Mobility: Modified [place]    Fall risk: moderate     Therapeutic Exercise/Procedure 0 minutes   Treatment time: 0  Maru Rattle exercise program: Deferred today. Free exercises/ROM: Patient working on modified active range of motion routine in the home. Today able to demonstrate good range of motion below the knee with bandaging education and application that required many knee flexion and extension reps along with ankle pumps. Home program: Patient to perform daily to BID:  Skin care  Deep abdominal breathing  Exercise routine  Walking program  Rest in supine  Compression bandage  Self manual lymph draining (MLD)  Bring supplies to each therapy visit  Purchase necessary supplies  Weight loss program-Issued patient the recommended foods list for lymphedema on previous visits.     Rationale: Exercise will increase the lymph angiomotoricity and tissue pressure of the skin and thus decrease swelling. Modalities 0 minutes   Treatment time: 0  Vasopneumatic pump: Information submitted to assist patient with trying to obtain   a Flexitouch Plus device for home use. Manual Lymphatic Drainage (MLD) 85 minutes   Treatment time:8:20am-9:45am  Area to decongest: LE: Right, Left and Trunk   Sequence used and effectiveness: Secondary sequence for lower extremities with trunk involvement   Continued education in MLD principles with skin care. Skin/wound care/debridement: Dermal Status:  [x]   Intact [x]  Dry -L heal improved with use of Flexitol Heel Balm    []  Tenuous [] Flaky    []  Wound/lesion present []  Scars   []  Dermatitis     Texture/Consistency:  []  Boggy []  Pitting Edema   [x]  Brawny- left calf and distal lower leg, minimally brawny in left medial thigh  []  Combination   []  Fibrotic/Woody     Pigmentation/Color Change:  []  Normal []  Hemosiderin   []  Red []  Erythematous   [x]  Hyperpigmented []  Hyperlipodermatosclerosis   Anomalies:  []  Lymphorrhea []  Vesicles   []  Petechiae []  Warty Vercusis   []  Bullae []  Papilloma   Circulatory:  []  JOSE []  Varicosities:   [x]  Pulses- pedal pulses palpable bilateral lower extremities [x]  Vascular studies ruled out DVT in past 8/2019   []  DVT History     Nails:  []   Normal  [x]   Fungus  (Nails need to be trimmed by podiatrist. [facility] information on how to arrange an appointment to get this done.)    B LE was cleansed with Dove Soap and Eucrin applied using MLD principles. Patient has purchased the products that were recommended for use in the home. Applied multi-layer compression bandaging to: LEFT lower extremity: below knee    The following multi-layer bandages were applied:  Tricofix      Padding layer:  Cellona  Fleece 15 cm    Foam:  12 cm roll     Short stretch bandages:   6 cm  8 cm  10 cm   12cm   Comperm to cover entire bandage.   Comperm applied to the R LE and will be removed in the evenings. Educated patient in multi-layer bandaging donning principles and precautions. Patient's  was able to record  application on previous visit and last visit he recorded the process again from cleansing leg to application of MLB. They were also given written handouts for reference. Since her  has not been able to assist with MLB patient had her daughter and sister assist last weekend. Patient's sister able to come to patient's visit today and learn how to improve her techniques with applying MLB as she will help her on patient's trip later in December and in between visits. Patient try to maintain clinic applied MLB until next visit is able. Upper/Lower extremity compression: Able to proceed and order patient her Custom Circaid Juxtafit lower legs garments and also Circaid Ankle Foot Wraps in small. Ordered additional liner socks (closed toe) and also [address] Kinesiotaping: Deferred    Girth/Volume measurement: Deferred for garment measuring. ASSESSMENT:   Treatment effectiveness and tolerance: Patient able to have her sister come in today to learn how to better apply MLB so she can assist her on her trip in mid December and also until her garments arrive. Patient's sister is very supportive and will be able to assist patient during this transition. Patient was able to be measured for her velcro garments and compression capris today and order was sent to [employer] for processing. Patient now down to once a week visits and will have assist with MLB in the home until garments arrive. Patient to call clinic if she has any questions before returning next week. Progress toward goals: See goal section for details. Short term goal #1 met. All other goals continue. PLAN OF CARE:   Changes to the plan of care: Continue with plan of care established at evaluation.      Frequency: Weekly   Other: [employer]-compression garment vendor  [employer] processing vaso-pneumatic pump order for patient.       TOTAL TREATMENT 85 mins     Prabhakar URIOSTEGUI Patch, PTA, CLT

## 2019-12-12 ENCOUNTER — HOSPITAL ENCOUNTER (OUTPATIENT)
Dept: PHYSICAL THERAPY | Age: 59
Discharge: HOME | End: 2019-12-12
Payer: COMMERCIAL

## 2019-12-12 VITALS — HEIGHT: 69 IN | WEIGHT: 293 LBS | BODY MASS INDEX: 43.4 KG/M2

## 2019-12-12 PROCEDURE — 97140 MANUAL THERAPY 1/> REGIONS: CPT

## 2019-12-12 PROCEDURE — 97110 THERAPEUTIC EXERCISES: CPT

## 2019-12-12 NOTE — PROGRESS NOTES
[facility]  Physical Therapy Lymphedema Clinic  [address], [address], [facility] 22.    LYMPHEDEMA THERAPY  VISIT: 9    [x]                  Daily note               []                 30 day Reassessment/Progress note    NAME: Sarah Mcgarry  DATE: 12/12/2019    GOALS  Short term goals  Time frame: 11/20/19 (All extended to 12/27/19)  1. Patient will demonstrate knowledge of signs/symptoms of infections/cellulitis and be independent in skin care to prevent cellulitis. Patient educated in skin care and prevention of cellulitis. Patient has history of cellulitis. Patient has purchased and is using  recommended skin care products and her skin integrity has improved. Patient independent with skin care at this time. Goal Met 12/2/19  2. Patient will demonstrate independence in lymphedema home program of therapeutic exercises to improve circulation and decongest limb to improve ADLs. Patient working on the basic lymphedema remedial exercises in the home. Time is limited in sessions as she is often late for her appointments. Today able to learn Ressie Milder Routine with assist and given handouts and how to obtain a ball for home use. Will monitor this goal to make sure that she is independent with modified lymphedema exercise routines. 3.  Patient will tolerate multi-layer bandages (MLB) and show measureable decrease in limb volume to allow ordering of home compression system (daytime, nighttime garments and pump as needed). Patient arrived with MLB in place that was donned by her sister. The bandage was in place well and sister will continue to assist until her garments arrive. Patient was measured for custom Circaid Juxtafit lower legs garments and also Circaid Ankle Foot Wraps in small. Ordered additional liner socks (closed toe) and also [address]  Order being filled by [employer].  Patient received her Vaso-pneumatic pump Flexitouch Plus and has been trained in the home patient reports using it daily alternating legs each day. Patient now with all items ordered or received. Will wait for nighttime garments to be addressed in the future. Goal Met 12/12/19    Long term goals  Time frame: 12/27/19  1. Pt will show improvement in the [facility] Lymphedema Assessment Scale by decreasing the score to 10 and thus allow improvement in patient's quality of life. Deferred   2. Patient will be independent with don/doff of compression system and use in order to prevent reaccumulation of fluid at discharge. Deferred   3. Pt will be independent in self-MLD and show stable limb volumes showing decongestion and pt. ready for transition to independent restorative phase of lymphedema therapy. Patient encouraged to to work on Self MLD in the home. Full volumes taken last visit to reveal that patient has lost 1420 ml on the L LE and 236 ml on the R LE since last volumes were taken on 12/2/19. Patient has lost a total of 7764 ml on the L LE and 3889 ml on the R LE since evaluation on 10/1/19. (500 ml= 1 pound)  L LE 19,087.84 ml compared to 26,851.82 ml on 10/1/19  R LE 17,494.97 ml compared to 21,838.07 ml on 10/1/19  Will continue to monitor. SUBJECTIVE REPORT:  Patient arrived today late for her appointment. Patient had on MLB to the L LE that her sister had donned since last visit. Patient reports that she feels that her sister is doing a very good job assisting her in the home. Patient also reports that she received her Flexitouch Plus for home use and patient has been trained in use and is using it daily as instructed. Vendor contacted and garments have not been authorized so will not arrive before she travels. Discussed with patient that she will need to take her vaso-pneumatic device and also bandage for her trip and she verbalizes understanding.    Patient reports that yesterday that she received injections in bilateral knees (Dr. Nisa Pickett) at [facility]. Pain:0/10   Gait:  Level of assistance:  Modified independent  Assistive device: rollator  ADLs: Modified Independent for most. Needs assistance with activities below the knee. Treatment Response:   Function: Patient requiring less assistance with completion of home program/Patient's sister able to assist with MLB. [facility] Lymphedema Assessment Scale: Deferred   Weight: 411.8 lbs  TREATMENT AND OBJECTIVE DATA SUMMARY:   Patient/Family Education:      Educated in skin care: Reviewed skin care principles   Skin care products  Hygiene  Prevention of cellulitis   Educated in exercise: Walking program  ROM routine   Instructed in self MLD: Demonstration and instruction of Self MLD with skin care done today during MLD portion of the session. Instructed in don/doff of compression system: Multi-layer bandage (MLB) donning principles and wear precautions. Further instruction in bandaging application needed. Patient's sister able to come in today and learn more about improving her techniques with MLB so she can help patient maintain MLB until her garments arrive. Patient will need to continue to bandage until her compression garments arrive and she is aware of this. Therapeutic Activity 0 minutes   Treatment time: 0  Functional Mobility: Modified [place]    Fall risk: moderate     Therapeutic Exercise/Procedure 15 minutes   Treatment time:  915am-9:30am  Maru ball exercise program: Patient educated on the [employer] routine today with assist for ball placement patient able to perform all x 5 reps each, but omitted the initial neck range of motion per patient's report of certain neck movements can cause her to pass out and last exercise modified to marching. Patient given handout with modifications and how to purchase [employer]. Free exercises/ROM: Patient working on modified active range of motion routine in the home.  Today able to demonstrate good range of motion below the knee with bandaging education and application that required many knee flexion and extension reps along with ankle pumps. Home program: Patient to perform daily to BID:  Skin care  Deep abdominal breathing  Exercise routine  Walking program  Rest in supine  Compression bandage  Self manual lymph draining (MLD)  Bring supplies to each therapy visit  Purchase necessary supplies  Weight loss program-Issued patient the recommended foods list for lymphedema on previous visits. Rationale: Exercise will increase the lymph angiomotoricity and tissue pressure of the skin and thus decrease swelling. Modalities 0 minutes   Treatment time: 0  Vasopneumatic pump: Patient received her Flexitouch Plus device for home use and had her in home training. Patient to use as tolerated once daily alternating legs and using trunk piece. Manual Lymphatic Drainage (MLD) 55 minutes   Treatment time:8:20am-9:15am  Area to decongest: LE: Right, Left and Trunk   Sequence used and effectiveness: Secondary sequence for lower extremities with trunk involvement   Continued education in MLD principles with skin care.     Skin/wound care/debridement: Dermal Status:  [x]   Intact [x]  Dry -L heal improved with use of Flexitol Heel Balm in the home.     []  Tenuous [] Flaky    []  Wound/lesion present []  Scars   []  Dermatitis     Texture/Consistency:  []  Boggy []  Pitting Edema   []  Brawny []  Combination   []  Fibrotic/Woody     Pigmentation/Color Change:  []  Normal []  Hemosiderin   []  Red []  Erythematous   [x]  Hyperpigmented []  Hyperlipodermatosclerosis   Anomalies:  []  Lymphorrhea []  Vesicles   []  Petechiae []  Warty Vercusis   []  Bullae []  Papilloma   Circulatory:  []  JOSE []  Varicosities:   [x]  Pulses- pedal pulses palpable bilateral lower extremities [x]  Vascular studies ruled out DVT in past 8/2019   []  DVT History     Nails:  []   Normal  [x]   Fungus  (Nails need to be trimmed by podiatrist. Issued information on how to arrange an appointment to get this done.)    B LE was cleansed with [place] and Eucrin/Aquaphor applied using MLD principles. Patient has purchased the products that were recommended for use in the home. Applied multi-layer compression bandaging to: LEFT lower extremity: below knee    The following multi-layer bandages were applied:  Tricofix  (F)    Padding layer:  Cellona  Fleece 15 cm    Foam:  12 cm roll and additional 10 cm foam to build shape    Short stretch bandages:   6 cm  8 cm  10 cm   12cm   Comperm to cover entire bandage. Comperm applied to the R LE and will be removed in the evenings. Educated patient in multi-layer bandaging donning principles and precautions. Patient's  was able to record  application on previous visit and last visit he recorded the process again from cleansing leg to application of MLB. They were also given written handouts for reference. Since her  has not been able to assist with MLB patient had her daughter and sister assist last weekend. Patient's sister able to come to patient's visit today and learn how to improve her techniques with applying MLB as she will help her on patient's trip later in December and in between visits. Patient to continue to bandage until her garments arrive. Upper/Lower extremity compression: Able to proceed and order patient her Custom Circaid Juxtafit lower legs garments and also Circaid Ankle Foot Wraps in small. Ordered additional liner socks (closed toe) and also [address] Kinesiotaping: Deferred    Girth/Volume measurement: Full volumes taken last visit to reveal that patient has lost 1420 ml on the L LE and 236 ml on the R LE since last volumes were taken on 12/2/19. Patient has lost a total of 7764 ml on the L LE and 3889 ml on the R LE since evaluation on 10/1/19.   (500 ml= 1 pound)  L LE 19,087.84 ml compared to 26,851.82 ml on 10/1/19  R LE 17,494.97 ml compared to 21,838.07 ml on 10/1/19     ASSESSMENT:   Treatment effectiveness and tolerance: Patient's sister has been able to assist her with MLB and today patient returns for follow up and continues to shoe improvement. Vendor reports that garments have not been approved to date so she will not have them for her trip to [place] and cruise next week. Patient will come next week and be bandaged bilaterally below the knee before she leaves on her trip. Patient now down to once a week visits and will have assist with MLB in the home until garments arrive. Patient to call clinic if she has any questions before returning next week. Progress toward goals: See goal section for details. Short term goal 1+3 met. All other goals continue. PLAN OF CARE:   Changes to the plan of care: Continue with plan of care established at evaluation.      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

## 2019-12-19 ENCOUNTER — HOSPITAL ENCOUNTER (OUTPATIENT)
Dept: PHYSICAL THERAPY | Age: 59
Discharge: HOME | End: 2019-12-19
Payer: COMMERCIAL

## 2019-12-19 PROCEDURE — 97140 MANUAL THERAPY 1/> REGIONS: CPT

## 2019-12-19 NOTE — PROGRESS NOTES
[facility]  Physical Therapy Lymphedema Clinic  [address] [facility], [address] [place] [address].    LYMPHEDEMA THERAPY  VISIT: 10    [x]                  Daily note               []                 30 day Reassessment/Progress note    NAME: Zarina Ring  DATE: 12/19/2019    GOALS  Short term goals  Time frame: 11/20/19 (All extended to 12/27/19)  1. Patient will demonstrate knowledge of signs/symptoms of infections/cellulitis and be independent in skin care to prevent cellulitis. Patient educated in skin care and prevention of cellulitis. Patient has history of cellulitis. Patient has purchased and is using  recommended skin care products and her skin integrity has improved. Patient independent with skin care at this time. Goal Met 12/2/19  2. Patient will demonstrate independence in lymphedema home program of therapeutic exercises to improve circulation and decongest limb to improve ADLs. Patient working on the basic active range of motion lymphedema remedial exercises in the home. Time is limited in sessions as she is often late for her appointments. Patient has learned the [employer] Routine with assist and given handouts and how to obtain a ball for home use. Will monitor this goal to make sure that she is independent with modified lymphedema exercise routines. 3.  Patient will tolerate multi-layer bandages (MLB) and show measureable decrease in limb volume to allow ordering of home compression system (daytime, nighttime garments and pump as needed). Patient arrived with MLB in place that was donned by her sister and it was applied well. The bandage was in place well and sister will continue to assist until her garments arrive. Patient was measured for custom Circaid Juxtafit lower legs garments and also Circaid Ankle Foot Wraps in small. Ordered additional liner socks (closed toe) and also [address]  Order being filled by [employer].  Patient received her Vaso-pneumatic pump Flexitouch Plus and has been trained in the home patient reports using it daily alternating legs each day. Patient now with all items ordered or received. Will wait for nighttime garments to be addressed in the future. Goal Met 12/12/19    Long term goals  Time frame: 12/27/19  1. Pt will show improvement in the [facility] Lymphedema Assessment Scale by decreasing the score to 10 and thus allow improvement in patient's quality of life. Deferred   2. Patient will be independent with don/doff of compression system and use in order to prevent reaccumulation of fluid at discharge. Deferred   3. Pt will be independent in self-MLD and show stable limb volumes showing decongestion and pt. ready for transition to independent restorative phase of lymphedema therapy. Patient encouraged to to work on Self MLD in the home. Full volumes taken last visit to reveal that patient has lost 155 ml on the L LE and 420 ml on the R LE since last volumes were taken on 12/12/19. Patient has lost a total of 7919 ml on the L LE and 4764 ml on the R LE since evaluation on 10/1/19. (500 ml= 1 pound)  L LE 18,932.67 ml compared to 26,851.82 ml on 10/1/19  R LE 17,074 ml compared to 21,838.07 ml on 10/1/19  Today patient's percent difference is 10.88% compared to 22.96% on evaluation 10/1/19. Will continue to monitor. SUBJECTIVE REPORT:  Patient arrived today late for her appointment. Patient staying in [facility] as instructed to the L LE and today came in for follow up and to bandaged bilaterally so that she can travel to [place] tomorrow. Pain:0/10   Gait:  Level of assistance:  Modified independent  Assistive device: rollator  ADLs: Modified Independent for most. Needs assistance with activities below the knee. Treatment Response: Patient is awaiting arrival of her day time compression garments. Patient measured for night time garments today.     Function: Patient requiring less assistance with completion of home program/Patient's sister able to assist with MLB. [facility] Lymphedema Assessment Scale: Deferred   Weight: 405.6 lbs down from 411.8 lbs  TREATMENT AND OBJECTIVE DATA SUMMARY:   Patient/Family Education:      Educated in skin care: Reviewed skin care principles   Skin care products  Hygiene  Prevention of cellulitis   Educated in exercise: Walking program  ROM routine   Instructed in self MLD: Demonstration and instruction of Self MLD with skin care done today during MLD portion of the session. Instructed in don/doff of compression system: Multi-layer bandage (MLB) donning principles and wear precautions. Further instruction in bandaging application needed. Patient's sister able to bandage and her technique is good to assist patient with maintaining MLB until her garments arrive. Patient will need to continue to bandage until her compression garments arrive and she is aware of this. Able so start B LE bandaging today. Awaiting arrival of compression garments. Therapeutic Activity 0 minutes   Treatment time: 0  Functional Mobility: Modified [place]    Fall risk: Moderate     Therapeutic Exercise/Procedure 0 minutes   Treatment time: 0  Maru ball exercise program: Patient educated on the [employer] routine with  assist for ball placement. Omitted the initial neck range of motion per patient's report of certain neck movements can cause her to pass out and last exercise modified to marching. Patient given handout with modifications and how to purchase [employer] for home use. Free exercises/ROM: Patient working on modified active range of motion routine in the home. Today able to demonstrate good range of motion below the knee with bandaging education and application that required many knee flexion and extension reps along with ankle pumps.      Home program: Patient to perform daily to BID:  Skin care  Deep abdominal breathing  Exercise routine  Walking program  Rest in supine  Compression bandage  Self manual lymph draining (MLD)  Bring supplies to each therapy visit  Purchase necessary supplies  Weight loss program-Issued patient the recommended foods list for lymphedema on previous visits. Rationale: Exercise will increase the lymph angiomotoricity and tissue pressure of the skin and thus decrease swelling. Modalities 0 minutes   Treatment time: 0  Vasopneumatic pump: Patient tolerating once daily alternating legs and using trunk piece. Manual Lymphatic Drainage (MLD) 85 minutes   Treatment time:8:20am-9:45am  Area to decongest: LE: Right, Left and Trunk   Sequence used and effectiveness: Secondary sequence for lower extremities with trunk involvement   Continued education in MLD principles with skin care. Skin/wound care/debridement: Dermal Status:  [x]   Intact [x]  Dry -L heal improved with use of Flexitol Heel Balm in the home.     []  Tenuous [] Flaky    []  Wound/lesion present []  Scars   []  Dermatitis     Texture/Consistency:  []  Boggy []  Pitting Edema   []  Brawny []  Combination   []  Fibrotic/Woody     Pigmentation/Color Change:  []  Normal []  Hemosiderin   []  Red []  Erythematous   [x]  Hyperpigmented []  Hyperlipodermatosclerosis   Anomalies:  []  Lymphorrhea []  Vesicles   []  Petechiae []  Warty Vercusis   []  Bullae []  Papilloma   Circulatory:  []  JOSE []  Varicosities:   [x]  Pulses- pedal pulses palpable bilateral lower extremities [x]  Vascular studies ruled out DVT in past 8/2019   []  DVT History     Nails:  []   Normal  [x]   Fungus  (Nails need to be trimmed by podiatrist. [place] information on how to arrange an appointment to get this done.)    Patient had breakdown in posterior knee crease that was cleansed and A&D applied. B LE was cleansed with [employer]ap. Eucrin and [employer] Balm applied using MLD principles. Patient has purchased the products that were recommended for use in the home. Applied multi-layer compression bandaging to: Bilateral  lower extremity: Below knee  The following multi-layer bandages were applied:  Tricofix  (F)    Padding layer:  Cellona  Fleece 15 cm    Foam:  12 cm roll and additional 10 cm foam to build shape    Short stretch bandages:   6 cm  8 cm  10 cm   12cm (has if needed)  Comperm to cover entire bandage. Comperm applied to the R LE and will be removed in the evenings. Patient's sister will continue to assist with bandaging until her garments arrive. Upper/Lower extremity compression: Awaiting order for Custom Circaid Juxtafit lower legs garments and also Circaid Ankle Foot Wraps in small. Ordered additional liner socks (closed toe) and also [address] Today patient wanted to proceed with ordering night time garments so this was done and patient choose [employer] Low Profile A-G garments. Kinesiotaping: Deferred    Girth/Volume measurement: Full volumes taken last visit to reveal that patient has lost 155 ml on the L LE and 420 ml on the R LE since last volumes were taken on 12/12/19. Patient has lost a total of 7919 ml on the L LE and 4764 ml on the R LE since evaluation on 10/1/19. (500 ml= 1 pound)  L LE 18,932.67 ml compared to 26,851.82 ml on 10/1/19  R LE 17,074 ml compared to 21,838.07 ml on 10/1/19  Today patient's percent difference is 10.88% compared to 22.96% on evaluation 10/1/19. ASSESSMENT:   Treatment effectiveness and tolerance: Patient continues to do well with assist from sister in maintaining MLB in between visits. Patient's volumes are stabilizing and her percent difference is now 10.88% compared to 22.96% on evaluation 10/1/19. Patient has lost approximately 25 lbs of fluid in B LEs since starting treatment. Patient's overall weight is not reflective of this so continue to recommend health diet and expect that once all of her compression is in place she will continue to lose volumes and weight. Patient leaving tomorrow for her vacation with family and will utilize MLB bilaterally as recommended and will also bring her Flexitouch Plus device with her on the trip for daily use. Patient will return after her trip  for follow up and is hopeful that her compression garments will have arrived. Patient to call clinic if she has any questions before to the clinic. Progress toward goals: See goal section for details. Short term goal 1+3 met. All other goals continue. PLAN OF CARE:   Changes to the plan of care: Continue with plan of care established at evaluation.      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

## 2020-01-02 ENCOUNTER — HOSPITAL ENCOUNTER (OUTPATIENT)
Dept: PHYSICAL THERAPY | Age: 60
Discharge: HOME | End: 2020-01-02
Payer: COMMERCIAL

## 2020-01-02 VITALS — BODY MASS INDEX: 43.4 KG/M2 | WEIGHT: 293 LBS | HEIGHT: 69 IN

## 2020-01-02 PROCEDURE — 97140 MANUAL THERAPY 1/> REGIONS: CPT

## 2020-01-02 PROCEDURE — 97110 THERAPEUTIC EXERCISES: CPT

## 2020-01-02 NOTE — PROGRESS NOTES
[facility]  Physical Therapy Lymphedema Clinic  [address], [address], [facility] 22.    LYMPHEDEMA THERAPY  VISIT: 11    []                  Daily note               [x]                 90 day Reassessment with Updated Plan of Care/Progress note    NAME: Solomon Sena  DATE: 1/2/2020  Patient returns today for the first time since 12/19/19. Will update plan of care. GOALS  Short term goals  Time frame: 11/20/19 (All extended to 12/27/19)  1. Patient will demonstrate knowledge of signs/symptoms of infections/cellulitis and be independent in skin care to prevent cellulitis. Patient educated in skin care and prevention of cellulitis. Patient has history of cellulitis. Patient has purchased and is using  recommended skin care products and her skin integrity has improved. Patient independent with skin care at this time. Goal Met 12/2/19  2. Patient will demonstrate independence in lymphedema home program of therapeutic exercises to improve circulation and decongest limb to improve ADLs. Patient working on the basic active range of motion lymphedema remedial exercises in the home. Time is limited in sessions as she is often late for her appointments. Patient has learned the [facility] Routine with assistance. She has been provided with handouts and information how to obtain a ball for home use. Patient is independent with modified lymphedema exercise routines. Goal Met 1/2/20  3. Patient will tolerate multi-layer bandages (MLB) and show measureable decrease in limb volume to allow ordering of home compression system (daytime, nighttime garments and pump as needed). Patient arrived with MLB in place that was donned by her sister and it was applied well. The bandage was applied well and sister will continue to assist until her garments arrive. Patient was measured for custom Circaid Juxtafit lower legs garments and also Circaid Ankle Foot Wraps in small. Ordered additional liner socks (closed toe) and also [address] Order being filled by [employer].  Patient received her Vaso-pneumatic pump Flexitouch Plus and has been trained in the home patient reports using it daily alternating legs each day. Patient now with all items ordered or received. Will wait for nighttime garments to be addressed in the future. Goal Met 12/12/19    Long term goals  Time frame: 12/27/19  1. Pt will show improvement in the [facility] Lymphedema Assessment Scale by decreasing the score to 10 and thus allow improvement in patient's quality of life. Patient scored a 12 out of 20 on the [facility] Lymphedema Assessment Scale today. This scores is only down 1 point from her initial evaluation, despite all of her progress. Will continue to monitor. Extend goal.   2.  Patient will be independent with don/doff of compression system and use in order to prevent reaccumulation of fluid at discharge. Patient just received her new velcro compression garments and compression capris. Patient and  able to learn how to don/doff and take care of her new products. Patient will need assistance initially for donning. Will reassess on next visit. Extend goal.   3.  Pt will be independent in self-MLD and show stable limb volumes showing decongestion and pt. ready for transition to independent restorative phase of lymphedema therapy. Patient encouraged to to work on Self MLD in the home. Patient also has a vaso-pneumatic pump to use in the home setting. Full volumes taken last visit to reveal that patient has lost 209 ml on the L LE and gained 612 ml on the R LE since last volumes were taken on 12/19/19. Patient has lost a total of 8128 ml on the L LE and 4152 ml on the R LE since evaluation on 10/1/19.   (500 ml= 1 pound) (~25 lbs)  L LE 18,723.97 ml compared to 26,851.82 ml on 10/1/19  R LE 17,686.16 ml compared to 21,838.07 ml on 10/1/19  Today patient's percent difference is 5.87% compared to 22.96% on evaluation 10/1/19. Volumes overall are stable, but will monitor with transition to her new compression garments. Extend goal.        SUBJECTIVE REPORT:  Patient arrived today late for her appointment. Patient has not been bandaged for 2 days as she reported that her legs had been sensitive after having 2 falls on her vacation. Patient noted to have improved with soreness post falls, but has multiple bruises. Patient reports that she is eager to be fitted with her new products today.  present and also learned how to assist patient with her new compression garments as needed. Pain:0/10   Gait:  Level of assistance:  Modified independent  Assistive device: rollator  ADLs: Modified Independent for most. Needs assistance with activities below the knee. Treatment Response: Patient received her day time velcro products and compression capris. Fitted and educated patient today. Function: Patient requiring less assistance with completion of home program/Patient's sister able to assist with MLB.  will be able to assist her with her verbal cues to don her new products. Patient able to doff them herself independently. [facility] Lymphedema Assessment Scale: Patient scored a 12 out of 20 today on the scale will continue to monitor. This scores is only down 1 point from her initial evaluation, despite all of her progress. Weight: 423.2 lbs up from 405.6 lbs last visit. Discussed nutrition and healthy choices. BMI =62.5  TREATMENT AND OBJECTIVE DATA SUMMARY:   Patient/Family Education:      Educated in skin care: Reviewed skin care principles   Skin care products  Hygiene  Prevention of cellulitis   Educated in exercise: Walking program  Maru ball routine  ROM routine   Instructed in self MLD: Demonstration and instruction of Self MLD with skin care done today during MLD portion of the session.    Instructed in don/doff of compression system: Multi-layer bandage (MLB) donning principles and wear precautions. Further instruction in bandaging application needed. Patient's sister able to bandage and her technique is good to assist patient as needed with MLB. Patient received her day time garments and was educated in don/doff, wear and care of the products today. Therapeutic Activity 0 minutes   Treatment time: 0  Functional Mobility: Modified [place]    Fall risk: Moderate     Therapeutic Exercise/Procedure 10 minutes   Treatment time: 9:30am-9:40am   Maru ball exercise program: Patient educated previously on the [employer] routine with assist for ball placement. Omitted the initial neck range of motion per patient's report of certain neck movements can cause her to pass out and the last was exercise modified to marching. Patient given handout with modifications and how to purchase [employer] for home use. Free exercises/ROM: Patient working on modified active range of motion routine in the home. Demonstrated good understanding of her modified exercise routine today and is independent with her handouts in the home setting with modifications. Home program: Patient to perform daily to BID:  Skin care  Deep abdominal breathing  Exercise routine  Walking program  Rest in supine  Compression bandage  Self manual lymph draining (MLD)  Bring supplies to each therapy visit  Purchase necessary supplies-purchased bandage supplies. Weight loss program-Issued patient the recommended foods list for lymphedema on previous visits. Rationale: Exercise will increase the lymph angiomotoricity and tissue pressure of the skin and thus decrease swelling. Modalities 0 minutes   Treatment time: 0  Vasopneumatic pump: Patient tolerating once daily alternating legs and using trunk piece.       Manual Lymphatic Drainage (MLD) 65 minutes   Treatment time:8:25am-9:30am   Area to decongest: LE: Right, Left and Trunk   Sequence used and effectiveness: Secondary sequence for lower extremities with trunk involvement   Continued education in MLD principles with skin care. Skin/wound care/debridement: Dermal Status:  [x]   Intact [x]  Dry -L heal improved with use of Flexitol Heel Balm in the home.     []  Tenuous [] Flaky    []  Wound/lesion present []  Scars   []  Dermatitis     Texture/Consistency:  []  Boggy []  Pitting Edema   []  Brawny []  Combination   []  Fibrotic/Woody     Pigmentation/Color Change:  []  Normal []  Hemosiderin   []  Red []  Erythematous   [x]  Hyperpigmented []  Hyperlipodermatosclerosis   Anomalies:  []  Lymphorrhea []  Vesicles   []  Petechiae []  Warty Vercusis   []  Bullae []  Papilloma   Circulatory:  []  JOSE []  Varicosities:   [x]  Pulses- pedal pulses palpable bilateral lower extremities [x]  Vascular studies ruled out DVT in past 8/2019   []  DVT History     Nails:  []   Normal  [x]   Fungus  (Nails need to be trimmed by podiatrist. Julio Lynne information on how to arrange an appointment to get this done.)    Patient has purchased the products that were recommended for use in the home. Applied multi-layer compression bandaging to: Patient was not bandaged on arrival, but did bring in supplies to use as needed. Patient reports that she has not bandaged in 2 days because of recovering from two falls from her vacation had caused her legs to be very sensitive. Patient reports that this had improved today. Patient aware to bandage as needed as she adjusts to her new products. Upper/Lower extremity compression: Patient received Custom Circaid Juxtafit lower legs garments and also Circaid Ankle Foot Wraps in small. Additional liner socks (closed toe) and also [address] 3X-Large. All items were fitted to patient and education provided on don/doff, wear schedule, and how to care for items.  Patient's  also recorded on his phone how to applied velcro products and how to use the card to test mmHg on each strap. Patient also given time to work with her  on applying herself and was able to do well with slight assistance. Patient felt confident that they would be able to manage the velcro products in the home. Patient also was fitted with her [address] in [place]. Patient needed assistance pulling the garment up in the back, but she reports that with more practice she will get used to them. The fit of all of her products were good and patient able to leave the clinic with lower velcro garments and capris in place with her tennis shoes accommodating the products well. Patient aware to remove and assess skin in a few hours and if she had further questions to contact the clinic prior to her next visit. Awaiting order for night time garments [employer] T[employer] Low Profile A-G garments. Kinesiotaping: Deferred    Girth/Volume measurement: Full volumes taken last visit to reveal that patient has lost 209 ml on the L LE and gained 612 ml on the R LE since last volumes were taken on 12/19/19. Patient has lost a total of 8128 ml on the L LE and 4152 ml on the R LE since evaluation on 10/1/19. (500 ml= 1 pound) (~25 lbs)  L LE 18,723.97 ml compared to 26,851.82 ml on 10/1/19  R LE 17,686.16 ml compared to 21,838.07 ml on 10/1/19  Today patient's percent difference is 5.87% compared to 22.96% on evaluation 10/1/19. ASSESSMENT:   Treatment effectiveness and tolerance: Patient returns today after going on vacation with her family where she reported she had two falls and has been sore post falls making it difficult for her to maintain her MLB. Despite these set backs she was able to maintain stable overall volumes. Patient now has percent difference of 5.87% compared to 22.96% on evaluation in October.  Despite loss of 25 lbs in lower leg volumes, patient's overall weight continues to be of concern as she has not been able to improve and maintain her weight with a current BMI 62.5.  Patient today was able to be fitted with her new custom velcro garments for day time and her compression capris so will have her return in 2 weeks to reassess the effectiveness and how she is managing them in the home setting. Patient is also awaiting her night time garments that were ordered on her last appointment. Patient should be ready for discharge to the restorative phase of care once all of her items are in place. Will need updated plan of care to continue to work toward remaining long term goals. Progress toward goals: See goal section for details. All short term goals met. Will extend remaining long term goals. PLAN OF CARE:   Changes to the plan of care: Patient will need an updated plan of care to continue to work toward remaining goals and to assess the function of her new day time garments and the night time garments that she has not received to date. Patient will be able to be discharged to the restorative phase of care once she has received all items and fit/function has been assessed. 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 I certify the above prescribed services are required by this patient and are medically necessary.   The above plan of care has been developed in conjunction with the lymphedema/physical therapist.   Physician Signature: ____________________________________________________      Dr. Megan Manley  Date: _________

## 2020-01-09 RX ORDER — DICLOFENAC SODIUM 10 MG/G
GEL TOPICAL 4 TIMES DAILY
Qty: 100 G | Refills: 5 | Status: SHIPPED | OUTPATIENT
Start: 2020-01-09

## 2020-01-10 RX ORDER — LOSARTAN POTASSIUM 50 MG/1
TABLET ORAL
Qty: 90 TAB | Refills: 3 | Status: SHIPPED | OUTPATIENT
Start: 2020-01-10 | End: 2021-02-11 | Stop reason: SDUPTHER

## 2020-01-14 NOTE — PROGRESS NOTES
Bedside and Verbal shift change reportt given to [email or web address] (oncoming nurse) by Taz Larson (offgoing nurse). Report included the following information SBAR, Kardex, Intake/Output and MAR. ERM does NOT APPEAR VISUALLY SIGNIFICANT.

## 2020-01-16 ENCOUNTER — HOSPITAL ENCOUNTER (OUTPATIENT)
Dept: PHYSICAL THERAPY | Age: 60
Discharge: HOME | End: 2020-01-16
Payer: COMMERCIAL

## 2020-01-16 VITALS — HEIGHT: 69 IN | WEIGHT: 293 LBS | BODY MASS INDEX: 43.4 KG/M2

## 2020-01-16 PROCEDURE — 97140 MANUAL THERAPY 1/> REGIONS: CPT

## 2020-01-16 NOTE — PROGRESS NOTES
[facility]  Physical Therapy Lymphedema Clinic  [address] [facility], [address] [facility], [facility] [address].    LYMPHEDEMA THERAPY  VISIT: 12    [x]                  Daily note               []                 30 day Reassessment/Progress note    NAME: Ashu Winkler  DATE: 1/16/2020  GOALS  Short term goals  Time frame: 11/20/19 (All extended to 12/27/19)  1. Patient will demonstrate knowledge of signs/symptoms of infections/cellulitis and be independent in skin care to prevent cellulitis. Patient educated in skin care and prevention of cellulitis. Patient has history of cellulitis. Patient has purchased and is using  recommended skin care products and her skin integrity has improved. Patient independent with skin care at this time. Goal Met 12/2/19  2. Patient will demonstrate independence in lymphedema home program of therapeutic exercises to improve circulation and decongest limb to improve ADLs. Patient working on the basic active range of motion lymphedema remedial exercises in the home. Time is limited in sessions as she is often late for her appointments. Patient has learned the [facility] Routine with assistance. She has been provided with handouts and information how to obtain a ball for home use. Patient is independent with modified lymphedema exercise routines. Goal Met 1/2/20  3. Patient will tolerate multi-layer bandages (MLB) and show measureable decrease in limb volume to allow ordering of home compression system (daytime, nighttime garments and pump as needed). Patient arrived with MLB in place that was donned by her sister and it was applied well. The bandage was applied well and sister will continue to assist until her garments arrive. Patient was measured for custom Circaid Juxtafit lower legs garments and also Circaid Ankle Foot Wraps in small. Ordered additional liner socks (closed toe) and also [address]  Order being filled by [employer].  Patient received her Vaso-pneumatic pump Flexitouch Plus and has been trained in the home patient reports using it daily alternating legs each day. Patient now with all items ordered or received. Will wait for nighttime garments to be addressed in the future. Goal Met 12/12/19    Long term goals  Time frame: 12/27/19( Extended to 3/24/2020)  1. Pt will show improvement in the [facility] Lymphedema Assessment Scale by decreasing the score to 10 and thus allow improvement in patient's quality of life. Last visit patient scored a 12 out of 20 on the [facility] Lymphedema Assessment Scale. This scores is only down 1 point from her initial evaluation, despite all of her progress. 2.  Patient will be independent with don/doff of compression system and use in order to prevent reaccumulation of fluid at discharge. Patient using her garments for daytime and the fit is good. Patient getting assistance from her daughter. Brought in her night time garments for assessment today. Initial fit is good, but will check for any alterations next visit. 3.  Pt will be independent in self-MLD and show stable limb volumes showing decongestion and pt. ready for transition to independent restorative phase of lymphedema therapy. Patient encouraged to to work on Self MLD in the home. Patient also has a vaso-pneumatic pump to use in the home setting. Full volumes taken today to reveal that patient has gained 1026 ml on the L LE and gained 548 ml on the R LE since last volumes were taken on 1/2/2020. Patient has lost a total of 7101 ml on the L LE and 3604 ml on the R LE since evaluation on 10/1/19. (500 ml= 1 pound)   L LE 19,750.37 ml compared to 26,851.82 ml on 10/1/19  R LE 18,234.55 ml compared to 21,838.07 ml on 10/1/19  Today patient's percent difference is 8.31% compared to 22.96% on evaluation 10/1/19.   Volumes overall are stable, but will monitor with transition to her new compression garments. SUBJECTIVE REPORT: Patient arrived today late for her appointment. Patient received her night time garments and came in for fitting and to assess her day time garments. Pain:0/10   Gait:  Level of assistance:  Modified independent  Assistive device: rollator  ADLs: Modified Independent for most. Needs assistance with activities below the knee. Treatment Response: Patient received her day time velcro products and compression capris. Fitted and educated patient today. Function: Patient requiring less assistance with completion of home program/Patient's sister able to assist with MLB.  will be able to assist her with her verbal cues to don her new products. Patient able to doff them herself independently. [facility] Lymphedema Assessment Scale: Patient scored a 12 out of 20 today on the scale will continue to monitor. This scores is only down 1 point from her initial evaluation, despite all of her progress. Weight: 416.3 lbs compared to 423.2 lbs. Discussed nutrition and healthy choices. TREATMENT AND OBJECTIVE DATA SUMMARY:   Patient/Family Education:      Educated in skin care: Reviewed skin care principles   Skin care products  Hygiene  Prevention of cellulitis   Educated in exercise: Walking program  Maru ball routine  ROM routine   Instructed in self MLD: Demonstration and instruction of Self MLD with skin care done today during MLD portion of the session. Instructed in don/doff of compression system: Multi-layer bandage (MLB) donning principles and wear precautions. Further instruction in bandaging application needed. Patient's sister able to bandage and her technique is good to assist patient as needed with MLB. Patient received her day time garments and was educated in don/doff, wear and care of the products today. Therapeutic Activity 0 minutes   Treatment time: 0  Functional Mobility: Modified [place]    Fall risk:  Moderate     Therapeutic Exercise/Procedure 0 minutes   Treatment time: 0   Maru ball exercise program: Patient educated previously on the [employer] routine with assist for ball placement. Omitted the initial neck range of motion per patient's report of certain neck movements can cause her to pass out and the last was exercise modified to marching. Patient given handout with modifications and how to purchase [employer] for home use. Free exercises/ROM: Patient working on modified active range of motion routine in the home. Demonstrated good understanding of her modified exercise routine  and is independent with her handouts in the home setting with modifications. Home program: Patient to perform daily to BID:  Skin care  Deep abdominal breathing  Exercise routine  Walking program  Rest in supine  Compression bandage  Compression garments  Self manual lymph draining (MLD)  Bring supplies to each therapy visit  Purchase necessary supplies-purchased bandage supplies. Weight loss program-Issued patient the recommended foods list for lymphedema on previous visits. Rationale: Exercise will increase the lymph angiomotoricity and tissue pressure of the skin and thus decrease swelling. Modalities 0 minutes   Treatment time: 0  Vasopneumatic pump: Patient not using pump daily. Encouraged to use daily alternating legs and using trunk piece. Manual Lymphatic Drainage (MLD) 85 minutes   Treatment time:8:20am-9:45am   Area to decongest: LE: Right, Left and Trunk   Sequence used and effectiveness: Secondary sequence for lower extremities with trunk involvement   Continued education in MLD principles with skin care.     Skin/wound care/debridement: Dermal Status:  [x]   Intact [x]  Dry -L heal improved with use of Flexitol Heel Balm in the home.     []  Tenuous [] Flaky    []  Wound/lesion present []  Scars   []  Dermatitis     Texture/Consistency:  []  Boggy []  Pitting Edema   []  Brawny []  Combination   []  Fibrotic/Woody   Pigmentation/Color Change:  []  Normal []  Hemosiderin   []  Red []  Erythematous   [x]  Hyperpigmented []  Hyperlipodermatosclerosis   Anomalies:  []  Lymphorrhea []  Vesicles   []  Petechiae []  Warty Vercusis   []  Bullae []  Papilloma   Circulatory:  []  JOSE []  Varicosities:   [x]  Pulses- pedal pulses palpable bilateral lower extremities [x]  Vascular studies ruled out DVT in past 8/2019   []  DVT History     Nails:  []   Normal  [x]   Fungus  (Nails need to be trimmed by podiatrist. Delesurekha [place] information on how to arrange an appointment to get this done.)    Patient has purchased the products that were recommended for use in the home. Applied multi-layer compression bandaging to: Deferred today for working with new garments. Upper/Lower extremity compression: Patient arrived [facility] Custom Circaid Juxtafit lower legs garments and also Circaid Ankle Foot Wraps in small. Additional liner socks (closed toe). Also has [address] 3X-Large but did not wear in today. Patient reports they are fitting well. Patient and education provided on don/doff, wear schedule, and how to care for items. Patient's  also recorded on his phone how to applied velcro products and how to use the card to test mmHg on each strap. Daughter assisting in the home and only needs to improve the placement of the last strap on foot piece. Patient received her [employer] Low Profile A-G garments and the fit was assessed today. Initial fit is good and patient will have assist in the home from her daughter. Recommended that she alternate legs and use nightly to assess over the next week if changes need to be made. Kinesiotaping: Deferred    Girth/Volume measurement: Full volumes taken today to reveal that patient has gained 1026 ml on the L LE and gained 548 ml on the R LE since last volumes were taken on 1/2/2020.  Patient has lost a total of 7101 ml on the L LE and 3604 ml on the R LE since evaluation on 10/1/19. (500 ml= 1 pound)   L LE 19,750.37 ml compared to 26,851.82 ml on 10/1/19  R LE 18,234.55 ml compared to 21,838.07 ml on 10/1/19  Today patient's percent difference is 8.31% compared to 22.96% on evaluation 10/1/19. ASSESSMENT:   Treatment effectiveness and tolerance: Patient has been working with her daytime garments and getting used to daily use, but her volumes were up today 1026 ml on the L LE and 548 ml on the R LE. Patient reporting that she has not been performing all aspects of her home program, so encouraged to perform all  4 CDT aspects and begin using her night time garments alternating legs nightly and adding the power jackets, once she is comfortable with use. Patient overall is doing well with volume reduction from evaluation, but needs to focus on consistency with home program and weight loss (BMI 61.4). Patient to contact clinic with any questions or concerns prior to next visit. Scheduled to return 1/30/2020. Progress toward goals: See goal section for details. All short term goals met. Will extend remaining long term goals.       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

## 2020-01-26 RX ORDER — ROSUVASTATIN CALCIUM 5 MG/1
TABLET, COATED ORAL
Qty: 90 TAB | Refills: 3 | Status: SHIPPED | OUTPATIENT
Start: 2020-01-26 | End: 2021-02-18

## 2020-01-30 ENCOUNTER — HOSPITAL ENCOUNTER (OUTPATIENT)
Dept: PHYSICAL THERAPY | Age: 60
Discharge: HOME | End: 2020-01-30
Payer: COMMERCIAL

## 2020-01-30 NOTE — PROGRESS NOTES
[facility]  Lymphedema Clinic  [address] [facility], [address], [address] [place] [address] Drive THERAPY      1/30/2020:  Kellie Lima was not seen on this date for physical therapy for the following reason(s):    Patient called to cancel the visit for the following reasons: Other patient having difficulty walking today and was fearful of falling. Patient reports that her garments are fitting well and was able to reschedule for 2/17/2020 at 8:00am. Patient can contact the clinic if she needs to follow up sooner or has any questions.      Prabhakar Jha, PTA, CLT

## 2020-02-17 ENCOUNTER — HOSPITAL ENCOUNTER (OUTPATIENT)
Dept: PHYSICAL THERAPY | Age: 60
Discharge: HOME | End: 2020-02-17
Payer: SELF-PAY

## 2020-02-17 VITALS — HEIGHT: 69 IN | BODY MASS INDEX: 43.4 KG/M2 | WEIGHT: 293 LBS

## 2020-02-17 PROCEDURE — 97140 MANUAL THERAPY 1/> REGIONS: CPT

## 2020-02-17 NOTE — PROGRESS NOTES
[facility]  Physical Therapy Lymphedema Clinic  [address], [address], [facility] 22.    LYMPHEDEMA THERAPY  VISIT: 13    []                  Daily note               [x]                 30 day Reassessment/Progress note    NAME: Sweetie Crawley  DATE: 2/17/2020  GOALS  Short term goals  Time frame: 11/20/19 (All extended to 12/27/19)  1. Patient will demonstrate knowledge of signs/symptoms of infections/cellulitis and be independent in skin care to prevent cellulitis. Patient educated in skin care and prevention of cellulitis. Patient has history of cellulitis. Patient has purchased and is using  recommended skin care products and her skin integrity has improved. Patient independent with skin care at this time. Goal Met 12/2/19  2. Patient will demonstrate independence in lymphedema home program of therapeutic exercises to improve circulation and decongest limb to improve ADLs. Patient working on the basic active range of motion lymphedema remedial exercises in the home. Time is limited in sessions as she is often late for her appointments. Patient has learned the [employer] Routine with assistance. She has been provided with handouts and information how to obtain a ball for home use. Patient is independent with modified lymphedema exercise routines. Goal Met 1/2/20  3. Patient will tolerate multi-layer bandages (MLB) and show measureable decrease in limb volume to allow ordering of home compression system (daytime, nighttime garments and pump as needed). Patient arrived with MLB in place that was donned by her sister and it was applied well. The bandage was applied well and sister will continue to assist until her garments arrive. Patient was measured for custom Circaid Juxtafit lower legs garments and also Circaid Ankle Foot Wraps in small. Ordered additional liner socks (closed toe) and also [address]  Order being filled by [employer].  Patient received her Vaso-pneumatic pump Flexitouch Plus and has been trained in the home patient reports using it daily alternating legs each day. Patient now with all items ordered or received. Will wait for nighttime garments to be addressed in the future. Goal Met 12/12/19    Long term goals  Time frame: 12/27/19( Extended to 3/24/2020)  1. Pt will show improvement in the [employer] Lymphedema Assessment Scale by decreasing the score to 10 and thus allow improvement in patient's quality of life. Last visit patient scored a 12 out of 20 on the [employer] Lymphedema Assessment Scale. This scores is only down 1 point from her initial evaluation, despite all of her progress. 2.  Patient will be independent with don/doff of compression system and use in order to prevent reaccumulation of fluid at discharge. Patient using her garments for daytime and the fit is good. Patient getting assistance from her daughter with don and doff.  also helping some as well. Patient had called to cancel her 1/30/2020 visit, but reported garments were fitting better, overall fit is good. There are still some slight modifications with how garments are applied in the home that they need to work on to improve results. These were discussed with patient and she verbally reported understanding. Will reassess next visit. 3.  Pt will be independent in self-MLD and show stable limb volumes showing decongestion and pt. ready for transition to independent restorative phase of lymphedema therapy. Patient encouraged to to work on Self MLD in the home. Patient also has a vaso-pneumatic pump to use in the home setting but using it only 1-2 times a week. Full volumes taken today to reveal that patient has gained 1157 ml on the L LE and gained 392 ml on the R LE since last volumes were taken on 1/16/2020.  Patient has lost a total of 5945 ml on the L LE and 3212 ml on the R LE since evaluation on 10/1/19. (500 ml= 1 pound)   L LE 20,906.92 ml compared to 26,851.82 ml on 10/1/19  R LE 18,626.56 ml compared to 21,838.07 ml on 10/1/19  Today patient's percent difference is 12.24% compared to 22.96% on evaluation 10/1/19. Volumes continue to increase. Patient has not been able to work on all aspects of her home program so encouraged there to work on all aspects daily and issued a home program check list. Patient to work with this check list and return in 4 weeks for follow up. SUBJECTIVE REPORT: Patient arrived today late for her appointment. Patient reports that she was having pain with her L knee and also continuing to have issues with residual issues from her falls she had during her cruise. Patient reports that she has not been able to wear her garments consistently because of leg sensitivity. Patient reports that her left lower velcro slips down often, but noted today the garment was not placed as high as it needed to be so educated on this. Pain:6/10 (left knee and right shin) Patient reports that she has been taking Motrin for pain as needed and will contact the MD if pain continues as she is due to get an injection in the the R knee. Gait:  Level of assistance:  Modified independent  Assistive device: rollator  ADLs: Modified Independent for most. Needs assistance with activities below the knee. Treatment Response:Patient reports that she has had difficulty maintaining her home program    Function: Patient requiring less assistance with completion of home program/Patient's sister able to assist with MLB.  will be able to assist her with her verbal cues to don her new products. Patient able to doff them herself independently. [facility] Lymphedema Assessment Scale: Deferred today   Weight: 418.8 lbs up from 416.3 lbs. Discussed nutrition and healthy choices.    TREATMENT AND OBJECTIVE DATA SUMMARY:   Patient/Family Education:      Educated in skin care: Reviewed skin care principles   Skin care products  Hygiene  Prevention of cellulitis   Educated in exercise: Walking program  Maru ball routine  ROM routine   Instructed in self MLD: Demonstration and instruction of Self MLD with skin care done today during MLD portion of the session. Instructed in don/doff of compression system: Multi-layer bandage (MLB) donning principles and wear precautions. Further instruction in bandaging application needed. Patient's sister able to bandage and her technique is good to assist patient as needed with MLB. Patient received her day time garments and was educated in don/doff, wear and care of the products today. Therapeutic Activity 0 minutes   Treatment time: 0  Functional Mobility: Modified [place]    Fall risk: Moderate     Therapeutic Exercise/Procedure 0 minutes   Treatment time: 0   [place] exercise program: Patient educated previously on the [employer] routine with assist for ball placement. Omitted the initial neck range of motion per patient's report of certain neck movements can cause her to pass out and the last was exercise modified to marching. Patient given handout with modifications and how to purchase [employer] for home use. Free exercises/ROM: Patient working on modified active range of motion routine in the home. Demonstrated good understanding of her modified exercise routine  and is independent with her handouts in the home setting with modifications. Patient has been limited lately due to pain/sensitivity in her lower legs. Home program: Patient to perform daily to BID:  Skin care  Deep abdominal breathing  Exercise routine  Walking program  Rest in supine  Compression bandage  Compression garments  Self manual lymph draining (MLD)  Bring supplies to each therapy visit  Purchase necessary supplies-purchased bandage supplies. Weight loss program-Issued patient the recommended foods list for lymphedema on previous visits.     Rationale: Exercise will increase the lymph angiomotoricity and tissue pressure of the skin and thus decrease swelling. Modalities 0 minutes   Treatment time: 0  Vasopneumatic pump: Patient not using pump daily. Encouraged to use daily alternating legs and using trunk piece. Manual Lymphatic Drainage (MLD) 80 minutes   Treatment time:8:25am-9:45am  Area to decongest: LE: Right, Left and Trunk   Sequence used and effectiveness: Secondary sequence for lower extremities with trunk involvement   Continued education in MLD principles with skin care. Skin/wound care/debridement: Dermal Status:  [x]   Intact [x]  Dry -L heal improved with use of Flexitol Heel Balm in the home.     []  Tenuous [] Flaky    []  Wound/lesion present []  Scars   []  Dermatitis     Texture/Consistency:  []  Boggy []  Pitting Edema   []  Brawny []  Combination   []  Fibrotic/Woody     Pigmentation/Color Change:  []  Normal []  Hemosiderin   []  Red []  Erythematous   [x]  Hyperpigmented []  Hyperlipodermatosclerosis   Anomalies:  []  Lymphorrhea []  Vesicles   []  Petechiae []  Warty Vercusis   []  Bullae []  Papilloma   Circulatory:  []  JOSE []  Varicosities:   [x]  Pulses- pedal pulses palpable bilateral lower extremities [x]  Vascular studies ruled out DVT in past 8/2019   []  DVT History     Nails:  []   Normal  [x]   Fungus  (Nails need to be trimmed by podiatrist. José Miguel Kathleen information on how to arrange an appointment to get this done.)    Patient has purchased the products that were recommended for use in the home. Eucrin and Flexitol applied to L LE before bandaging today as patient had showered at home. Applied multi-layer compression bandaging to: Bilateral  lower extremity: Below knee for left lower leg. Patient requested MLB to assist with reduction of L LE due to increased volumes.     The following multi-layer bandages were applied:  Tricofix  (F)     Padding layer:  Cellona  Fleece 15 cm     Foam:  12 cm roll and additional 10 cm foam to build shape     Short stretch bandages:   6 cm  8 cm  10 cm   12cm (has if needed)  Comperm to cover entire bandage. Upper/Lower extremity compression: Patient arrived [facility] Custom Circaid Juxtafit lower legs garments and also Circaid Ankle Foot Wraps in small. Additional liner socks (closed toe). Also has [address] 3X-Large but did not wear in today. Patient reports they are fitting well. Patient and education provided on don/doff, wear schedule, and how to care for items. Patient's  also recorded on his phone how to applied velcro products and how to use the card to test mmHg on each strap. Daughter assisting in the home and only needs to improve the placement of the last strap on foot piece. Patient received her [employer] Low Profile A-G garments assessed again today and fit is still good. Expect that if she is consistent with use she will need to have altered by her 6 months visit. Patient has assist in the home from her daughter/ as needed. Recommended that she alternate legs and use nightly. Kinesiotaping: Deferred    Girth/Volume measurement: Full volumes taken today to reveal that patient has gained 1157 ml on the L LE and gained 392 ml on the R LE since last volumes were taken on 1/16/2020. Patient has lost a total of 5945 ml on the L LE and 3212 ml on the R LE since evaluation on 10/1/19. (500 ml= 1 pound)   L LE 20,906.92 ml compared to 26,851.82 ml on 10/1/19  R LE 18,626.56 ml compared to 21,838.07 ml on 10/1/19  Today patient's percent difference is 12.24% compared to 22.96% on evaluation 10/1/19. ASSESSMENT:   Treatment effectiveness and tolerance: Patient returned today for the first time since 1/16/2020. Patient has been struggling with mobility and pain and cancelled her 1/30/2020 visit. Patient returned today and has not been able to work on all aspects of her home program due to pain and sensitivity in lower legs.  She plans to contact MD as she is able to get an injection in R knee next month. Patient given a home program check list and encouraged to perform all 4 CDT aspects including wearing her compression consistently. She will return in 3-4 weeks for follow up. Patient should be ready to discharge to the restorative phase over the next couple of visits. Progress toward goals: See goal section for details. All short term goals met. Will extend remaining long term goals.       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

## 2020-03-02 RX ORDER — FUROSEMIDE 20 MG/1
TABLET ORAL
Qty: 90 TAB | Refills: 0 | Status: SHIPPED | OUTPATIENT
Start: 2020-03-02 | End: 2020-06-01

## 2020-03-09 ENCOUNTER — APPOINTMENT (OUTPATIENT)
Dept: PHYSICAL THERAPY | Age: 60
End: 2020-03-09

## 2020-03-26 ENCOUNTER — VIRTUAL VISIT (OUTPATIENT)
Dept: INTERNAL MEDICINE CLINIC | Age: 60
End: 2020-03-26

## 2020-03-26 ENCOUNTER — TELEPHONE (OUTPATIENT)
Dept: INTERNAL MEDICINE CLINIC | Age: 60
End: 2020-03-26

## 2020-03-26 ENCOUNTER — TELEPHONE (OUTPATIENT)
Dept: PHYSICAL THERAPY | Age: 60
End: 2020-03-26

## 2020-03-26 DIAGNOSIS — H10.022 PINK EYE DISEASE, LEFT: ICD-10-CM

## 2020-03-26 DIAGNOSIS — J40 BRONCHITIS: Primary | ICD-10-CM

## 2020-03-26 RX ORDER — DEXTROMETHORPHAN HYDROBROMIDE AND QUINIDINE SULFATE 20; 10 MG/1; MG/1
1 CAPSULE, GELATIN COATED ORAL EVERY 12 HOURS
COMMUNITY

## 2020-03-26 RX ORDER — DOXYCYCLINE 100 MG/1
100 TABLET ORAL 2 TIMES DAILY
Qty: 14 TAB | Refills: 0 | Status: SHIPPED | OUTPATIENT
Start: 2020-03-26 | End: 2020-06-29

## 2020-03-26 RX ORDER — POLYMYXIN B SULFATE AND TRIMETHOPRIM 1; 10000 MG/ML; [USP'U]/ML
1 SOLUTION OPHTHALMIC EVERY 6 HOURS
Qty: 1 BOTTLE | Refills: 0 | Status: SHIPPED | OUTPATIENT
Start: 2020-03-26 | End: 2020-03-31

## 2020-03-26 RX ORDER — GUAIFENESIN 100 MG/5ML
81 LIQUID (ML) ORAL DAILY
COMMUNITY

## 2020-03-26 NOTE — PATIENT INSTRUCTIONS
This is an established visit conducted via telemedicine. The patient has been instructed that this meets HIPAA criteria and acknowledges and agrees to this method of visitation. Lata Esqueda, [place] 
45/59/02 
7:46 PM 
 
 
Chief Complaint Patient presents with  Cough  
  x 1 week  prductive, green in color  Eye Drainage Grandson recently Dx with pink ( left eye)

## 2020-03-26 NOTE — PROGRESS NOTES
HISTORY OF PRESENT ILLNESS  Solomon Sena is a 62 y.o. female. HPI     Telehealth video visit due to corona virus pandemic    Pt c/o productive cough x 1 week and left eye crusted shut since this morning  No f/c sob or wheezes  Pt has been taking care of grandchild who just recovered from pink eye  Denies any eye pain      Patient Active Problem List    Diagnosis Date Noted    Pneumonia 04/03/2019    Subclinical hypothyroidism 12/19/2017    Morbid obesity with body mass index of 60.0-69.9 in adult [facility]) 03/01/2016    KEISHA (obstructive sleep apnea) 10/11/2010    Essential hypertension, benign 02/20/2010    PSVT ([facility]oxysmal supraventricular tachycardia) ([facility] 02/20/2010    Depression 02/20/2010    Carpal tunnel syndrome 02/20/2010    Pure hypercholesterolemia 02/20/2010     Current Outpatient Medications   Medication Sig Dispense Refill    dextromethorphan-quiNIDine (Nuedexta) 20-10 mg per capsule Take 1 Cap by mouth. 1 tab x 7 days , then 1 po tab q12h      aspirin 81 mg chewable tablet Take 81 mg by mouth daily.  trimethoprim-polymyxin b (POLYTRIM) ophthalmic solution Administer 1 Drop to left eye every six (6) hours for 5 days. 1 Bottle 0    doxycycline (ADOXA) 100 mg tablet Take 1 Tab by mouth two (2) times a day. 14 Tab 0    furosemide (LASIX) 20 mg tablet TAKE 1 TABLET BY MOUTH EVERY DAY 90 Tab 0    rosuvastatin (CRESTOR) 5 mg tablet TAKE 1 TABLET BY MOUTH DAILY 90 Tab 3    losartan (COZAAR) 50 mg tablet TAKE 1 TABLET BY MOUTH EVERY DAY 90 Tab 3    diclofenac (VOLTAREN) 1 % gel Apply  to affected area four (4) times daily. 100 g 5    verapamil ER (CALAN-SR) 240 mg CR tablet TAKE 1 TABLET BY MOUTH EVERY DAY 90 Tab 3    gabapentin (NEURONTIN) 400 mg capsule Take 400 mg by mouth three (3) times daily.  oxybutynin chloride XL (DITROPAN XL) 15 mg CR tablet Take 15 mg by mouth daily.  suvorexant (BELSOMRA) 20 mg tablet Take 20 mg by mouth nightly.       flecainide (TAMBOCOR) 100 mg tablet Take 100 mg by mouth daily.  duloxetine (CYMBALTA) 60 mg capsule Take 120 mg by mouth daily.  buPROPion XL (WELLBUTRIN XL) 300 mg XL tablet Take 300 mg by mouth every morning. Allergies   Allergen Reactions    Codeine Other (comments)     hallucinates    Lipitor [Atorvastatin] Myalgia    Pcn [Penicillins] Hives      Lab Results   Component Value Date/Time    GFR est non-AA 41 (L) 04/06/2019 05:56 AM    GFRNA, POC 55 (L) 08/04/2010 09:57 AM    GFR est AA 50 (L) 04/06/2019 05:56 AM    GFRAA, POC >60 08/04/2010 09:57 AM    Creatinine 1.31 (H) 04/06/2019 05:56 AM    Creatinine (POC) 1.1 08/04/2010 09:57 AM    BUN 27 (H) 04/06/2019 05:56 AM    BUN (POC) 17 08/04/2010 09:57 AM    Sodium 138 04/06/2019 05:56 AM    Sodium (POC) 141 08/04/2010 09:57 AM    Potassium 3.7 04/06/2019 05:56 AM    Potassium (POC) 4.1 08/04/2010 09:57 AM    Chloride 105 04/06/2019 05:56 AM    Chloride (POC) 106 08/04/2010 09:57 AM    CO2 26 04/06/2019 05:56 AM        ROS    Physical Exam  Constitutional:       Appearance: She is normal weight. Eyes:     Neurological:      Mental Status: She is alert. ASSESSMENT and PLAN  Diagnoses and all orders for this visit:    1. Bronchitis   Doxycycline x 7 days  2. Pink eye disease, left   polytrim eye drops   Strict handwashing and hygiene   Warm compress    To call or return if not improved  Other orders  -     trimethoprim-polymyxin b (POLYTRIM) ophthalmic solution; Administer 1 Drop to left eye every six (6) hours for 5 days. -     doxycycline (ADOXA) 100 mg tablet; Take 1 Tab by mouth two (2) times a day.

## 2020-03-26 NOTE — TELEPHONE ENCOUNTER
[facility]  Lymphedema Clinic  [address], [address], [address] [facility] NOTE      3/26/2020:  Patient will be discharged from lymphedema therapy at this time. Criteria for termination of care:    Patient has not returned to therapy. Cancelled her last 2 visits and has not rescheduled. Patient has all the compression items she needs. Would benefit from follow up in 6 months from last visit which was 2/17/2020. If you need any further information, please contact us at [phone].     Thank you for this referral.  Prabhakar Jha, PTA, CLT

## 2020-03-26 NOTE — TELEPHONE ENCOUNTER
Called, spoke to pt. Two identifiers confirmed. Virtual appointment scheduled for today @ 1 with Dr. Roberto Wharton. Pt verbalized understanding of information discussed w/ no further questions at this time.

## 2020-03-26 NOTE — TELEPHONE ENCOUNTER
Caller's first and last name: n/a   Reason for call: Pink eye in left eye  also has cough and sore throat for 1 week.    Callback required yes/no and why: Yes   Best contact number(s): [phone]   Details to clarify the request: n/a

## 2020-06-01 RX ORDER — FUROSEMIDE 20 MG/1
TABLET ORAL
Qty: 90 TAB | Refills: 0 | Status: SHIPPED | OUTPATIENT
Start: 2020-06-01 | End: 2020-08-29

## 2020-06-29 NOTE — PERIOP NOTES
[facility]  Ambulatory Surgery Unit  Pre-operative Instructions    Surgery/Procedure Date  07/6            Tentative Arrival Time 0930      1. On the day of your surgery/procedure, please report to the Ambulatory Surgery Unit Registration Desk and sign in at your designated time. The Ambulatory Surgery Unit is located in [facility] on the [facility] side of the [facility] across from the [address] [place] [address]. Please have all of your health insurance cards and a photo ID. 2. You must have someone with you to drive you home, as you should not drive a car for 24 hours following anesthesia. Please make arrangements for a responsible adult friend or family member to stay with you for at least the first 24 hours after your surgery. 3. Do not have anything to eat or drink (including water, gum, mints, coffee, juice) after 11:59 PM  07/5. This may not apply to medications prescribed by your physician. (Please note below the special instructions with medications to take the morning of surgery, if applicable.)    4. We recommend you do not drink any alcoholic beverages for 24 hours before and after your surgery. 5. Contact your surgeons office for instructions on the following medications: non-steroidal anti-inflammatory drugs (i.e. Advil, Aleve), vitamins, and supplements. (Some surgeons will want you to stop these medications prior to surgery and others may allow you to take them)   **If you are currently taking Plavix, Coumadin, Aspirin and/or other blood-thinning agents, contact your surgeon for instructions. ** Your surgeon will partner with the physician prescribing these medications to determine if it is safe to stop or if you need to continue taking. Please do not stop taking these medications without instructions from your surgeon.     6. In an effort to help prevent surgical site infection, we ask that you shower with an anti-bacterial soap (i.e. Dial/Safeguard, or the soap provided to you at your preadmission testing appointment) for 3 days prior to and on the morning of surgery, using a fresh towel after each shower. (Please begin this process with fresh bed linens.) Do not apply any lotions, powders, or deodorants after the shower on the day of your procedure. If applicable, please do not shave the operative site for 48 hours prior to surgery. 7. Wear comfortable clothes. Wear glasses instead of contacts. Do not bring any jewelry or money (other than copays or fees as instructed). Do not wear make-up, particularly mascara, the morning of your surgery. Do not wear nail polish, particularly if you are having foot /hand surgery. Wear your hair loose or down, no ponytails, buns, marlon pins or clips. All body piercings must be removed. 8. You should understand that if you do not follow these instructions your surgery may be cancelled. If your physical condition changes (i.e. fever, cold or flu) please contact your surgeon as soon as possible. 9. It is important that you be on time. If a situation occurs where you may be late, or if you have any questions or problems, please call [phone].    10. Your surgery time may be subject to change. You will receive a phone call the day prior to surgery to confirm your arrival time. 11. Pediatric patients: please bring a change of clothes, diapers, bottle/sippy cup, pacifier, etc.      Special Instructions: Take all medications and inhalers, as prescribed, on the morning of surgery with a sip of water EXCEPT: n/a      Insulin Dependent Diabetic patients: Take your diabetic medications as prescribed the day before surgery. Hold all diabetic medications the day of surgery. If you are scheduled to arrive for surgery after 8:00 AM, and your AM blood sugar is >200, please call Ambulatory Surgery. In an effort to improve the efficiency, privacy, and safety for all of our Pre-op patients visitors are not allowed in the Holding area. Once you arrive and are registered your family/visitors will be asked to remain in your vehicle. The Pre-op staff will call you when they are ready for you to enter the building and will explain to you and your family/visitors that the Pre-op phase is beginning. At this time, if your procedure is outpatient, your family member will be asked to stay in their vehicle until the surgery is complete and you are ready for discharge. If you are going to be admitted after your surgery, once you are called to come inside the building, your family will be able to leave the parking lot. At this time the staff will also ask for your designated spokesperson information in the event that the physician or staff need to provide an update or obtain any pertinent information. The designated spokesperson will be notified if the physician needs to speak to family during the pre-operative phase.and/or in the post op phase. I understand a pre-operative phone call will be made to verify my surgery time. In the event that I am not available, I give permission for a message to be left on my answering service and/or with another person?       yes         ___________________      ___________________      ________________  (Signature of Patient)          (Witness)                   (Date and Time)

## 2020-07-02 ENCOUNTER — ANESTHESIA EVENT (OUTPATIENT)
Dept: SURGERY | Age: 60
End: 2020-07-02
Payer: COMMERCIAL

## 2020-07-02 ENCOUNTER — HOSPITAL ENCOUNTER (OUTPATIENT)
Dept: SURGERY | Age: 60
Discharge: HOME | End: 2020-07-02
Attending: ORTHOPAEDIC SURGERY
Payer: COMMERCIAL

## 2020-07-02 ENCOUNTER — HOSPITAL ENCOUNTER (OUTPATIENT)
Dept: PREADMISSION TESTING | Age: 60
Discharge: HOME | End: 2020-07-02
Attending: ORTHOPAEDIC SURGERY
Payer: COMMERCIAL

## 2020-07-02 VITALS
HEART RATE: 74 BPM | DIASTOLIC BLOOD PRESSURE: 65 MMHG | SYSTOLIC BLOOD PRESSURE: 150 MMHG | TEMPERATURE: 98.2 F | OXYGEN SATURATION: 95 %

## 2020-07-02 LAB
ANION GAP SERPL CALC-SCNC: 4 MMOL/L (ref 5–15)
BUN SERPL-MCNC: 20 MG/DL (ref 6–20)
BUN/CREAT SERPL: 19 (ref 12–20)
CALCIUM SERPL-MCNC: 8.3 MG/DL (ref 8.5–10.1)
CHLORIDE SERPL-SCNC: 108 MMOL/L (ref 97–108)
CO2 SERPL-SCNC: 29 MMOL/L (ref 21–32)
CREAT SERPL-MCNC: 1.07 MG/DL (ref 0.55–1.02)
GLUCOSE SERPL-MCNC: 105 MG/DL (ref 65–100)
POTASSIUM SERPL-SCNC: 3.9 MMOL/L (ref 3.5–5.1)
SODIUM SERPL-SCNC: 141 MMOL/L (ref 136–145)

## 2020-07-02 PROCEDURE — 87635 SARS-COV-2 COVID-19 AMP PRB: CPT

## 2020-07-02 PROCEDURE — 36415 COLL VENOUS BLD VENIPUNCTURE: CPT

## 2020-07-02 PROCEDURE — 80048 BASIC METABOLIC PNL TOTAL CA: CPT

## 2020-07-02 RX ORDER — FENTANYL CITRATE 50 UG/ML
25 INJECTION, SOLUTION INTRAMUSCULAR; INTRAVENOUS
Status: CANCELLED | OUTPATIENT
Start: 2020-07-02

## 2020-07-02 RX ORDER — MORPHINE SULFATE 10 MG/ML
2 INJECTION, SOLUTION INTRAMUSCULAR; INTRAVENOUS
Status: CANCELLED | OUTPATIENT
Start: 2020-07-02

## 2020-07-02 RX ORDER — ONDANSETRON 2 MG/ML
4 INJECTION INTRAMUSCULAR; INTRAVENOUS AS NEEDED
Status: CANCELLED | OUTPATIENT
Start: 2020-07-02

## 2020-07-02 RX ORDER — SODIUM CHLORIDE, SODIUM LACTATE, POTASSIUM CHLORIDE, CALCIUM CHLORIDE 600; 310; 30; 20 MG/100ML; MG/100ML; MG/100ML; MG/100ML
25 INJECTION, SOLUTION INTRAVENOUS CONTINUOUS
Status: CANCELLED | OUTPATIENT
Start: 2020-07-02

## 2020-07-02 RX ORDER — SODIUM CHLORIDE 0.9 % (FLUSH) 0.9 %
5-40 SYRINGE (ML) INJECTION EVERY 8 HOURS
Status: CANCELLED | OUTPATIENT
Start: 2020-07-02

## 2020-07-02 RX ORDER — DIPHENHYDRAMINE HYDROCHLORIDE 50 MG/ML
12.5 INJECTION, SOLUTION INTRAMUSCULAR; INTRAVENOUS AS NEEDED
Status: CANCELLED | OUTPATIENT
Start: 2020-07-02 | End: 2020-07-02

## 2020-07-02 RX ORDER — SODIUM CHLORIDE 0.9 % (FLUSH) 0.9 %
5-40 SYRINGE (ML) INJECTION AS NEEDED
Status: CANCELLED | OUTPATIENT
Start: 2020-07-02

## 2020-07-02 RX ORDER — OXYCODONE AND ACETAMINOPHEN 5; 325 MG/1; MG/1
1 TABLET ORAL
Status: CANCELLED | OUTPATIENT
Start: 2020-07-02 | End: 2020-07-03

## 2020-07-02 RX ORDER — HYDROMORPHONE HYDROCHLORIDE 1 MG/ML
.2-.5 INJECTION, SOLUTION INTRAMUSCULAR; INTRAVENOUS; SUBCUTANEOUS ONCE
Status: CANCELLED | OUTPATIENT
Start: 2020-07-02 | End: 2020-07-02

## 2020-07-02 NOTE — PERIOP NOTES
Spoke to Delbert Doshi at Dr Bon Olivera office and verified Vancomycin IV for surgery 07/06/20. Does not want Gentamicin also.

## 2020-07-03 LAB
SARS-COV-2, COV2NT: NOT DETECTED
SOURCE, COVRS: NORMAL
SPECIMEN SOURCE, FCOV2M: NORMAL

## 2020-07-06 ENCOUNTER — HOSPITAL ENCOUNTER (OUTPATIENT)
Age: 60
Setting detail: OUTPATIENT SURGERY
Discharge: HOME | End: 2020-07-06
Attending: ORTHOPAEDIC SURGERY | Admitting: ORTHOPAEDIC SURGERY
Payer: COMMERCIAL

## 2020-07-06 ENCOUNTER — ANESTHESIA (OUTPATIENT)
Dept: SURGERY | Age: 60
End: 2020-07-06
Payer: COMMERCIAL

## 2020-07-06 VITALS
RESPIRATION RATE: 19 BRPM | DIASTOLIC BLOOD PRESSURE: 79 MMHG | SYSTOLIC BLOOD PRESSURE: 135 MMHG | OXYGEN SATURATION: 95 % | BODY MASS INDEX: 43.4 KG/M2 | WEIGHT: 293 LBS | TEMPERATURE: 98 F | HEIGHT: 69 IN | HEART RATE: 66 BPM

## 2020-07-06 PROCEDURE — 77030002916 HC SUT ETHLN J&J -A: Performed by: ORTHOPAEDIC SURGERY

## 2020-07-06 PROCEDURE — 76030000000 HC AMB SURG OR TIME 0.5 TO 1: Performed by: ORTHOPAEDIC SURGERY

## 2020-07-06 PROCEDURE — 76210000035 HC AMBSU PH I REC 1 TO 1.5 HR: Performed by: ORTHOPAEDIC SURGERY

## 2020-07-06 PROCEDURE — 76060000061 HC AMB SURG ANES 0.5 TO 1 HR: Performed by: ORTHOPAEDIC SURGERY

## 2020-07-06 PROCEDURE — 77030040356 HC CORD BPLR FRCP COVD -A: Performed by: ORTHOPAEDIC SURGERY

## 2020-07-06 PROCEDURE — 74011250636 HC RX REV CODE- 250/636: Performed by: NURSE ANESTHETIST, CERTIFIED REGISTERED

## 2020-07-06 PROCEDURE — 77030018836 HC SOL IRR NACL ICUM -A: Performed by: ORTHOPAEDIC SURGERY

## 2020-07-06 PROCEDURE — 74011250636 HC RX REV CODE- 250/636: Performed by: ORTHOPAEDIC SURGERY

## 2020-07-06 PROCEDURE — 76210000046 HC AMBSU PH II REC FIRST 0.5 HR: Performed by: ORTHOPAEDIC SURGERY

## 2020-07-06 PROCEDURE — 77030000032 HC CUF TRNQT ZIMM -B: Performed by: ORTHOPAEDIC SURGERY

## 2020-07-06 PROCEDURE — 74011000250 HC RX REV CODE- 250: Performed by: ORTHOPAEDIC SURGERY

## 2020-07-06 PROCEDURE — 77030021352 HC CBL LD SYS DISP COVD -B: Performed by: ORTHOPAEDIC SURGERY

## 2020-07-06 PROCEDURE — 74011250636 HC RX REV CODE- 250/636: Performed by: ANESTHESIOLOGY

## 2020-07-06 PROCEDURE — 74011250637 HC RX REV CODE- 250/637: Performed by: ORTHOPAEDIC SURGERY

## 2020-07-06 RX ORDER — IBUPROFEN 200 MG
800 TABLET ORAL
Qty: 20 TAB | Refills: 0 | Status: SHIPPED | OUTPATIENT
Start: 2020-07-06 | End: 2020-07-13

## 2020-07-06 RX ORDER — LIDOCAINE HYDROCHLORIDE 10 MG/ML
0.1 INJECTION, SOLUTION EPIDURAL; INFILTRATION; INTRACAUDAL; PERINEURAL AS NEEDED
Status: DISCONTINUED | OUTPATIENT
Start: 2020-07-06 | End: 2020-07-06 | Stop reason: HOSPADM

## 2020-07-06 RX ORDER — BUPIVACAINE HYDROCHLORIDE 2.5 MG/ML
5 INJECTION, SOLUTION EPIDURAL; INFILTRATION; INTRACAUDAL ONCE
Status: COMPLETED | OUTPATIENT
Start: 2020-07-06 | End: 2020-07-06

## 2020-07-06 RX ORDER — LIDOCAINE HYDROCHLORIDE 10 MG/ML
5 INJECTION, SOLUTION EPIDURAL; INFILTRATION; INTRACAUDAL; PERINEURAL ONCE
Status: COMPLETED | OUTPATIENT
Start: 2020-07-06 | End: 2020-07-06

## 2020-07-06 RX ORDER — SODIUM CHLORIDE, SODIUM LACTATE, POTASSIUM CHLORIDE, CALCIUM CHLORIDE 600; 310; 30; 20 MG/100ML; MG/100ML; MG/100ML; MG/100ML
25 INJECTION, SOLUTION INTRAVENOUS CONTINUOUS
Status: DISCONTINUED | OUTPATIENT
Start: 2020-07-06 | End: 2020-07-06 | Stop reason: HOSPADM

## 2020-07-06 RX ORDER — PROPOFOL 10 MG/ML
INJECTION, EMULSION INTRAVENOUS
Status: DISCONTINUED | OUTPATIENT
Start: 2020-07-06 | End: 2020-07-06 | Stop reason: HOSPADM

## 2020-07-06 RX ORDER — SODIUM CHLORIDE 0.9 % (FLUSH) 0.9 %
5-40 SYRINGE (ML) INJECTION EVERY 8 HOURS
Status: DISCONTINUED | OUTPATIENT
Start: 2020-07-06 | End: 2020-07-06 | Stop reason: HOSPADM

## 2020-07-06 RX ORDER — SODIUM CHLORIDE 0.9 % (FLUSH) 0.9 %
5-40 SYRINGE (ML) INJECTION AS NEEDED
Status: DISCONTINUED | OUTPATIENT
Start: 2020-07-06 | End: 2020-07-06 | Stop reason: HOSPADM

## 2020-07-06 RX ORDER — MIDAZOLAM HYDROCHLORIDE 1 MG/ML
INJECTION, SOLUTION INTRAMUSCULAR; INTRAVENOUS AS NEEDED
Status: DISCONTINUED | OUTPATIENT
Start: 2020-07-06 | End: 2020-07-06 | Stop reason: HOSPADM

## 2020-07-06 RX ORDER — PROPOFOL 10 MG/ML
INJECTION, EMULSION INTRAVENOUS AS NEEDED
Status: DISCONTINUED | OUTPATIENT
Start: 2020-07-06 | End: 2020-07-06 | Stop reason: HOSPADM

## 2020-07-06 RX ORDER — VANCOMYCIN/0.9 % SOD CHLORIDE 1.5G/250ML
1500 PLASTIC BAG, INJECTION (ML) INTRAVENOUS ONCE
Status: COMPLETED | OUTPATIENT
Start: 2020-07-06 | End: 2020-07-06

## 2020-07-06 RX ADMIN — PROPOFOL 10 MG: 10 INJECTION, EMULSION INTRAVENOUS at 10:08

## 2020-07-06 RX ADMIN — PROPOFOL INJECTABLE EMULSION 75 MCG/KG/MIN: 10 INJECTION, EMULSION INTRAVENOUS at 09:46

## 2020-07-06 RX ADMIN — PROPOFOL 20 MG: 10 INJECTION, EMULSION INTRAVENOUS at 09:47

## 2020-07-06 RX ADMIN — MIDAZOLAM HYDROCHLORIDE 1 MG: 1 INJECTION, SOLUTION INTRAMUSCULAR; INTRAVENOUS at 09:32

## 2020-07-06 RX ADMIN — PROPOFOL 10 MG: 10 INJECTION, EMULSION INTRAVENOUS at 10:12

## 2020-07-06 RX ADMIN — MIDAZOLAM HYDROCHLORIDE 1 MG: 1 INJECTION, SOLUTION INTRAMUSCULAR; INTRAVENOUS at 09:25

## 2020-07-06 RX ADMIN — VANCOMYCIN HYDROCHLORIDE 1500 MG: 10 INJECTION, POWDER, LYOPHILIZED, FOR SOLUTION INTRAVENOUS at 09:11

## 2020-07-06 RX ADMIN — PROPOFOL 20 MG: 10 INJECTION, EMULSION INTRAVENOUS at 09:58

## 2020-07-06 RX ADMIN — SODIUM CHLORIDE, SODIUM LACTATE, POTASSIUM CHLORIDE, AND CALCIUM CHLORIDE 25 ML/HR: 600; 310; 30; 20 INJECTION, SOLUTION INTRAVENOUS at 09:06

## 2020-07-06 RX ADMIN — PROPOFOL 10 MG: 10 INJECTION, EMULSION INTRAVENOUS at 10:10

## 2020-07-06 RX ADMIN — PROPOFOL 10 MG: 10 INJECTION, EMULSION INTRAVENOUS at 09:48

## 2020-07-06 RX ADMIN — PROPOFOL 10 MG: 10 INJECTION, EMULSION INTRAVENOUS at 10:06

## 2020-07-06 RX ADMIN — PROPOFOL 10 MG: 10 INJECTION, EMULSION INTRAVENOUS at 10:04

## 2020-07-06 RX ADMIN — Medication 3 AMPULE: at 09:07

## 2020-07-06 RX ADMIN — PROPOFOL INJECTABLE EMULSION 100 MCG/KG/MIN: 10 INJECTION, EMULSION INTRAVENOUS at 09:28

## 2020-07-06 NOTE — OP NOTES
PATIENT NAME:  Mauro Griffin    SURGEON:  Slade Sosa MD    DATE OF SURGERY:  7/6/2020    LOCATION: [facility]    PREOPERATIVE DIAGNOSIS:   right carpal tunnel syndrome, right ring finger trigger finger    POSTOPERATIVE DIAGNOSIS:  Same    PROCEDURE:  right Open carpal tunnel decompression, right ring finger trigger release        ANESTHESIA:  Local (1% lidocaine with 0.25% bupivicaine in a 1:1 mixture) with sedation     BLOOD LOSS:  Minimal    TOURNIQUET TIME:  17 min    OPERATIVE INDICATIONS: The patient is a 56 y.o. old female who has developed progressive right carpal tunnel syndrome and ring finger trigger finger, unresponsive to all conservative treatment. Symptoms have failed to respond consistently to conservative treatment such that patient has elected to undergo surgical management. She understands the alternatives to surgery, the nature of this elective procedure, the usual recovery, possible variations in healing, and the potential for shortcomings and complications (including but not exclusive to bleeding, infection, scar tenderness,  weakness, residual numbness, or thenar muscle weakness). DESCRIPTION  OF PROCEDURE: Patient identified correctly in the pre-operative holding area and correct extremity marked. Was then taken stable to the operating room and placed supine with the operative extremity on a hand table. After sedation was administered by the anesthesia team, the right hand and forearm were prepped and draped in a sterile field. A timeout was taken and the operative site was confirmed. Local anesthesia was instilled into the wound. The extremity was then elevated and exsanguinated and an upper arm tourniquet was inflated to 250 mm of mercury. An incision was made in the proximal palm in line with the radial side of the ring finger from the distal wrist crease to Kaplans line.   Dissection was carried through the subcutaneous tissue and the transverse carpal ligament was visualized. This was released under direct visualization first distally followed by proximally and carried up past the wrist wrist crease. A complete decompression was confirmed by direct visualization of the decompressed median nerve as well as palpation. No other synovial pathology was noted. Attention was then turned toward the trigger finger. A small vertical incision was made in the distal palm over the A1 pulley of the ring  finger. Dissection was carried through the subcutaneous tissue and digital nerves were protected. The A1 annular pulley was reelased after which there was no evidence of triggering with flexion and extension of the digit. The flexor tendons were elevated out of the wound and noted to be without adhesions. The tourniquet was released. Hemostasis was confirmed and the wound was copiously irrigated and closed with a interupted nylon sutures. A sterile dressing was then applied leaving the fingers free for range of motion. The patient tolerated the procedure well and was discharged to the recovery area uneventfully.

## 2020-07-06 NOTE — PERIOP NOTES
Permission received to review discharge instructions and discuss private health information with Pedro Pablo Elise .

## 2020-07-06 NOTE — DISCHARGE INSTRUCTIONS
[facility]  Post-operative instructions  For: [facility] first postop appointment should be scheduled with Dr. Jensen Falcon for 2-3 weeks post-op. [address], [address]  [place] Luciano  Phone: [phone]  Hand Therapy Phone: [phone]  Fax: [phone]    Please follow these instructions for a safe and speedy recovery:    1. Surgical Bandage: Leave the bandage in place until 2 weeks after surgery. Please keep it clean and dry. To shower or bathe, apply a plastic bag or GLAD Press'n Seal® plastic wrap around the bandage or simply sponge bathe. After 2 weeks, you can remove the dressing and get incision wet but NO SOAKING. 2. Elevation: Hand swelling is best prevented by keeping your hand elevated above the level of your heart at all times, night and day. The opposite, dangling your hand below your waist, will cause additional pain, swelling, and later stiffness. You can elevate the hand in a sling or by propping it on a pillow at night. Occasionally, we will provide you with a custom-made foam block for elevating the arm. Ice compresses may help but do not rep[lace elevation. Frequently, extreme pain is caused by a tight bandage, which should be loosened. If pain is severe and progressive, call us at [phone] during the day (ask for immediate connection to Dr. Allyssa Chakraborty Team) or during the night (ask for the on-call physician). 3. Medication: You will be provided with an appropriate pain medication (over-the-counter or prescription). Please fill this at a pharmacy promptly so you will have it available when all local anesthetic wears off. Take this to relieve pain as directed on the bottle. Please refrain from driving, drinking alcohol, and making important medical decisions while taking the medication. Please call us if you need something stronger.  Medication changes or refills must be made before 5pm or through your pharmacy. 4. Weight bearing: Do NOT bear any weight on the operative extremity for the first 2 weeks after surgery. After 2 weeks, you have a 5 pound weight lifting restriction. I want to thank you for choosing us for your hand care needs. My staff and I are committed to providing all our customers with the highest quality hand surgery and subsequent hand therapy care as possible. We want your recovery to be comfortable. If you have clinical non-emergent questions about your surgery or other hand conditions please call [phone] and ask for my team. Your call will be returned within 24 hours. DO NOT TAKE SLEEPING MEDICATIONS OR ANTIANXIETY MEDICATIONS THE NIGHT OF ANESTHESIA! CPAP PATIENTS BE SURE TO WEAR MACHINE WHENEVER NAPPING OR SLEEPING! DISCHARGE SUMMARY from Nurse    The following personal items collected during your admission are returned to you:   Dental Appliance: Dental Appliances: None  Vision: Visual Aid: Glasses(PACU)  Hearing Aid:    Jewelry: Jewelry: None  Clothing: Clothing: With patient  Other Valuables: Other Valuables: Eyeglasses, Cane(PACU)  Valuables sent to safe:        PATIENT INSTRUCTIONS:    After General Anesthesia or Intravenous Sedation, for 24 hours or while taking prescription Narcotics:        Someone should be with you for the next 24 hours. For your own safety, a responsible adult must drive you home. · Limit your activities  · Recommended activity: Rest today, up with assistance today. Do not climb stairs or shower unattended for the next 24 hours. · Please start with a soft bland diet and advance as tolerated (no nausea) to regular diet. · If you have a sore throat you should try the following: fluids, warm salt water gargles, or throat lozenges. If it does not improve after several days please follow up with your primary physician.   · Do not drive and operate hazardous machinery  · Do not make important personal or business decisions  · Do  not drink alcoholic beverages  · If you have not urinated within 8 hours after discharge, please contact your surgeon on call. Report the following to your surgeon:  · Excessive pain, swelling, redness or odor of or around the surgical area  · Temperature over 100.5  · Nausea and vomiting lasting longer than 4 hours or if unable to take medications  · Any signs of decreased circulation or nerve impairment to extremity: change in color, persistent  numbness, tingling, coldness or increase pain      · You will receive a Post Operative Call from one of the Recovery Room Nurses on the day after your surgery to check on you. It is very important for us to know how you are recovering after your surgery. If you have an issue or need to speak with someone, please call your surgeon, do not wait for the post operative call. · You may receive an e-mail or letter in the mail from [employer] regarding your experience with us in the Ambulatory Surgery Unit. Your feedback is valuable to us and we appreciate your participation in the survey. · If the above instructions are not adequate or you are having problems after your surgery, call the physician at their office number. · We wish you a speedy recovery ? Patient Education        Learning About Coronavirus [phone])  Coronavirus [phone]): Overview  What is coronavirus ([id number])? The coronavirus disease (COVID-19) is caused by a virus. It is an illness that was first found in [place], [place], in December 2019. It has since spread worldwide. The virus can cause fever, cough, and trouble breathing. In severe cases, it can cause pneumonia and make it hard to breathe without help. It can cause death. Coronaviruses are a large group of viruses. They cause the common cold. They also cause more serious illnesses like Middle East respiratory syndrome (MERS) and severe acute respiratory syndrome (SARS).  COVID-19 is caused by a novel coronavirus. That means it's a new type that has not been seen in people before. This virus spreads person-to-person through droplets from coughing and sneezing. It can also spread when you are close to someone who is infected. And it can spread when you touch something that has the virus on it, such as a doorknob or a tabletop. What can you do to protect yourself from coronavirus (COVID-19)? The best way to protect yourself from getting sick is to:  · Avoid areas where there is an outbreak. · Avoid contact with people who may be infected. · Wash your hands often with soap or alcohol-based hand sanitizers. · Avoid crowds and try to stay at least 6 feet away from other people. · Wash your hands often, especially after you cough or sneeze. Use soap and water, and scrub for at least 20 seconds. If soap and water aren't available, use an alcohol-based hand . · Avoid touching your mouth, nose, and eyes. What can you do to avoid spreading the virus to others? To help avoid spreading the virus to others:  · Cover your mouth with a tissue when you cough or sneeze. Then throw the tissue in the trash. · Use a disinfectant to clean things that you touch often. · Wear a cloth face cover if you have to go to public areas. · Stay home if you are sick or have been exposed to the virus. Don't go to school, work, or public areas. And don't use public transportation, ride-shares, or taxis unless you have no choice. · If you are sick:  ? Leave your home only if you need to get medical care. But call the doctor's office first so they know you're coming. And wear a face cover. ? Wear the face cover whenever you're around other people. It can help stop the spread of the virus when you cough or sneeze. ? Clean and disinfect your home every day. Use household  and disinfectant wipes or sprays. Take special care to clean things that you grab with your hands.  These include doorknobs, remote controls, phones, and handles on your refrigerator and microwave. And don't forget countertops, tabletops, bathrooms, and computer keyboards. When to call for help  [id number] anytime you think you may need emergency care. For example, call if:  · You have severe trouble breathing. (You can't talk at all.)  · You have constant chest pain or pressure. · You are severely dizzy or lightheaded. · You are confused or can't think clearly. · Your face and lips have a blue color. · You pass out (lose consciousness) or are very hard to wake up. Call your doctor now if you develop symptoms such as:  · Shortness of breath. · Fever. · Cough. If you need to get care, call ahead to the doctor's office for instructions before you go. Make sure you wear a face cover to prevent exposing other people to the virus. Where can you get the latest information? The following health organizations are tracking and studying this virus. Their websites contain the most up-to-date information. Mar Dk also learn what to do if you think you may have been exposed to the virus. · U.S. Centers for Disease Control and Prevention (CDC): The CDC provides updated news about the disease and travel advice. The website also tells you how to prevent the spread of infection. www.cdc.gov  · World Health Organization [facility]): WHO offers information about the virus outbreaks. WHO also has travel advice. www.who.int  Current as of: May 8, 2020               Content Version: 12.5  © 2006-2020 Healthwise[employer]. Care instructions adapted under license by [employer] (which disclaims liability or warranty for this information). If you have questions about a medical condition or this instruction, always ask your healthcare professional. [place] [address] any warranty or liability for your use of this information.        [place] THROMBOSIS AND PULMONARY EMBOLUS    SURGICAL PATIENTS  Surgical patients are the #1 risk for DVT and PE. WHAT IS DVT? WHAT IS PE?  DVT is a serious condition where blood clots develop deep in the veins of the legs. PE occurs when a blood clot breaks loose from the wall of a vein and travels to the lungs blocking the pulmonary artery or one of its branches impairing blood flow from the heart, which could result in death. RISK FACTORS   Surgery lasting longer than 45 minutes   History of inflammatory bowel disease   Oral contraceptive or hormone replacement therapy   Immobilization   Varicose veins / swollen legs   Smoking    CHF / Acute MI / Irregular heart beat   Family history of thrombosis   General anesthesia greater than 2 hours   Obesity   Infection of less than one month   Less than 1 month postpartum   COPD / Pneumonia   Arthroscopic surgery   Malignancy / cancer   Spine surgery   Blood abnormalities   Stroke / Paralysis / Coma    SIGNS AND SYMPTOMS OF DEEP VEIN TROMBOSIS   -  ER VISIT  Usually occurs in one leg, above or below the knee   Swelling - one calf or thigh may be larger than the other   Feeling increased warmth in the area of the leg that is swollen or painful   Leg pain, which may increase when standing or walking   Swelling along the vein of the leg   When swollen areas is pressed with a finger, a depression may remain   Tenderness of the leg that may be confined to one area   Change in leg color (bluish or red)    SIGNS AND SYMPTOMS OF PULMONARY EMBOLUS  - 911 CALL   Chest pain that gets worse with deep breath, coughing or chest movement   Coughing up blood   Sweating   Shortness of breath or difficulty breathing   Rapid heart beat   Lightheadedness    HOW TO REDUCE THE POSSIBLE RISK OF DVT   Exercise - simple activities as rotating ankles and wrists, wiggling toes and fingers, tightening and relaxing muscles in calves and thighs promotes circulation while recovering from surgery.  Please do these exercises every hour during waking hours   Take mediation as prescribed by your physician (Lovenox, Coumadin, Aspirin)   Resume your normal activities as soon as your doctor advises you to do so.  Remember, when traveling, to [employer] your legs frequently    PATIENTS WHO BELIEVE THEY MAY BE EXPERIENCING SIGNS AND SYMPTOMS OF DVT OR PE SHOULD SEEK MEDICAL HELP IMMEDIATELY             What to do at Home:      *  Please give a list of your current medications to your Primary Care Provider. *  Please update this list whenever your medications are discontinued, doses are      changed, or new medications (including over-the-counter products) are added. *  Please carry medication information at all times in case of emergency situations. If you have not received your influenza and/or pneumococcal vaccine, please follow up with your primary care physician. The discharge information has been reviewed with the patient and caregiver. The patient and caregiver verbalized understanding.

## 2020-07-06 NOTE — ANESTHESIA PREPROCEDURE EVALUATION
Relevant Problems   No relevant active problems       Anesthetic History   No history of anesthetic complications            Review of Systems / Medical History  Patient summary reviewed, nursing notes reviewed and pertinent labs reviewed    Pulmonary        Sleep apnea: CPAP           Neuro/Psych         Headaches and psychiatric history ( depression)     Cardiovascular    Hypertension        Dysrhythmias : SVT      Exercise tolerance: >4 METS  Comments: 06/18 ECHO= mild cLVH, mod TR & PH, EF 55-60%   GI/Hepatic/Renal     GERD (occasional )           Endo/Other      Hypothyroidism  Morbid obesity     Other Findings   Comments: Sore left neck muscles  Lymphedema left leg         Physical Exam    Airway  Mallampati: I  TM Distance: > 6 cm  Neck ROM: decreased range of motion   Mouth opening: Normal     Cardiovascular    Rhythm: regular  Rate: normal         Dental  No notable dental hx       Pulmonary  Breath sounds clear to auscultation               Abdominal  GI exam deferred       Other Findings            Anesthetic Plan    ASA: 3  Anesthesia type: MAC          Induction: Intravenous  Anesthetic plan and risks discussed with: Patient      May need Supernova

## 2020-07-06 NOTE — PERIOP NOTES
Dorcas Barrett  1960  [id number]    Situation:  Verbal report given from: A. [facility] and LANCE Mayo RN  Procedure: Procedure(s):  RIGHT OPEN CARPAL TUNNEL RELEASE, RIGHT RING FINGER TRIGGER FINGER  RELEASE (MAC W/LOCA)  FINGER TRIGGER RELEASE    Background:    Preoperative diagnosis: CARPAL TUNNERL SYNDROME OF RIGHT WRIST, TRIGGER RING FINGER OF RIGHT HAND    Postoperative diagnosis: CARPAL TUNNERL SYNDROME OF RIGHT WRIST, TRIGGER RING FINGER OF RIGHT HAND    :  Dr. Amarilis Felix    Assistant(s): Circ-1: Harriet Almonte RN  Scrub Tech-1: Latricia SMITH    Specimens: * No specimens in log *    Assessment:  Intra-procedure medications         Anesthesia gave intra-procedure sedation and medications, see anesthesia flow sheet     Intravenous fluids: LR@ KVO     Vital signs stable       Recommendation:    Permission to share finding with  Trace Alarcon

## 2020-07-06 NOTE — ANESTHESIA POSTPROCEDURE EVALUATION
Procedure(s):  RIGHT OPEN CARPAL TUNNEL RELEASE, RIGHT RING FINGER TRIGGER FINGER  RELEASE (MAC W/LOCA)  FINGER TRIGGER RELEASE. MAC    Anesthesia Post Evaluation      Multimodal analgesia: multimodal analgesia used between 6 hours prior to anesthesia start to PACU discharge  Patient location during evaluation: PACU  Patient participation: complete - patient participated  Level of consciousness: awake  Pain score: 0  Airway patency: patent  Anesthetic complications: no  Cardiovascular status: acceptable  Respiratory status: acceptable  Hydration status: acceptable  Post anesthesia nausea and vomiting:  none  Final Post Anesthesia Temperature Assessment:  Normothermia (36.0-37.5 degrees C)      INITIAL Post-op Vital signs:   Vitals Value Taken Time   /63 7/6/2020 11:01 AM   Temp 36.7 °C (98 °F) 7/6/2020 10:40 AM   Pulse 65 7/6/2020 11:30 AM   Resp 16 7/6/2020 11:30 AM   SpO2 92 % 7/6/2020 11:30 AM   Vitals shown include unvalidated device data.

## 2020-07-06 NOTE — H&P
Comes today for follow-up of her multiple digit trigger fingers. Reporting that the right ring finger has been acting up recently as well as the left thumb. Has been worsening over the last 3 weeks. Also reporting right hand and wrist swelling with pain and numbness has been waking her up at night. This is a new issue. Also worsening over the last 3 weeks. Does not affects the small finger. No neck pain. No history of diabetes.     Objective:   Constitutional:  No acute distress. Well nourished. Well developed. Eyes:  Sclera are nonicteric. Respiratory:  No labored breathing. Cardiovascular:  No marked edema. Skin:  No marked skin ulcers. Neurological:  see below  Psychiatric: Alert and oriented x3. Musculoskeletal   Examination of bilateral hands demonstrates that the left thumb and right ring finger there is tenderness to palpation and active triggering. Has a positive Tinel's and positive Durkan's at the right wrist.  Has median nerve paresthesias on the right.     Radiographs:       No imaging obtained      Assessment:      1. Carpal tunnel syndrome of right wrist    2. Trigger ring finger of right hand    3. Trigger finger of left thumb         Plan:   I discussed with the patient the nature of their condition and treatment options. They desires to proceed with an injection due to significant symptoms. Understands that surgery is likely in her future but cannot undergo surgery at this time. Risks, benefits and alternatives are discussed and they consent to proceed. I performed this today under sterile conditions and they tolerated it well. Right wrist splint given for nighttime carpal tunnel protection for this new issue. They will f/u on an as needed basis. Addendum: injections provided only temporary relief of symptoms. Desires surgical management. R/b/a discussed and she consents to proceed. Date of Surgery Update:  Joline Cockayne was seen and examined.   History and physical has been reviewed. The patient has been examined.  There have been no significant clinical changes since the completion of the originally dated History and Physical.    Signed By: Tavo James MD     July 6, 2020 8:39 AM

## 2020-07-06 NOTE — PERIOP NOTES
Patient: Zeke Madrigal MRN: [id number]  SSN: [id number]   YOB: 1960  Age: 56 y.o. Sex: female     Patient is status post Procedure(s):  RIGHT OPEN CARPAL TUNNEL RELEASE, RIGHT RING FINGER TRIGGER FINGER  RELEASE (MAC W/LOCA)  FINGER TRIGGER RELEASE. Surgeon(s) and Role: Rose Vasquez MD - Primary    Local/Dose/Irrigation:  SEE [facility]                  Peripheral IV 07/06/20 Left Arm (Active)   Site Assessment Clean, dry, & intact 7/6/2020  9:06 AM   Phlebitis Assessment 0 7/6/2020  9:06 AM   Infiltration Assessment 0 7/6/2020  9:06 AM   Dressing Status New 7/6/2020  9:06 AM   Dressing Type Transparent;Tape 7/6/2020  9:06 AM   Hub Color/Line Status Blue; Infusing 7/6/2020  9:06 AM                           Dressing/Packing:  Wound Arm Right-Dressing Type: 4 x 4;Cast padding;Xeroform; Other (Comment)(ACE WRAP) (07/06/20 0900)

## 2020-08-29 RX ORDER — FUROSEMIDE 20 MG/1
TABLET ORAL
Qty: 90 TAB | Refills: 0 | Status: SHIPPED | OUTPATIENT
Start: 2020-08-29 | End: 2020-09-29 | Stop reason: SDUPTHER

## 2020-09-06 RX ORDER — VERAPAMIL HYDROCHLORIDE 240 MG/1
TABLET, FILM COATED, EXTENDED RELEASE ORAL
Qty: 90 TAB | Refills: 3 | Status: SHIPPED | OUTPATIENT
Start: 2020-09-06 | End: 2021-09-11

## 2020-09-29 RX ORDER — FUROSEMIDE 20 MG/1
TABLET ORAL
Qty: 90 TAB | Refills: 0 | Status: SHIPPED | OUTPATIENT
Start: 2020-09-29 | End: 2020-10-01 | Stop reason: SDUPTHER

## 2020-10-01 RX ORDER — FUROSEMIDE 20 MG/1
TABLET ORAL
Qty: 90 TAB | Refills: 0 | Status: SHIPPED | OUTPATIENT
Start: 2020-10-01 | End: 2021-04-26

## 2020-10-16 ENCOUNTER — APPOINTMENT (OUTPATIENT)
Dept: CT IMAGING | Age: 60
DRG: 194 | End: 2020-10-16
Attending: EMERGENCY MEDICINE
Payer: MEDICARE

## 2020-10-16 ENCOUNTER — HOSPITAL ENCOUNTER (INPATIENT)
Age: 60
LOS: 2 days | Discharge: HOME | DRG: 194 | End: 2020-10-18
Attending: EMERGENCY MEDICINE | Admitting: INTERNAL MEDICINE
Payer: MEDICARE

## 2020-10-16 DIAGNOSIS — J18.9 COMMUNITY ACQUIRED PNEUMONIA OF LEFT LUNG, UNSPECIFIED PART OF LUNG: Primary | ICD-10-CM

## 2020-10-16 PROBLEM — J15.9 BACTERIAL PNEUMONIA: Status: ACTIVE | Noted: 2020-10-16

## 2020-10-16 LAB
ALBUMIN SERPL-MCNC: 3.2 G/DL (ref 3.5–5)
ALBUMIN/GLOB SERPL: 0.8 {RATIO} (ref 1.1–2.2)
ALP SERPL-CCNC: 80 U/L (ref 45–117)
ALT SERPL-CCNC: 20 U/L (ref 12–78)
ANION GAP SERPL CALC-SCNC: 3 MMOL/L (ref 5–15)
APPEARANCE UR: ABNORMAL
AST SERPL-CCNC: 26 U/L (ref 15–37)
BACTERIA URNS QL MICRO: ABNORMAL /HPF
BASOPHILS # BLD: 0.1 K/UL (ref 0–0.1)
BASOPHILS NFR BLD: 0 % (ref 0–1)
BILIRUB SERPL-MCNC: 0.5 MG/DL (ref 0.2–1)
BILIRUB UR QL: NEGATIVE
BUN SERPL-MCNC: 14 MG/DL (ref 6–20)
BUN/CREAT SERPL: 13 (ref 12–20)
CALCIUM SERPL-MCNC: 8.8 MG/DL (ref 8.5–10.1)
CHLORIDE SERPL-SCNC: 105 MMOL/L (ref 97–108)
CO2 SERPL-SCNC: 30 MMOL/L (ref 21–32)
COLOR UR: ABNORMAL
CREAT SERPL-MCNC: 1.05 MG/DL (ref 0.55–1.02)
DIFFERENTIAL METHOD BLD: ABNORMAL
EOSINOPHIL # BLD: 0.2 K/UL (ref 0–0.4)
EOSINOPHIL NFR BLD: 1 % (ref 0–7)
EPITH CASTS URNS QL MICRO: ABNORMAL /LPF
ERYTHROCYTE [DISTWIDTH] IN BLOOD BY AUTOMATED COUNT: 13.3 % (ref 11.5–14.5)
GLOBULIN SER CALC-MCNC: 4.2 G/DL (ref 2–4)
GLUCOSE SERPL-MCNC: 113 MG/DL (ref 65–100)
GLUCOSE UR STRIP.AUTO-MCNC: NEGATIVE MG/DL
HCT VFR BLD AUTO: 37.4 % (ref 35–47)
HGB BLD-MCNC: 11.4 G/DL (ref 11.5–16)
HGB UR QL STRIP: NEGATIVE
IMM GRANULOCYTES # BLD AUTO: 0.1 K/UL (ref 0–0.04)
IMM GRANULOCYTES NFR BLD AUTO: 0 % (ref 0–0.5)
KETONES UR QL STRIP.AUTO: NEGATIVE MG/DL
LACTATE SERPL-SCNC: 0.6 MMOL/L (ref 0.4–2)
LEUKOCYTE ESTERASE UR QL STRIP.AUTO: ABNORMAL
LYMPHOCYTES # BLD: 2.5 K/UL (ref 0.8–3.5)
LYMPHOCYTES NFR BLD: 20 % (ref 12–49)
MCH RBC QN AUTO: 29.6 PG (ref 26–34)
MCHC RBC AUTO-ENTMCNC: 30.5 G/DL (ref 30–36.5)
MCV RBC AUTO: 97.1 FL (ref 80–99)
MONOCYTES # BLD: 1.3 K/UL (ref 0–1)
MONOCYTES NFR BLD: 11 % (ref 5–13)
NEUTS SEG # BLD: 8.3 K/UL (ref 1.8–8)
NEUTS SEG NFR BLD: 67 % (ref 32–75)
NITRITE UR QL STRIP.AUTO: POSITIVE
NRBC # BLD: 0 K/UL (ref 0–0.01)
NRBC BLD-RTO: 0 PER 100 WBC
PH UR STRIP: 6.5 [PH] (ref 5–8)
PLATELET # BLD AUTO: 142 K/UL (ref 150–400)
PMV BLD AUTO: 11.8 FL (ref 8.9–12.9)
POTASSIUM SERPL-SCNC: 3.6 MMOL/L (ref 3.5–5.1)
PROT SERPL-MCNC: 7.4 G/DL (ref 6.4–8.2)
PROT UR STRIP-MCNC: 30 MG/DL
RBC # BLD AUTO: 3.85 M/UL (ref 3.8–5.2)
RBC #/AREA URNS HPF: ABNORMAL /HPF (ref 0–5)
SODIUM SERPL-SCNC: 138 MMOL/L (ref 136–145)
SP GR UR REFRACTOMETRY: 1.02 (ref 1–1.03)
UR CULT HOLD, URHOLD: NORMAL
UROBILINOGEN UR QL STRIP.AUTO: 2 EU/DL (ref 0.2–1)
WBC # BLD AUTO: 12.5 K/UL (ref 3.6–11)
WBC URNS QL MICRO: ABNORMAL /HPF (ref 0–4)

## 2020-10-16 PROCEDURE — 71275 CT ANGIOGRAPHY CHEST: CPT

## 2020-10-16 PROCEDURE — 74011000258 HC RX REV CODE- 258: Performed by: RADIOLOGY

## 2020-10-16 PROCEDURE — 36415 COLL VENOUS BLD VENIPUNCTURE: CPT

## 2020-10-16 PROCEDURE — 74011250637 HC RX REV CODE- 250/637: Performed by: EMERGENCY MEDICINE

## 2020-10-16 PROCEDURE — 87040 BLOOD CULTURE FOR BACTERIA: CPT

## 2020-10-16 PROCEDURE — 85730 THROMBOPLASTIN TIME PARTIAL: CPT

## 2020-10-16 PROCEDURE — 85610 PROTHROMBIN TIME: CPT

## 2020-10-16 PROCEDURE — 80053 COMPREHEN METABOLIC PANEL: CPT

## 2020-10-16 PROCEDURE — 87635 SARS-COV-2 COVID-19 AMP PRB: CPT

## 2020-10-16 PROCEDURE — 85379 FIBRIN DEGRADATION QUANT: CPT

## 2020-10-16 PROCEDURE — 86900 BLOOD TYPING SEROLOGIC ABO: CPT

## 2020-10-16 PROCEDURE — 65660000000 HC RM CCU STEPDOWN

## 2020-10-16 PROCEDURE — 83605 ASSAY OF LACTIC ACID: CPT

## 2020-10-16 PROCEDURE — 96367 TX/PROPH/DG ADDL SEQ IV INF: CPT

## 2020-10-16 PROCEDURE — 84145 PROCALCITONIN (PCT): CPT

## 2020-10-16 PROCEDURE — 99285 EMERGENCY DEPT VISIT HI MDM: CPT

## 2020-10-16 PROCEDURE — 74011000636 HC RX REV CODE- 636: Performed by: RADIOLOGY

## 2020-10-16 PROCEDURE — 85025 COMPLETE CBC W/AUTO DIFF WBC: CPT

## 2020-10-16 PROCEDURE — 85384 FIBRINOGEN ACTIVITY: CPT

## 2020-10-16 PROCEDURE — 81001 URINALYSIS AUTO W/SCOPE: CPT

## 2020-10-16 PROCEDURE — 74011250636 HC RX REV CODE- 250/636: Performed by: EMERGENCY MEDICINE

## 2020-10-16 PROCEDURE — 96365 THER/PROPH/DIAG IV INF INIT: CPT

## 2020-10-16 RX ORDER — ACETAMINOPHEN 325 MG/1
650 TABLET ORAL
Status: DISCONTINUED | OUTPATIENT
Start: 2020-10-16 | End: 2020-10-18 | Stop reason: HOSPADM

## 2020-10-16 RX ORDER — SODIUM CHLORIDE 0.9 % (FLUSH) 0.9 %
10 SYRINGE (ML) INJECTION
Status: COMPLETED | OUTPATIENT
Start: 2020-10-16 | End: 2020-10-16

## 2020-10-16 RX ORDER — ACETAMINOPHEN 325 MG/1
975 TABLET ORAL
Status: COMPLETED | OUTPATIENT
Start: 2020-10-16 | End: 2020-10-16

## 2020-10-16 RX ORDER — LEVOFLOXACIN 5 MG/ML
750 INJECTION, SOLUTION INTRAVENOUS
Status: COMPLETED | OUTPATIENT
Start: 2020-10-16 | End: 2020-10-16

## 2020-10-16 RX ADMIN — SODIUM CHLORIDE 100 ML: 900 INJECTION, SOLUTION INTRAVENOUS at 19:05

## 2020-10-16 RX ADMIN — LEVOFLOXACIN 750 MG: 5 INJECTION, SOLUTION INTRAVENOUS at 19:29

## 2020-10-16 RX ADMIN — ACETAMINOPHEN 975 MG: 325 TABLET ORAL at 17:24

## 2020-10-16 RX ADMIN — Medication 10 ML: at 19:05

## 2020-10-16 RX ADMIN — IOPAMIDOL 80 ML: 755 INJECTION, SOLUTION INTRAVENOUS at 17:00

## 2020-10-16 NOTE — ED TRIAGE NOTES
Seamus comes to the ER from [facility] c/o cough x3 days and headache.  Also c/o L shoulder pain    Denies SOB,fever

## 2020-10-16 NOTE — ED PROVIDER NOTES
This is a 75-year-old female with a history of hypertension, lymphedema, morbid obesity and obstructive sleep apnea. She also has a history of PSVT and gallbladder disease. She presents today with 3 to 4 days history of cough and some shortness of breath. The cough is generating with some dark phlegm. She has been exposed to a grandson and  who have had similar symptoms. The  has pneumonia and was tested negative for COVID last week. [facility] here because she had a white count of 14,000 and an x-ray suggesting multifocal pneumonia. The patient does complain of some mild discomfort in her right shoulder. Otherwise she has no acute complaint. There is been no nausea or vomiting. She has had no abdominal pain and no GI or  symptoms.            Past Medical History:   Diagnosis Date    Cholelithiasis     GERD (gastroesophageal reflux disease)     High cholesterol     Hypertension     Lymphedema of left leg     Migraines     Morbid obesity (HCC)     KEISHA (obstructive sleep apnea)     Uses CPAP    PSVT (paroxysmal supraventricular tachycardia) (HCC)     Paroxysmal Supraventricular Tachycardia    Psychiatric disorder     Depression    Unspecified adverse effect of anesthesia May 2006    Postoperative hypoxemia, treated w/ O2 & IV Diuretics       Past Surgical History:   Procedure Laterality Date    DILATION AND CURETTAGE      HX  SECTION      HX CHOLECYSTECTOMY      HX CYST INCISION AND DRAINAGE Right 2014    HX DILATION AND CURETTAGE  May 2006    HX GYN  2008    Uterine Ablation    HX TONSIL AND ADENOIDECTOMY      REMOVAL GALLBLADDER           Family History:   Problem Relation Age of Onset    Cancer Mother         Lung or breast, patient unsure    Heart Attack Father     Diabetes Sister        Social History     Socioeconomic History    Marital status:      Spouse name: Not on file    Number of children: Not on file    Years of education: Not on file    Highest education level: Not on file   Occupational History    Not on file   Social Needs    Financial resource strain: Not on file    Food insecurity     Worry: Not on file     Inability: Not on file    Transportation needs     Medical: Not on file     Non-medical: Not on file   Tobacco Use    Smoking status: Never Smoker    Smokeless tobacco: Never Used   Substance and Sexual Activity    Alcohol use: Yes     Alcohol/week: 1.0 standard drinks     Types: 1 Glasses of wine per week     Comment: seldom    Drug use: Yes     Types: Prescription, OTC    Sexual activity: Yes     Partners: Male     Birth control/protection: None   Lifestyle    Physical activity     Days per week: Not on file     Minutes per session: Not on file    Stress: Not on file   Relationships    Social connections     Talks on phone: Not on file     Gets together: Not on file     Attends Buddhism service: Not on file     Active member of club or organization: Not on file     Attends meetings of clubs or organizations: Not on file     Relationship status: Not on file    Intimate partner violence     Fear of current or ex partner: Not on file     Emotionally abused: Not on file     Physically abused: Not on file     Forced sexual activity: Not on file   Other Topics Concern    Not on file   Social History Narrative    Not on file         ALLERGIES: Codeine; Lipitor [atorvastatin]; and Pcn [penicillins]    Review of Systems   Constitutional: Negative for activity change, appetite change and fatigue. HENT: Negative for ear pain, facial swelling, sore throat and trouble swallowing. Eyes: Negative for pain, discharge and visual disturbance. Respiratory: Positive for cough and shortness of breath. Negative for chest tightness and wheezing. Cardiovascular: Negative for chest pain and palpitations. Gastrointestinal: Negative for abdominal pain, blood in stool, nausea and vomiting. Genitourinary: Negative for difficulty urinating, flank pain and hematuria. Musculoskeletal: Negative for arthralgias, joint swelling, myalgias and neck pain. Pain in the right shoulder. Skin: Negative for color change and rash. Neurological: Negative for dizziness, weakness, numbness and headaches. Hematological: Negative for adenopathy. Does not bruise/bleed easily. Psychiatric/Behavioral: Negative for behavioral problems, confusion and sleep disturbance. All other systems reviewed and are negative. Vitals:    10/16/20 1440   BP: (!) 170/78   Pulse: 93   Resp: 23   Temp: 98.4 °F (36.9 °C)   SpO2: 92%            Physical Exam  Vitals signs and nursing note reviewed. Constitutional:       General: She is not in acute distress. Appearance: She is well-developed. She is obese. She is not ill-appearing. Comments: Patient saturations on room air are at 92%. She does not appear toxic or particularly ill. HENT:      Head: Normocephalic and atraumatic. Nose: Nose normal.   Eyes:      General: No scleral icterus. Conjunctiva/sclera: Conjunctivae normal.      Pupils: Pupils are equal, round, and reactive to light. Neck:      Musculoskeletal: Normal range of motion and neck supple. Thyroid: No thyromegaly. Vascular: No JVD. Trachea: No tracheal deviation. Comments: No carotid bruits noted. Cardiovascular:      Rate and Rhythm: Normal rate and regular rhythm. Heart sounds: Normal heart sounds. No murmur. No friction rub. No gallop. Pulmonary:      Effort: Pulmonary effort is normal. No respiratory distress. Breath sounds: Normal breath sounds. No wheezing or rales. Chest:      Chest wall: No tenderness. Abdominal:      General: Bowel sounds are normal. There is no distension. Palpations: Abdomen is soft. There is no mass. Tenderness: There is no abdominal tenderness. There is no guarding or rebound.    Musculoskeletal: Normal range of motion. General: No tenderness. Right lower leg: Edema present. Left lower leg: Edema present. Comments: Edema is consistent with lymphedema. Lymphadenopathy:      Cervical: No cervical adenopathy. Skin:     General: Skin is warm and dry. Findings: No erythema or rash. Neurological:      Mental Status: She is alert and oriented to person, place, and time. Cranial Nerves: No cranial nerve deficit. Coordination: Coordination normal.      Deep Tendon Reflexes: Reflexes are normal and symmetric. Psychiatric:         Behavior: Behavior normal.         Thought Content: Thought content normal.         Judgment: Judgment normal.          MDM  Number of Diagnoses or Management Options  Community acquired pneumonia of left lung, unspecified part of lung: new and requires workup     Amount and/or Complexity of Data Reviewed  Clinical lab tests: ordered and reviewed  Tests in the radiology section of CPT®: ordered and reviewed  Decide to obtain previous medical records or to obtain history from someone other than the patient: yes  Discuss the patient with other providers: yes  Independent visualization of images, tracings, or specimens: yes    Risk of Complications, Morbidity, and/or Mortality  Presenting problems: high  Diagnostic procedures: high  Management options: high    Patient Progress  Patient progress: stable         Procedures    Given the patient's history of chills and an x-ray suggest some multilobe involvement, a CT scan of the chest is ordered. Blood cultures and lactate are also ordered. 6:44 PM  Still awaiting CT report       Perfect Serve Consult for Admission  7:26 PM    ED Room Number: ER15/15  Patient Name and age:  Andrew Bucyrus 56 y.o.  female  Working Diagnosis:   1.  Community acquired pneumonia of left lung, unspecified part of lung        COVID-19 Suspicion:  yes  Sepsis present:  yes  Reassessment needed: yes  Code Status:  Full Code  Readmission: no  Isolation Requirements:  yes  Recommended Level of Care:  med/surg  Department:[facility] - [phone]  Other:

## 2020-10-17 LAB
ABO + RH BLD: NORMAL
ALBUMIN SERPL-MCNC: 2.9 G/DL (ref 3.5–5)
ALBUMIN/GLOB SERPL: 0.7 {RATIO} (ref 1.1–2.2)
ALP SERPL-CCNC: 78 U/L (ref 45–117)
ALT SERPL-CCNC: 18 U/L (ref 12–78)
ANION GAP SERPL CALC-SCNC: 5 MMOL/L (ref 5–15)
APTT PPP: 29.9 SEC (ref 22.1–32)
AST SERPL-CCNC: 22 U/L (ref 15–37)
BASOPHILS # BLD: 0.1 K/UL (ref 0–0.1)
BASOPHILS NFR BLD: 1 % (ref 0–1)
BILIRUB SERPL-MCNC: 0.6 MG/DL (ref 0.2–1)
BLOOD GROUP ANTIBODIES SERPL: NORMAL
BNP SERPL-MCNC: 423 PG/ML
BUN SERPL-MCNC: 14 MG/DL (ref 6–20)
BUN/CREAT SERPL: 14 (ref 12–20)
CALCIUM SERPL-MCNC: 8.6 MG/DL (ref 8.5–10.1)
CHLORIDE SERPL-SCNC: 104 MMOL/L (ref 97–108)
CHOLEST SERPL-MCNC: 146 MG/DL
CO2 SERPL-SCNC: 30 MMOL/L (ref 21–32)
CREAT SERPL-MCNC: 1.01 MG/DL (ref 0.55–1.02)
CRP SERPL-MCNC: 16.1 MG/DL (ref 0–0.6)
D DIMER PPP FEU-MCNC: 1 MG/L FEU (ref 0–0.65)
DIFFERENTIAL METHOD BLD: ABNORMAL
EOSINOPHIL # BLD: 0.2 K/UL (ref 0–0.4)
EOSINOPHIL NFR BLD: 2 % (ref 0–7)
ERYTHROCYTE [DISTWIDTH] IN BLOOD BY AUTOMATED COUNT: 13.3 % (ref 11.5–14.5)
FERRITIN SERPL-MCNC: 148 NG/ML (ref 26–388)
FIBRINOGEN PPP-MCNC: 729 MG/DL (ref 200–475)
GLOBULIN SER CALC-MCNC: 4.2 G/DL (ref 2–4)
GLUCOSE SERPL-MCNC: 134 MG/DL (ref 65–100)
HCT VFR BLD AUTO: 35.6 % (ref 35–47)
HDLC SERPL-MCNC: 53 MG/DL
HDLC SERPL: 2.8 {RATIO} (ref 0–5)
HEALTH STATUS, XMCV2T: NORMAL
HGB BLD-MCNC: 11.1 G/DL (ref 11.5–16)
IMM GRANULOCYTES # BLD AUTO: 0 K/UL (ref 0–0.04)
IMM GRANULOCYTES NFR BLD AUTO: 0 % (ref 0–0.5)
INR PPP: 1.2 (ref 0.9–1.1)
LACTATE SERPL-SCNC: 1.6 MMOL/L (ref 0.4–2)
LDH SERPL L TO P-CCNC: 219 U/L (ref 81–246)
LDLC SERPL CALC-MCNC: 80.8 MG/DL (ref 0–100)
LIPASE SERPL-CCNC: 39 U/L (ref 73–393)
LIPID PROFILE,FLP: NORMAL
LYMPHOCYTES # BLD: 2.1 K/UL (ref 0.8–3.5)
LYMPHOCYTES NFR BLD: 20 % (ref 12–49)
MAGNESIUM SERPL-MCNC: 1.9 MG/DL (ref 1.6–2.4)
MCH RBC QN AUTO: 30.1 PG (ref 26–34)
MCHC RBC AUTO-ENTMCNC: 31.2 G/DL (ref 30–36.5)
MCV RBC AUTO: 96.5 FL (ref 80–99)
MONOCYTES # BLD: 1.2 K/UL (ref 0–1)
MONOCYTES NFR BLD: 11 % (ref 5–13)
NEUTS SEG # BLD: 7.2 K/UL (ref 1.8–8)
NEUTS SEG NFR BLD: 66 % (ref 32–75)
NRBC # BLD: 0 K/UL (ref 0–0.01)
NRBC BLD-RTO: 0 PER 100 WBC
PHOSPHATE SERPL-MCNC: 2.5 MG/DL (ref 2.6–4.7)
PLATELET # BLD AUTO: 146 K/UL (ref 150–400)
PMV BLD AUTO: 11.7 FL (ref 8.9–12.9)
POTASSIUM SERPL-SCNC: 4 MMOL/L (ref 3.5–5.1)
PROCALCITONIN SERPL-MCNC: 0.27 NG/ML
PROT SERPL-MCNC: 7.1 G/DL (ref 6.4–8.2)
PROTHROMBIN TIME: 12.3 SEC (ref 9–11.1)
RBC # BLD AUTO: 3.69 M/UL (ref 3.8–5.2)
SARS-COV-2, COV2: NOT DETECTED
SODIUM SERPL-SCNC: 139 MMOL/L (ref 136–145)
SOURCE, COVRS: NORMAL
SPECIMEN EXP DATE BLD: NORMAL
SPECIMEN SOURCE, FCOV2M: NORMAL
SPECIMEN TYPE, XMCV1T: NORMAL
THERAPEUTIC RANGE,PTTT: NORMAL SECS (ref 58–77)
TRIGL SERPL-MCNC: 61 MG/DL (ref ?–150)
TROPONIN I SERPL-MCNC: <0.05 NG/ML
TSH SERPL DL<=0.05 MIU/L-ACNC: 3.82 UIU/ML (ref 0.36–3.74)
UR CULT HOLD, URHOLD: NORMAL
VLDLC SERPL CALC-MCNC: 12.2 MG/DL
WBC # BLD AUTO: 10.8 K/UL (ref 3.6–11)

## 2020-10-17 PROCEDURE — 77010033678 HC OXYGEN DAILY

## 2020-10-17 PROCEDURE — 65660000000 HC RM CCU STEPDOWN

## 2020-10-17 PROCEDURE — 84443 ASSAY THYROID STIM HORMONE: CPT

## 2020-10-17 PROCEDURE — 83615 LACTATE (LD) (LDH) ENZYME: CPT

## 2020-10-17 PROCEDURE — 74011250637 HC RX REV CODE- 250/637: Performed by: INTERNAL MEDICINE

## 2020-10-17 PROCEDURE — 83880 ASSAY OF NATRIURETIC PEPTIDE: CPT

## 2020-10-17 PROCEDURE — 94762 N-INVAS EAR/PLS OXIMTRY CONT: CPT

## 2020-10-17 PROCEDURE — 36415 COLL VENOUS BLD VENIPUNCTURE: CPT

## 2020-10-17 PROCEDURE — 83735 ASSAY OF MAGNESIUM: CPT

## 2020-10-17 PROCEDURE — 85025 COMPLETE CBC W/AUTO DIFF WBC: CPT

## 2020-10-17 PROCEDURE — 80061 LIPID PANEL: CPT

## 2020-10-17 PROCEDURE — 80053 COMPREHEN METABOLIC PANEL: CPT

## 2020-10-17 PROCEDURE — 83605 ASSAY OF LACTIC ACID: CPT

## 2020-10-17 PROCEDURE — 87449 NOS EACH ORGANISM AG IA: CPT

## 2020-10-17 PROCEDURE — 84484 ASSAY OF TROPONIN QUANT: CPT

## 2020-10-17 PROCEDURE — 82728 ASSAY OF FERRITIN: CPT

## 2020-10-17 PROCEDURE — 84100 ASSAY OF PHOSPHORUS: CPT

## 2020-10-17 PROCEDURE — 83690 ASSAY OF LIPASE: CPT

## 2020-10-17 PROCEDURE — 86140 C-REACTIVE PROTEIN: CPT

## 2020-10-17 PROCEDURE — 74011250636 HC RX REV CODE- 250/636: Performed by: INTERNAL MEDICINE

## 2020-10-17 PROCEDURE — 2709999900 HC NON-CHARGEABLE SUPPLY

## 2020-10-17 RX ORDER — SODIUM CHLORIDE 0.9 % (FLUSH) 0.9 %
5-40 SYRINGE (ML) INJECTION AS NEEDED
Status: DISCONTINUED | OUTPATIENT
Start: 2020-10-17 | End: 2020-10-18 | Stop reason: HOSPADM

## 2020-10-17 RX ORDER — LEVOFLOXACIN 5 MG/ML
750 INJECTION, SOLUTION INTRAVENOUS EVERY 24 HOURS
Status: DISCONTINUED | OUTPATIENT
Start: 2020-10-17 | End: 2020-10-18 | Stop reason: HOSPADM

## 2020-10-17 RX ORDER — GABAPENTIN 400 MG/1
400 CAPSULE ORAL 2 TIMES DAILY
Status: DISCONTINUED | OUTPATIENT
Start: 2020-10-17 | End: 2020-10-18 | Stop reason: HOSPADM

## 2020-10-17 RX ORDER — ROSUVASTATIN CALCIUM 10 MG/1
5 TABLET, COATED ORAL
Status: DISCONTINUED | OUTPATIENT
Start: 2020-10-17 | End: 2020-10-18 | Stop reason: HOSPADM

## 2020-10-17 RX ORDER — DULOXETIN HYDROCHLORIDE 60 MG/1
120 CAPSULE, DELAYED RELEASE ORAL DAILY
Status: DISCONTINUED | OUTPATIENT
Start: 2020-10-17 | End: 2020-10-18 | Stop reason: HOSPADM

## 2020-10-17 RX ORDER — SODIUM CHLORIDE 0.9 % (FLUSH) 0.9 %
5-40 SYRINGE (ML) INJECTION EVERY 8 HOURS
Status: DISCONTINUED | OUTPATIENT
Start: 2020-10-17 | End: 2020-10-18 | Stop reason: HOSPADM

## 2020-10-17 RX ORDER — HEPARIN SODIUM 5000 [USP'U]/ML
5000 INJECTION, SOLUTION INTRAVENOUS; SUBCUTANEOUS EVERY 8 HOURS
Status: DISCONTINUED | OUTPATIENT
Start: 2020-10-17 | End: 2020-10-18 | Stop reason: HOSPADM

## 2020-10-17 RX ORDER — VERAPAMIL HYDROCHLORIDE 240 MG/1
240 TABLET, FILM COATED, EXTENDED RELEASE ORAL
Status: DISCONTINUED | OUTPATIENT
Start: 2020-10-17 | End: 2020-10-18 | Stop reason: HOSPADM

## 2020-10-17 RX ORDER — FLECAINIDE ACETATE 100 MG/1
100 TABLET ORAL DAILY
Status: DISCONTINUED | OUTPATIENT
Start: 2020-10-17 | End: 2020-10-18 | Stop reason: HOSPADM

## 2020-10-17 RX ORDER — FUROSEMIDE 20 MG/1
20 TABLET ORAL DAILY
Status: DISCONTINUED | OUTPATIENT
Start: 2020-10-17 | End: 2020-10-18 | Stop reason: HOSPADM

## 2020-10-17 RX ORDER — BUPROPION HYDROCHLORIDE 150 MG/1
150 TABLET, EXTENDED RELEASE ORAL 2 TIMES DAILY
Status: DISCONTINUED | OUTPATIENT
Start: 2020-10-17 | End: 2020-10-18 | Stop reason: HOSPADM

## 2020-10-17 RX ORDER — OXYBUTYNIN CHLORIDE 5 MG/1
15 TABLET, EXTENDED RELEASE ORAL DAILY
Status: DISCONTINUED | OUTPATIENT
Start: 2020-10-17 | End: 2020-10-18 | Stop reason: HOSPADM

## 2020-10-17 RX ORDER — GUAIFENESIN 100 MG/5ML
81 LIQUID (ML) ORAL DAILY
Status: DISCONTINUED | OUTPATIENT
Start: 2020-10-17 | End: 2020-10-18 | Stop reason: HOSPADM

## 2020-10-17 RX ORDER — POLYETHYLENE GLYCOL 3350 17 G/17G
17 POWDER, FOR SOLUTION ORAL DAILY PRN
Status: DISCONTINUED | OUTPATIENT
Start: 2020-10-17 | End: 2020-10-18 | Stop reason: HOSPADM

## 2020-10-17 RX ORDER — LOSARTAN POTASSIUM 50 MG/1
50 TABLET ORAL DAILY
Status: DISCONTINUED | OUTPATIENT
Start: 2020-10-17 | End: 2020-10-18 | Stop reason: HOSPADM

## 2020-10-17 RX ADMIN — ROSUVASTATIN 5 MG: 10 TABLET, FILM COATED ORAL at 22:20

## 2020-10-17 RX ADMIN — LEVOFLOXACIN 750 MG: 5 INJECTION, SOLUTION INTRAVENOUS at 18:46

## 2020-10-17 RX ADMIN — ASPIRIN 81 MG CHEWABLE TABLET 81 MG: 81 TABLET CHEWABLE at 09:18

## 2020-10-17 RX ADMIN — BUPROPION HYDROCHLORIDE 150 MG: 150 TABLET, EXTENDED RELEASE ORAL at 17:04

## 2020-10-17 RX ADMIN — FUROSEMIDE 20 MG: 20 TABLET ORAL at 09:18

## 2020-10-17 RX ADMIN — ACETAMINOPHEN 650 MG: 325 TABLET ORAL at 00:44

## 2020-10-17 RX ADMIN — Medication 10 ML: at 06:59

## 2020-10-17 RX ADMIN — Medication 10 ML: at 22:21

## 2020-10-17 RX ADMIN — OXYBUTYNIN CHLORIDE 15 MG: 5 TABLET, EXTENDED RELEASE ORAL at 09:19

## 2020-10-17 RX ADMIN — HEPARIN SODIUM 5000 UNITS: 5000 INJECTION INTRAVENOUS; SUBCUTANEOUS at 17:04

## 2020-10-17 RX ADMIN — HEPARIN SODIUM 5000 UNITS: 5000 INJECTION INTRAVENOUS; SUBCUTANEOUS at 09:21

## 2020-10-17 RX ADMIN — ROSUVASTATIN 5 MG: 10 TABLET, FILM COATED ORAL at 00:59

## 2020-10-17 RX ADMIN — Medication 10 ML: at 15:02

## 2020-10-17 RX ADMIN — GABAPENTIN 400 MG: 400 CAPSULE ORAL at 17:04

## 2020-10-17 RX ADMIN — HEPARIN SODIUM 5000 UNITS: 5000 INJECTION INTRAVENOUS; SUBCUTANEOUS at 00:59

## 2020-10-17 RX ADMIN — VERAPAMIL HYDROCHLORIDE 240 MG: 240 TABLET, FILM COATED, EXTENDED RELEASE ORAL at 09:18

## 2020-10-17 RX ADMIN — BUPROPION HYDROCHLORIDE 150 MG: 150 TABLET, EXTENDED RELEASE ORAL at 09:18

## 2020-10-17 RX ADMIN — GABAPENTIN 400 MG: 400 CAPSULE ORAL at 09:18

## 2020-10-17 RX ADMIN — FLECAINIDE ACETATE 100 MG: 100 TABLET ORAL at 09:18

## 2020-10-17 RX ADMIN — LOSARTAN POTASSIUM 50 MG: 50 TABLET, FILM COATED ORAL at 09:19

## 2020-10-17 RX ADMIN — Medication 10 ML: at 01:00

## 2020-10-17 RX ADMIN — Medication 1 CAPSULE: at 09:19

## 2020-10-17 RX ADMIN — DULOXETINE 120 MG: 60 CAPSULE, DELAYED RELEASE ORAL at 09:20

## 2020-10-17 NOTE — H&P
[address]  HISTORY AND PHYSICAL    Name:  Kamari Easley  MR#:  [id number]  :  1960  ACCOUNT #:  [de-identified]  ADMIT DATE:  10/16/2020      The patient was seen, evaluated, and admitted by me on 10/16/2020. PRIMARY CARE PHYSICIAN:  Liang Garsia MD    SOURCE OF INFORMATION:  The patient. CHIEF COMPLAINT:  Cough and shortness of breath. HISTORY OF PRESENT ILLNESS:  This is a 70-year-old woman with a past medical history significant for hypertension, dyslipidemia, obstructive sleep apnea, hypothyroidism, morbid obesity, depression, paroxysmal supraventricular tachycardia, bilateral lower extremity lymphedema, who was in her usual state of health until about 3 days ago when the patient developed cough and shortness of breath. The cough is productive of dark sputum. The patient's spouse and grandson had similar symptoms. She stated that her  was diagnosed with pneumonia, but he tested negative for COVID-19 virus infection last week. No associated fever, rigors, or chills. The shortness of breath is with exertion. No associated chest pain. Because of the worsening symptoms, the patient went to [facility]. She was sent from the [facility] to the emergency room, because her chest x-ray shows evidence of pneumonia. When the patient arrived at the emergency room, CTA of the chest was performed. The CTA shows evidence of pneumonia. The patient was started on antibiotics and was referred to the hospitalist service for evaluation for admission. She was last admitted to this hospital from 2019 to 2019. The patient was admitted and treated for pneumonia. PAST MEDICAL HISTORY:  Hypertension, dyslipidemia, obstructive sleep apnea, hypothyroidism, morbid obesity, depression, paroxysmal supraventricular tachycardia, bilateral lower extremity lymphedema. ALLERGIES:  THE PATIENT IS ALLERGIC TO CODEINE, LIPITOR, AND PENICILLIN. MEDICATIONS:  1. Aspirin 81 mg daily. 2.  Wellbutrin  mg daily in the evening. 3.  Cymbalta 120 mg daily. 4.  Tambocor 100 mg daily. 5.  Lasix 20 mg daily. 6.  Neurontin 400 mg twice daily. 7.  Losartan 50 mg daily. 8.  Ditropan XL 15 mg daily. 9.  Crestor 5 mg daily. 10.  Calan  mg daily. FAMILY HISTORY:  This was reviewed. Her mother had cancer, type of cancer is not known. Her father had heart disease. PAST SURGICAL HISTORY:  This is significant for cholecystectomy,  section, uterine ablation. SOCIAL HISTORY:  No history of tobacco abuse. The patient admits to social consumption of alcohol. REVIEW OF SYSTEMS:  HEAD, EYES, EARS, NOSE, AND THROAT:  No headache, no dizziness, no blurring of vision, no photophobia. RESPIRATORY SYSTEM:  This is positive for shortness of breath and cough. No hemoptysis. CARDIOVASCULAR SYSTEM:  No chest pain, no orthopnea, no palpitation. GASTROINTESTINAL SYSTEM:  No nausea or vomiting. No diarrhea. No constipation. GENITOURINARY SYSTEM:  No dysuria, no urgency, and no frequency. All other systems are reviewed and they are negative. PHYSICAL EXAMINATION:  GENERAL APPEARANCE:  The patient appeared ill, in moderate distress. VITAL SIGNS:  On arrival at the emergency room, temperature 98.4, pulse 93, respiratory rate 23, blood pressure 170/78, oxygen saturation 92% on room air. HEENT:  Head:  Normocephalic, atraumatic. Eyes:  Normal eye movement. No redness, no drainage, no discharge. Ears:  Normal external ears with no evidence of drainage. Nose:  No deformity and no drainage. Mouth and Throat:  No visible oral lesion. NECK:  Neck is supple. No JVD, no thyromegaly. CHEST:  Few expiratory wheezing. No crackles. HEART:  Normal S1 and S2.  Regular. No clinically appreciable murmur. ABDOMEN:  Soft, obese, nontender. Normal bowel sounds. CNS:  Alert, oriented x3. No gross focal neurological deficit. EXTREMITIES:  Edema 2+.   Pulses 2+ bilaterally. MUSCULOSKELETAL SYSTEM:  No evidence of joint deformity or swelling. SKIN:  Bilateral lower extremity lymphedema noted and present on admission. PSYCHIATRY:  Normal mood and affect. LYMPHATIC SYSTEM:  No cervical lymphadenopathy. DIAGNOSTIC DATA:  CTA of the chest, no pulmonary mass, patchy airspace infiltrate in the left upper and lower lobes and minimally in the right lower lobe suspicious for pneumonia. LABORATORY DATA:  Chemistry:  Sodium 138, potassium 3.6, chloride 105, CO2 30, glucose 113, BUN 14, creatinine 1.05, calcium 8.8, total bilirubin 0.5, ALT 20, AST 26, alkaline phosphatase 80, total protein at 7.4, albumin level 3.2, globulin at 4.2. Lactic acid level 0.6. Urinalysis: This is significant for negative blood, positive nitrite, trace leukocyte esterase, 4+ bacteria. Hematology:  WBC 12.5, hemoglobin 11.4, hematocrit 37.4, platelets 084. ASSESSMENT:  1.  Bacterial pneumonia. 2.  Suspected COVID-19 virus infection. 3.  Acute cystitis without hematuria. 4  Hypertension. 5.  Dyslipidemia. 6.  Obstructive sleep apnea. 7.  Hypothyroidism. 8.  Morbid obesity. 9.  Depression. 10.  Paroxysmal supraventricular tachycardia. 11.  Bilateral lower extremity lymphedema. 12.  Thrombocytopenia. PLAN:  1. Bacterial pneumonia. We will admit the patient for further evaluation and treatment. We will start the patient on Levaquin. We will monitor the patient's clinical response to treatment. This is the most likely cause of the patient's shortness of breath. CTA of the chest is negative for pulmonary embolism. We will check BNP level to determine, if there is a cardiac component to the patient's shortness of breath. We will also check cardiac markers to rule out acute myocardial infarction as a possible cause of shortness of breath. 2.  Suspected COVID-19 virus infection. The patient will be tested for COVID-19 virus. 3.  Acute cystitis without hematuria.   The patient has been started on Levaquin for pneumonia and also for acute cystitis without hematuria. We will await urine culture. 4.  Hypertension. We will resume preadmission medication. We will monitor the patient's blood pressure closely. 5.  Dyslipidemia. We will resume home medication. We will check lipid profile. 6.  Obstructive sleep apnea. We will place the patient on CPAP with home setting. 7.  Hypothyroidism. We will continue with Synthroid. We will check a TSH level. 8.  Morbid obesity. Dietary consult will be requested for dietary counseling. 9.  Depression. We will continue with preadmission medication. 10.  Paroxysmal supraventricular tachycardia. We will monitor the patient closely. We will resume preadmission medication. 11.  Bilateral lower extremity lymphedema. We will check ultrasound of the lower extremity for DVT. Wound Care consult will be requested for local care. 12.  Thrombocytopenia. This is mild. The patient is asymptomatic. We will continue to monitor. 13.  Other Issues:  Code Status: The patient is a full code. We will place the patient on heparin for DVT prophylaxis. If there is further drop in the patient's platelet count, we will consider discontinuation of heparin at that time. FUNCTIONAL STATUS PRIOR TO ADMISSION:  The patient came from home. The patient is ambulatory with a cane. COVID PRECAUTION:  The patient was wearing a face mask. I was wearing a cap, goggle, gloves, gown, and N95 facemask for this patient's encounter.         Annabella Arreguin MD      [id number]  D:  10/17/2020 4:52  T:  10/17/2020 6:25  JOB #:  [id number]  CC:  Kiran Bowles MD

## 2020-10-17 NOTE — PROGRESS NOTES
TRANSFER - IN REPORT:    Verbal report received from Morgan, [address] [facility] (name) on Mee Lee  being received from ED(unit) for routine progression of care      Report consisted of patients Situation, Background, Assessment and   Recommendations(SBAR). Information from the following report(s) SBAR, Kardex, ED Summary, Intake/Output, MAR, Recent Results, Cardiac Rhythm NSR, and Alarm Parameters  was reviewed with the receiving nurse. Opportunity for questions and clarification was provided. Assessment completed upon patients arrival to unit and care assumed. Last 3 Recorded Weights in this Encounter    10/16/20 2337 10/17/20 0704   Weight: (!) 206.3 kg (454 lb 12.9 oz) (!) 204 kg (449 lb 11.2 oz)     Pt was clothed for 10/16/2020 weight    Patient Vitals for the past 12 hrs:   Temp Pulse Resp BP SpO2   10/17/20 0334 99.2 °F (37.3 °C) 84 16 (!) 145/72 97 %   10/16/20 2337 99.8 °F (37.7 °C) 94 22 (!) 148/87 94 %   10/16/20 2254  87 19 (!) 143/73 97 %   10/16/20 2100  87 13 (!) 159/61 94 %   10/16/20 2000  84 15 (!) 160/59 95 %   10/16/20 1900  86 18 108/68 96 %       Problem: Falls - Risk of  Goal: *Absence of Falls  Description: Document Cat Fall Risk and appropriate interventions in the flowsheet.   10/17/2020 0233 by Aura Falling  Outcome: Progressing Towards Goal  Note: Fall Risk Interventions:  Mobility Interventions: Bed/chair exit alarm, Communicate number of staff needed for ambulation/transfer, OT consult for ADLs, Patient to call before getting OOB, Utilize walker, cane, or other assistive device, PT Consult for mobility concerns              Elimination Interventions: Bed/chair exit alarm, Call light in reach, Patient to call for help with toileting needs, Toileting schedule/hourly rounds           10/17/2020 0232 by Aura Falling  Outcome: Progressing Towards Goal  Note: Fall Risk Interventions:  Mobility Interventions: Bed/chair exit alarm, Communicate number of staff needed for ambulation/transfer, OT consult for ADLs, Patient to call before getting OOB, Utilize walker, cane, or other assistive device, PT Consult for mobility concerns              Elimination Interventions: Bed/chair exit alarm, Call light in reach, Patient to call for help with toileting needs, Toileting schedule/hourly rounds              Problem: Activity Intolerance  Goal: *Oxygen saturation during activity within specified parameters  Outcome: Progressing Towards Goal  Note: Pt ambulated to bathroom     Problem: Breathing Pattern - Ineffective  Goal: *Absence of hypoxia  Outcome: Progressing Towards Goal  Note: Pt free of hypoxia throughout shift     Bedside and Verbal shift change report given to Marv Gonzalez RN (oncoming nurse) by Nic Hayes RN (offgoing nurse). Report included the following information SBAR, Kardex, ED Summary, Intake/Output, MAR, Recent Results, Cardiac Rhythm NSR and Alarm Parameters .

## 2020-10-17 NOTE — ED NOTES
TRANSFER - OUT REPORT:    Verbal report given to Melva RN (name) on Lori Feng  being transferred to [facility] (unit) for routine progression of care       Report consisted of patients Situation, Background, Assessment and   Recommendations(SBAR). Information from the following report(s) SBAR, ED Summary and Recent Results was reviewed with the receiving nurse. Lines:   Peripheral IV 10/16/20 Left;Upper Other(comment) (Active)   Site Assessment Clean, dry, & intact 10/16/20 1635   Phlebitis Assessment 0 10/16/20 1635   Infiltration Assessment 0 10/16/20 1635   Dressing Status Clean, dry, & intact 10/16/20 1635        Opportunity for questions and clarification was provided.

## 2020-10-17 NOTE — PROGRESS NOTES
Reason for Admission:   Cough and SOB                   RUR Score:     11%                Plan for utilizing home health:    TBD      PCP: First and Last name:  Cliff Aragon MD    Name of Practice:    Are you a current patient: Yes/No: Yes   Approximate date of last visit: Jan of 2020   Can you participate in a virtual visit with your PCP: Yes                    Current Advanced Directive/Advance Care Plan: None                         Transition of Care Plan:      * Disposition- Home with family support  * PCP/specialist F/U  * Assess need for home oxygen    Saul Mendoza is a 56 yr old female admitted due to  PNA, COVID test is pending. PMH is significant for  hypertension, dyslipidemia, obstructive sleep apnea, hypothyroidism, morbid obesity, depression, paroxysmal supraventricular tachycardia and bilateral lower extremity lymphedema. Interviewed patient by phone due to isolation. Demographics, NOK and PCP verified. Patient stated she lives with her spouse and daughter, Arvilla Eisenmenger who help her with ADL's. Equipment at home includes a rollator and cane. Currently patient is requiring 2L oxygen. Will need to assess for home oxygen need prior to discharge. Care Management Interventions  PCP Verified by CM:  Yes  Discharge Durable Medical Equipment: No  Physical Therapy Consult: No  Occupational Therapy Consult: No  Speech Therapy Consult: No  Current Support Network: Lives with Spouse    Lauren Tip, CARLY, ACM-SW  Complex Care Coordinator/[place]  C: [phone]

## 2020-10-17 NOTE — PROGRESS NOTES
Bedside shift change report given to Flora RN by Jose Ye RN. Report included the following information SBAR, Kardex, ED Summary, Intake/Output, MAR, Recent Results and Cardiac Rhythm NSR. Problem: Breathing Pattern - Ineffective  Goal: *Absence of hypoxia  Outcome: Progressing Towards Goal  Note: Oxygen saturations within defined normal limits on 2L nasal cannula. Will continue to monitor. Problem: Falls - Risk of  Goal: *Absence of Falls  Description: Document Leonardo Mcfarlane Fall Risk and appropriate interventions in the flowsheet.   Outcome: Progressing Towards Goal  Note: Fall Risk Interventions:  Mobility Interventions: Communicate number of staff needed for ambulation/transfer, Patient to call before getting OOB, Utilize walker, cane, or other assistive device(Cane)    Elimination Interventions: Call light in reach, Patient to call for help with toileting needs, Toileting schedule/hourly rounds

## 2020-10-17 NOTE — PROGRESS NOTES
[address] [facility] Adult  Hospitalist Group                                                                                          Hospitalist Progress Note  Hamilton Simental MD  Answering service: [phone] from in house phone        Date of Service:  10/17/2020  NAME:  Leta Xie  :  [id number]  MRN:  [id number]      Admission Summary: This is a 58-year-old woman with a past medical history significant for hypertension, dyslipidemia, obstructive sleep apnea, hypothyroidism, morbid obesity, depression, paroxysmal supraventricular tachycardia, bilateral lower extremity lymphedema, who was in her usual state of health until about 3 days ago when the patient developed cough and shortness of breath. The cough is productive of dark sputum. The patient's spouse and grandson had similar symptoms. She stated that her  was diagnosed with pneumonia, but he tested negative for COVID-19 virus infection last week. No associated fever, rigors, or chills. The shortness of breath is with exertion. No associated chest pain. Because of the worsening symptoms, the patient went to [facility]. She was sent from the [facility] to the emergency room, because her chest x-ray shows evidence of pneumonia. When the patient arrived at the emergency room, CTA of the chest was performed. The CTA shows evidence of pneumonia. The patient was started on antibiotics and was referred to the hospitalist service for evaluation for admission. She was last admitted to this hospital from 2019 to 2019. The patient was admitted and treated for pneumonia. Interval history / Subjective:     F/u Pneumonia   On 2l NC  No documented fever  COVID pending  Feeling better  SOB when moving  Assessment & Plan:     Pneumonia, bacterial vs viral  -CTA chest 10/16 No pulmonary mass.  Patchy airspace infiltrates in the left upper and lower lobes and minimally in the right lower lobe suspicious for pneumonia  -on LVQ  -Follow Cultures    COVID PUI  -Droplet plus  -follow results    Probable acute cystitis  -The patient never had urinary symptoms, dont think the patient has acute cystitic  -Anyways LVQ should cover    HTN  -Continue home meds    Dyslipidemia  -stable    Obstructive sleep apnea  -on CPAP    Hypothyroidism  -TSH 3.8  -on synthroid, continue  -Outpatient follow up    Morbid obesity  -Counseled    Depression  -Continue home meds    Paroxysmal supraventricular tachycardia  -stable    Bilateral lower extremity lymphedema  -Follow dopplers    Thrombocytopenia, mild  -no obvious bleeding  -monitor    Cardiac diet     Code status: FULL CODE  DVT prophylaxis: scd    Plan: Follow cultures, continue antibiotics, discharge today or tomorrow    Care Plan discussed with: Patient/Family  Anticipated Disposition: Home w/Family  Anticipated Discharge: 24 hours to 48 hours     Hospital Problems  Date Reviewed: 10/17/2020          Codes Class Noted POA    * (Principal) Bacterial pneumonia ICD-10-CM: J15.9  ICD-9-CM: 482.9  10/16/2020 Yes                Review of Systems:   A comprehensive review of systems was negative except for that written in the HPI. Vital Signs:    Last 24hrs VS reviewed since prior progress note. Most recent are:  Visit Vitals  /65 (BP 1 Location: Left arm, BP Patient Position: At rest)   Pulse 82   Temp 98.7 °F (37.1 °C)   Resp 17   Ht 5' 9\" (1.753 m)   Wt (!) 204 kg (449 lb 11.2 oz)   SpO2 100%   BMI 66.41 kg/m²       No intake or output data in the 24 hours ending 10/17/20 0954     Physical Examination:             Constitutional:  No acute distress, cooperative, pleasant    ENT:  Oral mucosa moist, oropharynx benign. Resp:  CTA bilaterally. No wheezing/rhonchi/rales. No accessory muscle use   CV:  Regular rhythm, normal rate, no murmurs, gallops, rubs    GI:  Soft, non distended, non tender.  normoactive bowel sounds, no hepatosplenomegaly     Musculoskeletal:  No edema, warm, 2+ pulses throughout    Neurologic:  Moves all extremities. AAOx3, CN II-XII reviewed     Skin:  Good turgor, no rashes or ulcers       Data Review:    Review and/or order of clinical lab test      Labs:     Recent Labs     10/17/20  0121 10/16/20  1742   WBC 10.8 12.5*   HGB 11.1* 11.4*   HCT 35.6 37.4   * 142*     Recent Labs     10/17/20  0121 10/16/20  1632    138   K 4.0 3.6    105   CO2 30 30   BUN 14 14   CREA 1.01 1.05*   * 113*   CA 8.6 8.8   MG 1.9  --    PHOS 2.5*  --      Recent Labs     10/17/20  0121 10/16/20  1632   ALT 18 20   AP 78 80   TBILI 0.6 0.5   TP 7.1 7.4   ALB 2.9* 3.2*   GLOB 4.2* 4.2*   LPSE 39*  --      Recent Labs     10/16/20  0121   INR 1.2*   PTP 12.3*   APTT 29.9      Recent Labs     10/17/20  0121   FERR 148      No results found for: FOL, RBCF   No results for input(s): PH, PCO2, PO2 in the last 72 hours.   Recent Labs     10/17/20  0717 10/17/20  0121   TROIQ <0.05 <0.05     Lab Results   Component Value Date/Time    Cholesterol, total 146 10/17/2020 01:21 AM    HDL Cholesterol 53 10/17/2020 01:21 AM    LDL, calculated 80.8 10/17/2020 01:21 AM    Triglyceride 61 10/17/2020 01:21 AM    CHOL/HDL Ratio 2.8 10/17/2020 01:21 AM     Lab Results   Component Value Date/Time    Glucose (POC) 110 (H) 08/04/2010 09:57 AM    Glucose (POC) 83 01/27/2009 01:22 PM     Lab Results   Component Value Date/Time    Color YELLOW/STRAW 10/16/2020 04:52 PM    Appearance CLOUDY (A) 10/16/2020 04:52 PM    Specific gravity 1.025 10/16/2020 04:52 PM    pH (UA) 6.5 10/16/2020 04:52 PM    Protein 30 (A) 10/16/2020 04:52 PM    Glucose Negative 10/16/2020 04:52 PM    Ketone Negative 10/16/2020 04:52 PM    Bilirubin Negative 10/16/2020 04:52 PM    Urobilinogen 2.0 (H) 10/16/2020 04:52 PM    Nitrites Positive (A) 10/16/2020 04:52 PM    Leukocyte Esterase TRACE (A) 10/16/2020 04:52 PM    Epithelial cells FEW 10/16/2020 04:52 PM    Bacteria 4+ (A) 10/16/2020 04:52 PM    WBC 0-4 10/16/2020 04:52 PM    RBC 0-5 10/16/2020 04:52 PM         Medications Reviewed:     Current Facility-Administered Medications   Medication Dose Route Frequency    aspirin chewable tablet 81 mg  81 mg Oral DAILY    buPROPion SR (WELLBUTRIN SR) tablet 150 mg  150 mg Oral BID    . PHARMACY TO SUBSTITUTE PER PROTOCOL (Reordered from: dextromethorphan-quiNIDine (Nuedexta) 20-10 mg per capsule)    Per Protocol    DULoxetine (CYMBALTA) capsule 120 mg  120 mg Oral DAILY    flecainide (TAMBOCOR) tablet 100 mg  100 mg Oral DAILY    furosemide (LASIX) tablet 20 mg  20 mg Oral DAILY    gabapentin (NEURONTIN) capsule 400 mg  400 mg Oral BID    losartan (COZAAR) tablet 50 mg  50 mg Oral DAILY    oxybutynin chloride XL (DITROPAN XL) tablet 15 mg  15 mg Oral DAILY    rosuvastatin (CRESTOR) tablet 5 mg  5 mg Oral QHS    . PHARMACY TO SUBSTITUTE PER PROTOCOL (Reordered from: suvorexant (BELSOMRA) 20 mg tablet)    Per Protocol    verapamil ER (CALAN-SR) tablet 240 mg  240 mg Oral DAILY WITH BREAKFAST    sodium chloride (NS) flush 5-40 mL  5-40 mL IntraVENous Q8H    sodium chloride (NS) flush 5-40 mL  5-40 mL IntraVENous PRN    polyethylene glycol (MIRALAX) packet 17 g  17 g Oral DAILY PRN    heparin (porcine) injection 5,000 Units  5,000 Units SubCUTAneous Q8H    lactobac ac& pc-s.therm-b.anim (YUNIEL Q/RISAQUAD)  1 Cap Oral DAILY    levoFLOXacin (LEVAQUIN) 750 mg in D5W IVPB  750 mg IntraVENous Q24H    acetaminophen (TYLENOL) tablet 650 mg  650 mg Oral Q4H PRN     ______________________________________________________________________  EXPECTED LENGTH OF STAY: - - -  ACTUAL LENGTH OF STAY:          1                 Tameka Whyte MD

## 2020-10-18 ENCOUNTER — APPOINTMENT (OUTPATIENT)
Dept: VASCULAR SURGERY | Age: 60
DRG: 194 | End: 2020-10-18
Attending: INTERNAL MEDICINE
Payer: MEDICARE

## 2020-10-18 VITALS
WEIGHT: 293 LBS | DIASTOLIC BLOOD PRESSURE: 73 MMHG | BODY MASS INDEX: 43.4 KG/M2 | TEMPERATURE: 98.9 F | HEIGHT: 69 IN | SYSTOLIC BLOOD PRESSURE: 132 MMHG | HEART RATE: 80 BPM | RESPIRATION RATE: 18 BRPM | OXYGEN SATURATION: 94 %

## 2020-10-18 LAB
ANION GAP SERPL CALC-SCNC: 5 MMOL/L (ref 5–15)
BASOPHILS # BLD: 0.1 K/UL (ref 0–0.1)
BASOPHILS NFR BLD: 1 % (ref 0–1)
BUN SERPL-MCNC: 16 MG/DL (ref 6–20)
BUN/CREAT SERPL: 16 (ref 12–20)
CALCIUM SERPL-MCNC: 8.8 MG/DL (ref 8.5–10.1)
CHLORIDE SERPL-SCNC: 104 MMOL/L (ref 97–108)
CO2 SERPL-SCNC: 27 MMOL/L (ref 21–32)
CREAT SERPL-MCNC: 1.03 MG/DL (ref 0.55–1.02)
DIFFERENTIAL METHOD BLD: ABNORMAL
EOSINOPHIL # BLD: 0.3 K/UL (ref 0–0.4)
EOSINOPHIL NFR BLD: 4 % (ref 0–7)
ERYTHROCYTE [DISTWIDTH] IN BLOOD BY AUTOMATED COUNT: 13.4 % (ref 11.5–14.5)
GLUCOSE SERPL-MCNC: 110 MG/DL (ref 65–100)
HCT VFR BLD AUTO: 36.5 % (ref 35–47)
HGB BLD-MCNC: 11 G/DL (ref 11.5–16)
IMM GRANULOCYTES # BLD AUTO: 0 K/UL (ref 0–0.04)
IMM GRANULOCYTES NFR BLD AUTO: 0 % (ref 0–0.5)
L PNEUMO1 AG UR QL IA: NEGATIVE
LYMPHOCYTES # BLD: 2.5 K/UL (ref 0.8–3.5)
LYMPHOCYTES NFR BLD: 32 % (ref 12–49)
MCH RBC QN AUTO: 29.6 PG (ref 26–34)
MCHC RBC AUTO-ENTMCNC: 30.1 G/DL (ref 30–36.5)
MCV RBC AUTO: 98.1 FL (ref 80–99)
MONOCYTES # BLD: 1.1 K/UL (ref 0–1)
MONOCYTES NFR BLD: 14 % (ref 5–13)
NEUTS SEG # BLD: 3.9 K/UL (ref 1.8–8)
NEUTS SEG NFR BLD: 49 % (ref 32–75)
NRBC # BLD: 0 K/UL (ref 0–0.01)
NRBC BLD-RTO: 0 PER 100 WBC
PLATELET # BLD AUTO: 155 K/UL (ref 150–400)
PMV BLD AUTO: 11.4 FL (ref 8.9–12.9)
POTASSIUM SERPL-SCNC: 4 MMOL/L (ref 3.5–5.1)
RBC # BLD AUTO: 3.72 M/UL (ref 3.8–5.2)
SODIUM SERPL-SCNC: 136 MMOL/L (ref 136–145)
SPECIMEN SOURCE: NORMAL
WBC # BLD AUTO: 7.8 K/UL (ref 3.6–11)

## 2020-10-18 PROCEDURE — 80048 BASIC METABOLIC PNL TOTAL CA: CPT

## 2020-10-18 PROCEDURE — 74011250636 HC RX REV CODE- 250/636: Performed by: INTERNAL MEDICINE

## 2020-10-18 PROCEDURE — 74011250637 HC RX REV CODE- 250/637: Performed by: INTERNAL MEDICINE

## 2020-10-18 PROCEDURE — 36415 COLL VENOUS BLD VENIPUNCTURE: CPT

## 2020-10-18 PROCEDURE — 85025 COMPLETE CBC W/AUTO DIFF WBC: CPT

## 2020-10-18 PROCEDURE — 94760 N-INVAS EAR/PLS OXIMETRY 1: CPT

## 2020-10-18 RX ORDER — LEVOFLOXACIN 750 MG/1
750 TABLET ORAL DAILY
Qty: 5 TAB | Refills: 0 | Status: SHIPPED | OUTPATIENT
Start: 2020-10-18 | End: 2022-02-25 | Stop reason: ALTCHOICE

## 2020-10-18 RX ADMIN — FUROSEMIDE 20 MG: 20 TABLET ORAL at 08:36

## 2020-10-18 RX ADMIN — GABAPENTIN 400 MG: 400 CAPSULE ORAL at 08:36

## 2020-10-18 RX ADMIN — VERAPAMIL HYDROCHLORIDE 240 MG: 240 TABLET, FILM COATED, EXTENDED RELEASE ORAL at 08:36

## 2020-10-18 RX ADMIN — BUPROPION HYDROCHLORIDE 150 MG: 150 TABLET, EXTENDED RELEASE ORAL at 08:36

## 2020-10-18 RX ADMIN — LOSARTAN POTASSIUM 50 MG: 50 TABLET, FILM COATED ORAL at 08:36

## 2020-10-18 RX ADMIN — DULOXETINE 120 MG: 60 CAPSULE, DELAYED RELEASE ORAL at 08:36

## 2020-10-18 RX ADMIN — Medication 10 ML: at 06:01

## 2020-10-18 RX ADMIN — HEPARIN SODIUM 5000 UNITS: 5000 INJECTION INTRAVENOUS; SUBCUTANEOUS at 08:35

## 2020-10-18 RX ADMIN — ASPIRIN 81 MG CHEWABLE TABLET 81 MG: 81 TABLET CHEWABLE at 08:36

## 2020-10-18 RX ADMIN — HEPARIN SODIUM 5000 UNITS: 5000 INJECTION INTRAVENOUS; SUBCUTANEOUS at 00:58

## 2020-10-18 RX ADMIN — FLECAINIDE ACETATE 100 MG: 100 TABLET ORAL at 08:36

## 2020-10-18 RX ADMIN — OXYBUTYNIN CHLORIDE 15 MG: 5 TABLET, EXTENDED RELEASE ORAL at 08:36

## 2020-10-18 RX ADMIN — Medication 1 CAPSULE: at 08:36

## 2020-10-18 NOTE — PROGRESS NOTES
Last 3 Recorded Weights in this Encounter    10/16/20 2337 10/17/20 0704 10/18/20 0321   Weight: (!) 206.3 kg (454 lb 12.9 oz) (!) 204 kg (449 lb 11.2 oz) (!) 209.5 kg (461 lb 13.8 oz)     Bed weight 10/18 /2020 instead of standing    Patient Vitals for the past 12 hrs:   Temp Pulse Resp BP SpO2   10/18/20 0321 98.4 °F (36.9 °C) 89 16 122/61 94 %   10/17/20 2317 98.9 °F (37.2 °C) 98 16 107/76 95 %   10/17/20 1850 98.9 °F (37.2 °C) 80 18 (!) 144/79 95 %       Problem: Falls - Risk of  Goal: *Absence of Falls  Description: Document Cat Fall Risk and appropriate interventions in the flowsheet. Outcome: Progressing Towards Goal  Note: Fall Risk Interventions:  Mobility Interventions: Communicate number of staff needed for ambulation/transfer, Patient to call before getting OOB, Utilize walker, cane, or other assistive device              Elimination Interventions: Call light in reach, Toilet paper/wipes in reach, Toileting schedule/hourly rounds, Patient to call for help with toileting needs              Problem: Activity Intolerance  Goal: *Oxygen saturation during activity within specified parameters  Outcome: Progressing Towards Goal  Note: O2 saturations WDL on 2L nasal cannula     Bedside and Verbal shift change report given to Ad Thomas RN (oncoming nurse) by Darlene Johnson (offgoing nurse). Report included the following information SBAR, Kardex, ED Summary, Intake/Output, MAR, Recent Results and Cardiac Rhythm NSR.

## 2020-10-18 NOTE — PROGRESS NOTES
I have reviewed discharge instructions with the patient. The patient verbalized understanding. Patient was given an opportunity to ask questions. All questions answered. All peripheral IVs removed. Patient handed Levaquin prescription. Patient taken to main entrance by PCT in a wheelchair.

## 2020-10-18 NOTE — DISCHARGE SUMMARY
Discharge Summary       PATIENT ID: Leta Xie  MRN: [id number]   YOB: 1960    DATE OF ADMISSION: 10/16/2020  2:32 PM    DATE OF DISCHARGE: 10/18/2020   PRIMARY CARE PROVIDER: Alex Carr MD     ATTENDING PHYSICIAN: Dr Hamilton Simental  DISCHARGING PROVIDER: Hamilton Simental MD    To contact this individual call [phone] and ask the  to page. If unavailable ask to be transferred the Adult Hospitalist Department. CONSULTATIONS: None    PROCEDURES/SURGERIES: * No surgery found *    ADMITTING DIAGNOSES & HOSPITAL COURSE:   Pneumonia, bacterial vs viral  -CTA chest 10/16 No pulmonary mass. Patchy airspace infiltrates in the left upper and lower lobes and minimally in the right lower lobe suspicious for pneumonia  -on LVQ  -blood culture unremarkable    COVID PUI  -Droplet plus  -Covid negative  -Will cancel precaution    Probable acute cystitis  -The patient never had urinary symptoms, dont think the patient has acute cystitic  -Anyways LVQ should cover    HTN  -Continue home meds    Dyslipidemia  -stable    Obstructive sleep apnea  -on CPAP    Hypothyroidism  -TSH 3.8  -on synthroid, continue  -Outpatient follow up    Morbid obesity  -Counseled    Depression  -Continue home meds    Paroxysmal supraventricular tachycardia  -stable    Bilateral lower extremity lymphedema  -Follow dopplers    Thrombocytopenia, mild  -no obvious bleeding  -monitor    Cardiac diet        DISCHARGE DIAGNOSES / PLAN:      1.   Pneumonia     ADDITIONAL CARE RECOMMENDATIONS:   Follow up with PMD  Please expect CXR in 4 weeks through your PMD     PENDING TEST RESULTS:   At the time of discharge the following test results are still pending: none    FOLLOW UP APPOINTMENTS:    Follow-up Information     Follow up With Specialties Details Why Contact Info    Alex Carr MD Internal Medicine In 1 week  [address] [facility]  [phone]               DIET: Cardiac Diet    ACTIVITY: Activity as tolerated      DISCHARGE MEDICATIONS:  Current Discharge Medication List      START taking these medications    Details   levoFLOXacin (LEVAQUIN) 750 mg tablet Take 1 Tab by mouth daily. Qty: 5 Tab, Refills: 0         CONTINUE these medications which have NOT CHANGED    Details   furosemide (LASIX) 20 mg tablet TAKE 1 TABLET BY MOUTH EVERY DAY  Qty: 90 Tab, Refills: 0      verapamil ER (CALAN-SR) 240 mg CR tablet TAKE 1 TABLET BY MOUTH EVERY DAY  Qty: 90 Tab, Refills: 3    Comments: **Patient requests 90 days supply**      dextromethorphan-quiNIDine (Nuedexta) 20-10 mg per capsule Take 1 Cap by mouth every twelve (12) hours. 1 po tab q12h      aspirin 81 mg chewable tablet Take 81 mg by mouth daily. rosuvastatin (CRESTOR) 5 mg tablet TAKE 1 TABLET BY MOUTH DAILY  Qty: 90 Tab, Refills: 3      losartan (COZAAR) 50 mg tablet TAKE 1 TABLET BY MOUTH EVERY DAY  Qty: 90 Tab, Refills: 3      gabapentin (NEURONTIN) 400 mg capsule Take 400 mg by mouth two (2) times a day. oxybutynin chloride XL (DITROPAN XL) 15 mg CR tablet Take 15 mg by mouth daily. suvorexant (BELSOMRA) 20 mg tablet Take 20 mg by mouth nightly. buPROPion XL (Wellbutrin XL) 300 mg XL tablet Take 300 mg by mouth every morning. flecainide (TAMBOCOR) 100 mg tablet Take 100 mg by mouth daily. duloxetine (CYMBALTA) 60 mg capsule Take 120 mg by mouth daily. diclofenac (VOLTAREN) 1 % gel Apply  to affected area four (4) times daily. Qty: 100 g, Refills: 5               NOTIFY YOUR PHYSICIAN FOR ANY OF THE FOLLOWING:   Fever over 101 degrees for 24 hours. Chest pain, shortness of breath, fever, chills, nausea, vomiting, diarrhea, change in mentation, falling, weakness, bleeding. Severe pain or pain not relieved by medications. Or, any other signs or symptoms that you may have questions about.     DISPOSITION:  x  Home With:   OT  PT  PHILIP  RN       Long term SNF/Inpatient Rehab Independent/assisted living    Hospice    Other:       PATIENT CONDITION AT DISCHARGE:     Functional status    Poor     Deconditioned    x Independent      Cognition   x  Lucid     Forgetful     Dementia      Catheters/lines (plus indication)    Chandra     PICC     PEG    x None      Code status    x Full code     DNR      PHYSICAL EXAMINATION AT DISCHARGE:  Please see progress note    CHRONIC MEDICAL DIAGNOSES:  Problem List as of 10/18/2020 Date Reviewed: 10/17/2020          Codes Class Noted - Resolved    * (Principal) Bacterial pneumonia ICD-10-CM: J15.9  ICD-9-CM: 482.9  10/16/2020 - Present        Pneumonia ICD-10-CM: J18.9  ICD-9-CM: 486  4/3/2019 - Present        Subclinical hypothyroidism ICD-10-CM: E03.9  ICD-9-CM: 244.8  12/19/2017 - Present        Morbid obesity with body mass index of 60.0-69.9 in [facility]) (Chronic) ICD-10-CM: E66.01, Z68.44  ICD-9-CM: 278.01, V85.44  3/1/2016 - Present        KEISHA (obstructive sleep apnea) ICD-10-CM: G47.33  ICD-9-CM: 327.23  10/11/2010 - Present    Overview Signed 10/11/2010  4:25 PM by Fiona Spicer MD     CPAP             Essential hypertension, benign ICD-10-CM: I10  ICD-9-CM: 401.1  2/20/2010 - Present        PSVT (paroxysmal supraventricular tachycardia) ([facility] [address] ICD-10-CM: I47.1  ICD-9-CM: 427.0  2/20/2010 - Present    Overview Addendum 9/9/2013  4:31 PM by MD Dr. Kyung Orellana Like             Depression ICD-10-CM: F32.9  ICD-9-CM: 793  2/20/2010 - Present    Overview Signed 2/28/2012 11:28 AM by MD Dr. Elisha Orellana Brought tunnel syndrome ICD-10-CM: G56.00  ICD-9-CM: 354.0  2/20/2010 - Present    Overview Signed 2/20/2010  2:44 PM by Fiona Spicer MD     b/l             Pure hypercholesterolemia ICD-10-CM: E78.00  ICD-9-CM: 272.0  2/20/2010 - Present        RESOLVED: Obesity ICD-10-CM: E66.9  ICD-9-CM: 278.00  2/20/2010 - 3/1/2016              Greater than 36 minutes were spent with the patient on counseling and coordination of care    Signed:   Dar Kim MD  10/18/2020  10:38 AM   .

## 2020-10-18 NOTE — DISCHARGE INSTRUCTIONS
Discharge Instructions       PATIENT ID: Nitza Washburn  MRN: [id number]   YOB: 1960    DATE OF ADMISSION: 10/16/2020  2:32 PM    DATE OF DISCHARGE: 10/18/2020    PRIMARY CARE PROVIDER: Asif Celis MD     ATTENDING PHYSICIAN: Nitza Uribe MD  DISCHARGING PROVIDER: Amy Peres MD    To contact this individual call [phone] and ask the  to page. If unavailable ask to be transferred the Adult Hospitalist Department. DISCHARGE DIAGNOSES   Pneumonia    CONSULTATIONS: None    PROCEDURES/SURGERIES: * No surgery found *    PENDING TEST RESULTS:   At the time of discharge the following test results are still pending: none    FOLLOW UP APPOINTMENTS:   Follow-up Information     Follow up With Specialties Details Why Contact Info    Asif Celis MD Internal Medicine In 1 week  [address]  [address]  [facility]  [phone]             ADDITIONAL CARE RECOMMENDATIONS:   Follow up with PMD  Please expect a cxr in 4 weeks of discharge through your PMD    DIET: Cardiac Diet    ACTIVITY: Activity as tolerated    DISCHARGE MEDICATIONS:   See Medication Reconciliation Form    · It is important that you take the medication exactly as they are prescribed. · Keep your medication in the bottles provided by the pharmacist and keep a list of the medication names, dosages, and times to be taken in your wallet. · Do not take other medications without consulting your doctor. NOTIFY YOUR PHYSICIAN FOR ANY OF THE FOLLOWING:   Fever over 101 degrees for 24 hours. Chest pain, shortness of breath, fever, chills, nausea, vomiting, diarrhea, change in mentation, falling, weakness, bleeding. Severe pain or pain not relieved by medications. Or, any other signs or symptoms that you may have questions about.       DISPOSITION:   x Home With:   OT  PT  HH  RN       SNF/Inpatient Rehab/LTAC    Independent/assisted living    Hospice    Other:     CDMP Checked:   Yes x PROBLEM LIST Updated:  Yes x       Signed:   Dann Martínez MD  10/18/2020  10:37 AM

## 2020-10-18 NOTE — PROGRESS NOTES
[facility] [facility]                                                                                          Hospitalist Progress Note  Ananya Raphael MD  Answering service: [phone] from in house phone        Date of Service:  10/18/2020  NAME:  Fahad Cole  :    MRN:  [id number]      Admission Summary: This is a 56-year-old woman with a past medical history significant for hypertension, dyslipidemia, obstructive sleep apnea, hypothyroidism, morbid obesity, depression, paroxysmal supraventricular tachycardia, bilateral lower extremity lymphedema, who was in her usual state of health until about 3 days ago when the patient developed cough and shortness of breath. The cough is productive of dark sputum. The patient's spouse and grandson had similar symptoms. She stated that her  was diagnosed with pneumonia, but he tested negative for COVID-19 virus infection last week. No associated fever, rigors, or chills. The shortness of breath is with exertion. No associated chest pain. Because of the worsening symptoms, the patient went to [facility]. She was sent from the [facility] to the emergency room, because her chest x-ray shows evidence of pneumonia. When the patient arrived at the emergency room, CTA of the chest was performed. The CTA shows evidence of pneumonia. The patient was started on antibiotics and was referred to the hospitalist service for evaluation for admission. She was last admitted to this hospital from 2019 to 2019. The patient was admitted and treated for pneumonia. Interval history / Subjective:     F/u Pneumonia   Now on room air  No documented fever  COVID pending  Feeling better  SOB when moving  Assessment & Plan:     Pneumonia, bacterial vs viral  -CTA chest 10/16 No pulmonary mass.  Patchy airspace infiltrates in the left upper and lower lobes and minimally in the right lower lobe suspicious for pneumonia  -on LVQ  -blood culture unremarkable    COVID PUI  -Droplet plus  -Covid negative  -Will cancel precaution    Probable acute cystitis  -The patient never had urinary symptoms, dont think the patient has acute cystitic  -Anyways LVQ should cover    HTN  -Continue home meds    Dyslipidemia  -stable    Obstructive sleep apnea  -on CPAP    Hypothyroidism  -TSH 3.8  -on synthroid, continue  -Outpatient follow up    Morbid obesity  -Counseled    Depression  -Continue home meds    Paroxysmal supraventricular tachycardia  -stable    Bilateral lower extremity lymphedema  -Follow dopplers    Thrombocytopenia, mild  -no obvious bleeding  -monitor    Cardiac diet     Code status: FULL CODE  DVT prophylaxis: scd    Plan: Will discharge the patient today to home    Care Plan discussed with: Patient/Family  Anticipated Disposition: Home w/Family  Anticipated Discharge: 24 hours to 48 hours     Hospital Problems  Date Reviewed: 10/17/2020          Codes Class Noted POA    * (Principal) Bacterial pneumonia ICD-10-CM: J15.9  ICD-9-CM: 482.9  10/16/2020 Yes                Review of Systems:   A comprehensive review of systems was negative except for that written in the HPI. Vital Signs:    Last 24hrs VS reviewed since prior progress note. Most recent are:  Visit Vitals  /73 (BP 1 Location: Left arm, BP Patient Position: At rest)   Pulse 80   Temp 98.9 °F (37.2 °C)   Resp 18   Ht 5' 9\" (1.753 m)   Wt (!) 209.5 kg (461 lb 13.8 oz)   SpO2 94%   BMI 68.21 kg/m²         Intake/Output Summary (Last 24 hours) at 10/18/2020 1032  Last data filed at 10/18/2020 0700  Gross per 24 hour   Intake 150 ml   Output    Net 150 ml        Physical Examination:             Constitutional:  No acute distress, cooperative, pleasant    ENT:  Oral mucosa moist, oropharynx benign. Resp:  CTA bilaterally. No wheezing/rhonchi/rales.  No accessory muscle use   CV:  Regular rhythm, normal rate, no murmurs, gallops, rubs    GI:  Soft, non distended, non tender. normoactive bowel sounds, no hepatosplenomegaly     Musculoskeletal:  No edema, warm, 2+ pulses throughout    Neurologic:  Moves all extremities. AAOx3, CN II-XII reviewed     Skin:  Good turgor, no rashes or ulcers       Data Review:    Review and/or order of clinical lab test      Labs:     Recent Labs     10/18/20  0137 10/17/20  0121   WBC 7.8 10.8   HGB 11.0* 11.1*   HCT 36.5 35.6    146*     Recent Labs     10/18/20  0137 10/17/20  0121 10/16/20  1632    139 138   K 4.0 4.0 3.6    104 105   CO2 27 30 30   BUN 16 14 14   CREA 1.03* 1.01 1.05*   * 134* 113*   CA 8.8 8.6 8.8   MG  --  1.9  --    PHOS  --  2.5*  --      Recent Labs     10/17/20  0121 10/16/20  1632   ALT 18 20   AP 78 80   TBILI 0.6 0.5   TP 7.1 7.4   ALB 2.9* 3.2*   GLOB 4.2* 4.2*   LPSE 39*  --      Recent Labs     10/16/20  0121   INR 1.2*   PTP 12.3*   APTT 29.9      Recent Labs     10/17/20  0121   FERR 148      No results found for: FOL, RBCF   No results for input(s): PH, PCO2, PO2 in the last 72 hours.   Recent Labs     10/17/20  1243 10/17/20  0717 10/17/20  0121   TROIQ <0.05 <0.05 <0.05     Lab Results   Component Value Date/Time    Cholesterol, total 146 10/17/2020 01:21 AM    HDL Cholesterol 53 10/17/2020 01:21 AM    LDL, calculated 80.8 10/17/2020 01:21 AM    Triglyceride 61 10/17/2020 01:21 AM    CHOL/HDL Ratio 2.8 10/17/2020 01:21 AM     Lab Results   Component Value Date/Time    Glucose (POC) 110 (H) 08/04/2010 09:57 AM    Glucose (POC) 83 01/27/2009 01:22 PM     Lab Results   Component Value Date/Time    Color YELLOW/STRAW 10/16/2020 04:52 PM    Appearance CLOUDY (A) 10/16/2020 04:52 PM    Specific gravity 1.025 10/16/2020 04:52 PM    pH (UA) 6.5 10/16/2020 04:52 PM    Protein 30 (A) 10/16/2020 04:52 PM    Glucose Negative 10/16/2020 04:52 PM    Ketone Negative 10/16/2020 04:52 PM    Bilirubin Negative 10/16/2020 04:52 PM    Urobilinogen 2.0 (H) 10/16/2020 04:52 PM    Nitrites Positive (A) 10/16/2020 04:52 PM    Leukocyte Esterase TRACE (A) 10/16/2020 04:52 PM    Epithelial cells FEW 10/16/2020 04:52 PM    Bacteria 4+ (A) 10/16/2020 04:52 PM    WBC 0-4 10/16/2020 04:52 PM    RBC 0-5 10/16/2020 04:52 PM         Medications Reviewed:     Current Facility-Administered Medications   Medication Dose Route Frequency    aspirin chewable tablet 81 mg  81 mg Oral DAILY    buPROPion SR (WELLBUTRIN SR) tablet 150 mg  150 mg Oral BID    . PHARMACY TO SUBSTITUTE PER PROTOCOL (Reordered from: dextromethorphan-quiNIDine (Nuedexta) 20-10 mg per capsule)    Per Protocol    DULoxetine (CYMBALTA) capsule 120 mg  120 mg Oral DAILY    flecainide (TAMBOCOR) tablet 100 mg  100 mg Oral DAILY    furosemide (LASIX) tablet 20 mg  20 mg Oral DAILY    gabapentin (NEURONTIN) capsule 400 mg  400 mg Oral BID    losartan (COZAAR) tablet 50 mg  50 mg Oral DAILY    oxybutynin chloride XL (DITROPAN XL) tablet 15 mg  15 mg Oral DAILY    rosuvastatin (CRESTOR) tablet 5 mg  5 mg Oral QHS    . PHARMACY TO SUBSTITUTE PER PROTOCOL (Reordered from: suvorexant (BELSOMRA) 20 mg tablet)    Per Protocol    verapamil ER (CALAN-SR) tablet 240 mg  240 mg Oral DAILY WITH BREAKFAST    sodium chloride (NS) flush 5-40 mL  5-40 mL IntraVENous Q8H    sodium chloride (NS) flush 5-40 mL  5-40 mL IntraVENous PRN    polyethylene glycol (MIRALAX) packet 17 g  17 g Oral DAILY PRN    heparin (porcine) injection 5,000 Units  5,000 Units SubCUTAneous Q8H    lactobac ac& pc-s.therm-b.anim (YUNIEL Q/RISAQUAD)  1 Cap Oral DAILY    levoFLOXacin (LEVAQUIN) 750 mg in D5W IVPB  750 mg IntraVENous Q24H    acetaminophen (TYLENOL) tablet 650 mg  650 mg Oral Q4H PRN     ______________________________________________________________________  EXPECTED LENGTH OF STAY: - - -  ACTUAL LENGTH OF STAY:          2                 Dar Kim MD

## 2020-10-19 ENCOUNTER — PATIENT OUTREACH (OUTPATIENT)
Dept: CASE MANAGEMENT | Age: 60
End: 2020-10-19

## 2020-10-19 NOTE — PROGRESS NOTES
Patient contacted regarding COVID-19 risk. Discussed COVID-19 related testing which was completed at this time. Test results were negative. Patient informed of results, if available? Yes. Outreach made within 2 business days of discharge: yes    Care Transition Nurse/ Ambulatory Care Manager/ LPN Care Coordinator contacted the pt by telephone to perform post discharge assessment. Verified name and  with pt as identifiers. Provided introduction to self, and explanation of the CTN/ACM/LPN role, and reason for call due to risk factors for infection and/or exposure to COVID-19. Symptoms reviewed with pt who verbalized the following symptoms: none. Due to no symptoms encounter was not routed to provider for escalation. Discussed follow-up appointments. If no appointment was previously scheduled, appointment scheduling offered: no- encouraged pt to schedule [facility] follow up appointment(s): No future appointments. Non-[facility] follow up appointment(s): none   Advance Care Planning:   Does patient have an Advance Directive: no- declined education    Patient has following risk factors of: pneumonia. CTN/ACM/LPN reviewed discharge instructions, medical action plan and red flags such as increased shortness of breath, increasing fever and signs of decompensation with pt who verbalized understanding. Discussed exposure protocols and quarantine with CDC Guidelines What to do if you are sick with coronavirus disease .  pt was given an opportunity for questions and concerns. The pt agrees to contact the [facility] exposure line [phone], local health department  and PCP office for questions related to their healthcare. CTN/ACM provided contact information for future needs. Reviewed and educated pt on any new and changed medications related to discharge diagnosis.     Patient/family/caregiver given information for [employer] and agrees to enroll no      14 day call based on severity of symptoms and risk factors.

## 2020-10-21 LAB
BACTERIA SPEC CULT: NORMAL
SERVICE CMNT-IMP: NORMAL

## 2020-10-23 ENCOUNTER — VIRTUAL VISIT (OUTPATIENT)
Dept: INTERNAL MEDICINE CLINIC | Age: 60
End: 2020-10-23
Payer: MEDICARE

## 2020-10-23 DIAGNOSIS — J15.9 BACTERIAL PNEUMONIA: ICD-10-CM
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
## 2020-10-23 PROCEDURE — 99496 TRANSJ CARE MGMT HIGH F2F 7D: CPT | Performed by: FAMILY MEDICINE

## 2020-10-23 PROCEDURE — G8428 CUR MEDS NOT DOCUMENT: HCPCS | Performed by: FAMILY MEDICINE

## 2020-10-23 RX ORDER — CYCLOBENZAPRINE HCL 5 MG
5 TABLET ORAL 2 TIMES DAILY
Qty: 60 TAB | Refills: 0 | Status: SHIPPED | OUTPATIENT
Start: 2020-10-23 | End: 2022-02-25

## 2020-10-23 NOTE — PROGRESS NOTES
Joseph Young is a 56 y.o. female who was seen by synchronous (real-time) audio-video technology on 10/23/2020 for Cough (follow up )    [facility]. She then went to [facility] and was admitted for three days. She was released this past Sunday. She took her last mixon of abx last night ( levaquin). She is using the incentive spirometer. Her phlegm in now clear. She fell outside of her home when she came from to the hospital. She had a negative Covid screen in the hospital.    Assessment & Plan:   Diagnoses and all orders for this visit:    1. Fall (on) (from) other stairs and steps, initial encounter  -     cyclobenzaprine (FLEXERIL) 5 mg tablet; Take 1 Tab by mouth two (2) times a day. 2. Bacterial pneumonia  -     XR CHEST PA LAT; Future      This patient is doing well after her discharge from the hospital.  She is completed a course of antibiotics  Denies any significant shortness of breath. A future order was placed for a repeat chest x-ray to be done in 4 weeks. Due to patient's muscle spasms secondary to her fall , a prescription for Flexeril was sent to her pharmacy. Subjective:       Prior to Admission medications    Medication Sig Start Date End Date Taking? Authorizing Provider   cyclobenzaprine (FLEXERIL) 5 mg tablet Take 1 Tab by mouth two (2) times a day. 10/23/20  Yes Trey King MD   levoFLOXacin (LEVAQUIN) 750 mg tablet Take 1 Tab by mouth daily. 10/18/20  Yes Álvaro Barajas MD   furosemide (LASIX) 20 mg tablet TAKE 1 TABLET BY MOUTH EVERY DAY 10/1/20  Yes Silvia Silveira MD   verapamil ER (CALAN-SR) 240 mg CR tablet TAKE 1 TABLET BY MOUTH EVERY DAY 9/6/20  Yes Silvia Silveira MD   dextromethorphan-quiNIDine (Nuedexta) 20-10 mg per capsule Take 1 Cap by mouth every twelve (12) hours. 1 po tab q12h   Yes Provider, Historical   aspirin 81 mg chewable tablet Take 81 mg by mouth daily.    Yes Provider, Historical   rosuvastatin (CRESTOR) 5 mg tablet TAKE 1 TABLET BY MOUTH DAILY 1/26/20  Yes Flynn Keyes MD   losartan (COZAAR) 50 mg tablet TAKE 1 TABLET BY MOUTH EVERY DAY 1/10/20  Yes Flynn Keyes MD   diclofenac (VOLTAREN) 1 % gel Apply  to affected area four (4) times daily. 1/9/20  Yes Flynn Keyes MD   gabapentin (NEURONTIN) 400 mg capsule Take 400 mg by mouth two (2) times a day. Yes Provider, Historical   oxybutynin chloride XL (DITROPAN XL) 15 mg CR tablet Take 15 mg by mouth daily. Yes Provider, Historical   suvorexant (BELSOMRA) 20 mg tablet Take 20 mg by mouth nightly. Yes Provider, Historical   buPROPion XL (Wellbutrin XL) 300 mg XL tablet Take 300 mg by mouth every morning. Yes Provider, Historical   flecainide (TAMBOCOR) 100 mg tablet Take 100 mg by mouth daily. Yes Provider, Historical   duloxetine (CYMBALTA) 60 mg capsule Take 120 mg by mouth daily. 7/30/10  Yes Provider, Historical         Review of Systems   Constitutional: Positive for malaise/fatigue. HENT: Negative. Eyes: Negative. Respiratory: Positive for cough. Cardiovascular: Negative. Gastrointestinal: Negative. Genitourinary: Negative. Musculoskeletal: Negative. Skin: Negative. Psychiatric/Behavioral: Negative. Objective:   No flowsheet data found.      [INSTRUCTIONS:  \"[x]\" Indicates a positive item  \"[]\" Indicates a negative item  -- DELETE ALL ITEMS NOT EXAMINED]    Constitutional: [x] Appears well-developed and well-nourished [x] No apparent distress      [] Abnormal -     Mental status: [x] Alert and awake  [x] Oriented to person/place/time [x] Able to follow commands    [] Abnormal -     Eyes:   EOM    [x]  Normal    [] Abnormal -   Sclera  [x]  Normal    [] Abnormal -          Discharge [x]  None visible   [] Abnormal -     HENT: [x] Normocephalic, atraumatic  [] Abnormal -   [x] Mouth/Throat: Mucous membranes are moist    External Ears [x] Normal  [] Abnormal -    Neck: [x] No visualized mass [] Abnormal -     Pulmonary/Chest: [x] Respiratory effort normal   [x] No visualized signs of difficulty breathing or respiratory distress        [] Abnormal -      Musculoskeletal:   [x] Normal gait with no signs of ataxia         [x] Normal range of motion of neck        [] Abnormal -     Neurological:        [x] No Facial Asymmetry (Cranial nerve 7 motor function) (limited exam due to video visit)          [x] No gaze palsy        [] Abnormal -          Skin:        [x] No significant exanthematous lesions or discoloration noted on facial skin         [] Abnormal -            Psychiatric:       [x] Normal Affect [] Abnormal -        [x] No Hallucinations    Other pertinent observable physical exam findings:-        We discussed the expected course, resolution and complications of the diagnosis(es) in detail. Medication risks, benefits, costs, interactions, and alternatives were discussed as indicated. I advised her to contact the office if her condition worsens, changes or fails to improve as anticipated. She expressed understanding with the diagnosis(es) and plan. Rosendo De La Vega, who was evaluated through a patient-initiated, synchronous (real-time) audio-video encounter, and/or her healthcare decision maker, is aware that it is a billable service, with coverage as determined by her insurance carrier. She provided verbal consent to proceed: Yes, and patient identification was verified. It was conducted pursuant to the emergency declaration under the [address] [place] [address] and the [employer] and [employer] General Act. A caregiver was present when appropriate. Ability to conduct physical exam was limited. I was at home. The patient was at home.       Brian Mcmahon MD

## 2020-10-26 ENCOUNTER — APPOINTMENT (OUTPATIENT)
Dept: GENERAL RADIOLOGY | Age: 60
End: 2020-10-26
Payer: MEDICARE

## 2020-10-26 PROCEDURE — 73030 X-RAY EXAM OF SHOULDER: CPT

## 2020-10-26 PROCEDURE — 73562 X-RAY EXAM OF KNEE 3: CPT

## 2020-10-26 PROCEDURE — 73590 X-RAY EXAM OF LOWER LEG: CPT

## 2020-10-26 PROCEDURE — 99284 EMERGENCY DEPT VISIT MOD MDM: CPT

## 2020-10-27 ENCOUNTER — HOSPITAL ENCOUNTER (EMERGENCY)
Age: 60
Discharge: HOME | End: 2020-10-27
Attending: STUDENT IN AN ORGANIZED HEALTH CARE EDUCATION/TRAINING PROGRAM
Payer: MEDICARE

## 2020-10-27 VITALS
SYSTOLIC BLOOD PRESSURE: 150 MMHG | HEART RATE: 82 BPM | RESPIRATION RATE: 16 BRPM | OXYGEN SATURATION: 93 % | TEMPERATURE: 98.2 F | DIASTOLIC BLOOD PRESSURE: 72 MMHG

## 2020-10-27 DIAGNOSIS — T14.8XXA MUSCLE STRAIN: ICD-10-CM

## 2020-10-27 DIAGNOSIS — M25.561 ACUTE PAIN OF RIGHT KNEE: Primary | ICD-10-CM

## 2020-10-27 PROCEDURE — 74011250637 HC RX REV CODE- 250/637: Performed by: STUDENT IN AN ORGANIZED HEALTH CARE EDUCATION/TRAINING PROGRAM

## 2020-10-27 RX ORDER — IBUPROFEN 400 MG/1
800 TABLET ORAL ONCE
Status: COMPLETED | OUTPATIENT
Start: 2020-10-27 | End: 2020-10-27

## 2020-10-27 RX ADMIN — IBUPROFEN 800 MG: 400 TABLET ORAL at 01:53

## 2020-10-27 NOTE — ED NOTES
Patient discharged by provided. Patient provided with discharge instructions Rx and instructions on follow up care. Patient out of ED via EMS.

## 2020-10-27 NOTE — ED NOTES
Pt stated, she did not want to wait for [facility], pt out via wheelchair. Pt out of ER. Cancelled transport for [facility].

## 2020-10-27 NOTE — DISCHARGE INSTRUCTIONS
Please continue to use Voltaren gel for pain relief. You can also use Tylenol or Motrin as needed for pain relief. It is also recommended that you use ice packs. Please ask your doctor about physical therapy as this may help your knee pain long term.

## 2020-10-27 NOTE — ED PROVIDER NOTES
Ms. Melody Urrutia is a 56-year-old woman with a past medical history of morbid obesity, hypertension, obstructive sleep apnea who presents to the emergency department with 1 week of right lower extremity pain. She states that she was just discharged from the hospital a week ago and as she was getting home her first day her left foot gave out and she stumbled. Denies an actual fall. She states that she was grabbing onto the stair railing and had 2 people helping her. Apparently they helped lower her to the ground. She states that her right leg was awkwardly positioned during this event. States that she has had worsening right knee pain for the last week. She states that in order to walk around the house she has to walk on her right toes and has difficulty using her foot normally due to knee pain. Denies any hip pain ankle pain on the right side. She states that her left side is okay. She also endorses left upper extremity pain from holding onto the railing. She talked to her primary care doctor about this and was prescribed cycobenzaprine which she has been using with minimal relief.       Leg Pain           Past Medical History:   Diagnosis Date    Cholelithiasis     GERD (gastroesophageal reflux disease)     High cholesterol     Hypertension     Lymphedema of left leg     Migraines     Morbid obesity (HCC)     KEISHA (obstructive sleep apnea)     Uses CPAP    PSVT (paroxysmal supraventricular tachycardia) (HCC)     Paroxysmal Supraventricular Tachycardia    Psychiatric disorder     Depression    Unspecified adverse effect of anesthesia May 2006    Postoperative hypoxemia, treated w/ O2 & IV Diuretics       Past Surgical History:   Procedure Laterality Date    DILATION AND CURETTAGE      HX  SECTION      HX CHOLECYSTECTOMY      HX CYST INCISION AND DRAINAGE Right 2014    HX DILATION AND CURETTAGE  May 2006    HX GYN  2008    Uterine Ablation    HX TONSIL AND ADENOIDECTOMY      REMOVAL GALLBLADDER           Family History:   Problem Relation Age of Onset   Roman Cancer Mother         Lung or breast, patient unsure    Heart Attack Father     Diabetes Sister        Social History     Socioeconomic History    Marital status:      Spouse name: Not on file    Number of children: Not on file    Years of education: Not on file    Highest education level: Not on file   Occupational History    Not on file   Social Needs    Financial resource strain: Not on file    Food insecurity     Worry: Not on file     Inability: Not on file    Transportation needs     Medical: Not on file     Non-medical: Not on file   Tobacco Use    Smoking status: Never Smoker    Smokeless tobacco: Never Used   Substance and Sexual Activity    Alcohol use: Yes     Alcohol/week: 1.0 standard drinks     Types: 1 Glasses of wine per week     Comment: seldom    Drug use: Yes     Types: Prescription, OTC    Sexual activity: Yes     Partners: Male     Birth control/protection: None   Lifestyle    Physical activity     Days per week: Not on file     Minutes per session: Not on file    Stress: Not on file   Relationships    Social connections     Talks on phone: Not on file     Gets together: Not on file     Attends Jainism service: Not on file     Active member of club or organization: Not on file     Attends meetings of clubs or organizations: Not on file     Relationship status: Not on file    Intimate partner violence     Fear of current or ex partner: Not on file     Emotionally abused: Not on file     Physically abused: Not on file     Forced sexual activity: Not on file   Other Topics Concern    Not on file   Social History Narrative    Not on file         ALLERGIES: Atorvastatin; Codeine; and Penicillins    Review of Systems   Constitutional: Negative for fever. Gastrointestinal: Negative for nausea and vomiting. Musculoskeletal: Positive for arthralgias, gait problem and myalgias.  Negative for joint swelling. Skin: Negative for wound. Vitals:    10/26/20 2117   BP: (!) 150/72   Resp: 16   Temp: 98.2 °F (36.8 °C)   SpO2: 93%            Physical Exam  Vitals signs reviewed. Constitutional:       General: She is not in acute distress. Appearance: She is obese. HENT:      Head: Atraumatic. Eyes:      Conjunctiva/sclera: Conjunctivae normal.   Neck:      Musculoskeletal: Normal range of motion. Pulmonary:      Effort: Pulmonary effort is normal.   Neurological:      Mental Status: She is alert. Right knee has tenderness in variable position. Given her body habitus it is difficult to appreciate any swelling. She has no tenderness along the medial or lateral joint line. Patella appears to be in normal position and is mobile and nontender. Mild tenderness around the calf muscle and posterior knee. No obvious swelling or ecchymosis    MDM  ED Course as of Oct 27 0224   Tue Oct 27, 2020   0135 All xrays are negative, ibuprofen ordered    [RG]      ED Course User Index  [RG] Carolyne Mott MD   67-year-old woman with underlying osteoarthritis and morbid obesity who presents with 1 week of right knee pain right tibia pain and left shoulder pain after stumbling trying to get into her house and having a controlled lowering to the ground. Differential diagnosis includes but is not limited to bony fracture versus dislocation versus effusion versus muscle strain versus DVT. Unlikely fracture given no abrupt trauma however x-rays were obtained showing no fracture. No effusion visualized on imaging either and none appreciated on physical exam.  On exam her tenderness it does appear to shift not be localized to one region. Possible DVT given her recent hospitalization however no obvious edema in lower extremity and history of this occurring after a mechanical event. No pain on passive range of motion. Able to range the knee.   Will treat with ibuprofen 800 mg p.o. and advised patient follow-up with her primary care doctor for further imaging and/or intervention. Physical Therapy may be beneficial to this patient with chronic osteoarthritis. Patient has pain along the left bicep. Highly suspect this is also muscle strain from grabbing onto a railing and attempt to control a stumbling fall. Patient endorses mild pain to palpation and range of motion. She has no pain at rest.  X-rays are unremarkable. Highly suspect muscle strain. Less likely rotator cuff injury. Will pursue conservative management and outpatient follow-up.   Procedures

## 2020-10-30 ENCOUNTER — PATIENT OUTREACH (OUTPATIENT)
Dept: CASE MANAGEMENT | Age: 60
End: 2020-10-30

## 2020-10-30 NOTE — PROGRESS NOTES
Pt returned to emergency department 10/27 for complaint of knee pain, resolving episode of care from 10/18

## 2021-02-11 RX ORDER — LOSARTAN POTASSIUM 50 MG/1
TABLET ORAL
Qty: 90 TAB | Refills: 3 | Status: SHIPPED | OUTPATIENT
Start: 2021-02-11 | End: 2021-11-18

## 2021-02-11 NOTE — TELEPHONE ENCOUNTER
Future Appointments:  No future appointments.      Last Appointment With Me:  Visit date not found     Requested Prescriptions     Pending Prescriptions Disp Refills    losartan (COZAAR) 50 mg tablet 90 Tab 3     Sig: TAKE 1 TABLET BY MOUTH EVERY DAY

## 2021-02-11 NOTE — TELEPHONE ENCOUNTER
----- Message from Dinorah Munozt sent at 2/11/2021 12:51 PM EST -----  Regarding: Dr. Angelo Mccurdy: [phone]  Medication Refill    Caller (if not patient): N/A      Relationship of caller (if not patient): N/A      Best contact number(s): [phone]      Name of medication and dosage if known: Losartan 50mg      Is patient out of this medication (yes/no): yes      Pharmacy name: [facility]    Pharmacy listed in chart? (yes/no): yes  Pharmacy phone number: [phone]      Details to clarify the request: Pt has been without medication for 2 days      Message from [facility]

## 2021-03-16 ENCOUNTER — TELEPHONE (OUTPATIENT)
Dept: INTERNAL MEDICINE CLINIC | Age: 61
End: 2021-03-16

## 2021-03-16 NOTE — TELEPHONE ENCOUNTER
[facility] is asking for most recent o/n and labs faxed to them as soon as possible as pt is coming in for breast inflammation.     Fax [phone]: Reyna Curling

## 2021-03-18 LAB — MAMMOGRAPHY, EXTERNAL: NORMAL

## 2021-03-23 RX ORDER — SOLIFENACIN SUCCINATE 10 MG/1
TABLET, FILM COATED ORAL
Qty: 30 TAB | Refills: 12 | Status: SHIPPED | OUTPATIENT
Start: 2021-03-23 | End: 2022-04-13

## 2021-04-26 RX ORDER — FUROSEMIDE 20 MG/1
TABLET ORAL
Qty: 90 TAB | Refills: 0 | Status: SHIPPED | OUTPATIENT
Start: 2021-04-26 | End: 2021-09-15

## 2021-05-14 RX ORDER — ROSUVASTATIN CALCIUM 5 MG/1
TABLET, COATED ORAL
Qty: 90 TAB | Refills: 0 | Status: SHIPPED | OUTPATIENT
Start: 2021-05-14 | End: 2021-08-16

## 2021-08-16 RX ORDER — ROSUVASTATIN CALCIUM 5 MG/1
TABLET, COATED ORAL
Qty: 90 TABLET | Refills: 0 | Status: SHIPPED | OUTPATIENT
Start: 2021-08-16 | End: 2021-11-18

## 2021-09-11 RX ORDER — VERAPAMIL HYDROCHLORIDE 240 MG/1
TABLET, FILM COATED, EXTENDED RELEASE ORAL
Qty: 90 TABLET | Refills: 2 | Status: SHIPPED | OUTPATIENT
Start: 2021-09-11 | End: 2022-06-09

## 2021-09-15 RX ORDER — FUROSEMIDE 20 MG/1
TABLET ORAL
Qty: 90 TABLET | Refills: 0 | Status: SHIPPED | OUTPATIENT
Start: 2021-09-15 | End: 2021-11-18

## 2021-11-18 RX ORDER — LOSARTAN POTASSIUM 50 MG/1
TABLET ORAL
Qty: 90 TABLET | Refills: 3 | Status: SHIPPED | OUTPATIENT
Start: 2021-11-18 | End: 2022-03-22 | Stop reason: SDUPTHER

## 2021-11-18 RX ORDER — ROSUVASTATIN CALCIUM 5 MG/1
TABLET, COATED ORAL
Qty: 90 TABLET | Refills: 0 | Status: SHIPPED | OUTPATIENT
Start: 2021-11-18 | End: 2021-12-14

## 2021-11-18 RX ORDER — FUROSEMIDE 20 MG/1
TABLET ORAL
Qty: 90 TABLET | Refills: 0 | Status: SHIPPED | OUTPATIENT
Start: 2021-11-18 | End: 2021-12-14

## 2021-12-14 RX ORDER — FUROSEMIDE 20 MG/1
TABLET ORAL
Qty: 90 TABLET | Refills: 0 | Status: SHIPPED | OUTPATIENT
Start: 2021-12-14 | End: 2022-05-23

## 2021-12-14 RX ORDER — ROSUVASTATIN CALCIUM 5 MG/1
TABLET, COATED ORAL
Qty: 90 TABLET | Refills: 0 | Status: SHIPPED | OUTPATIENT
Start: 2021-12-14 | End: 2022-05-23

## 2022-01-10 ENCOUNTER — TELEPHONE (OUTPATIENT)
Dept: INTERNAL MEDICINE CLINIC | Age: 62
End: 2022-01-10

## 2022-01-10 NOTE — TELEPHONE ENCOUNTER
----- Message from [facility] sent at 1/10/2022  1:48 PM EST -----  Subject: Message to Provider    QUESTIONS  Information for Provider? URGENT- pt needs PCP to contact her regarding a   wheelchair request due to leg problems. please contact back at earliest   convenience. ---------------------------------------------------------------------------  --------------  Eulogio Brown INFO  What is the best way for the office to contact you? OK to leave message on   voicemail  Preferred Call Back Phone Number? [phone]  ---------------------------------------------------------------------------  --------------  SCRIPT ANSWERS  Relationship to Patient?  Self

## 2022-01-11 NOTE — TELEPHONE ENCOUNTER
Called patient. ID verified with Name and . Spoke with patient in regards to message received. Patient scheduled appt with provider at this time in regards to request. No further actions required at this time.

## 2022-01-17 ENCOUNTER — VIRTUAL VISIT (OUTPATIENT)
Dept: INTERNAL MEDICINE CLINIC | Age: 62
End: 2022-01-17
Payer: MEDICARE

## 2022-01-17 DIAGNOSIS — R29.898 WEAKNESS OF BOTH LEGS: Primary | ICD-10-CM

## 2022-01-17 DIAGNOSIS — M54.50 CHRONIC LOW BACK PAIN WITHOUT SCIATICA, UNSPECIFIED BACK PAIN LATERALITY: ICD-10-CM

## 2022-01-17 DIAGNOSIS — G89.29 CHRONIC LOW BACK PAIN WITHOUT SCIATICA, UNSPECIFIED BACK PAIN LATERALITY: ICD-10-CM

## 2022-01-17 PROCEDURE — 99213 OFFICE O/P EST LOW 20 MIN: CPT | Performed by: INTERNAL MEDICINE

## 2022-01-17 RX ORDER — CYCLOBENZAPRINE HCL 10 MG
10 TABLET ORAL
Qty: 30 TABLET | Refills: 1 | Status: SHIPPED | OUTPATIENT
Start: 2022-01-17

## 2022-01-17 NOTE — PATIENT INSTRUCTIONS
This is an established visit conducted via telemedicine. The patient has been instructed that this meets HIPAA criteria and acknowledges and agrees to this method of visitation.     Alannah Escobar [facility]  61/44/87  0:03 PM

## 2022-01-17 NOTE — PROGRESS NOTES
HISTORY OF PRESENT ILLNESS  Tanika Daniel is a 59 y.o. female. HPI     Tanika Daniel, was evaluated through a synchronous (real-time) audio-video encounter. The patient (or guardian if applicable) is aware that this is a billable service. Verbal consent to proceed has been obtained within the past 12 months. The visit was conducted pursuant to the emergency declaration under the [facility] [address] and the [employer] General Act. Patient identification was verified, and a caregiver was present when appropriate. The patient was located in a state where the provider was credentialed to provide care. --Juvenal Trimble MD on 1/17/2022 at 3:45 PM    An electronic signature was used to authenticate this note.     Hx HTN  HLD PSVT morbidobesity subclinical hypothyroidism  C/o difficulty walking x4 months  Her legs give out on her  Needs assist to stand and uses a walker in the house  Has had falls-no injury  Some intermittent low back pain but no sciatica  Weight > 400lbs-no change per pt  Goes to [facility] lymphedema clinic and has compression device for legs        Last OV     C/o left LE edema xweeks  No calf pain  Had had 2 negative venous dopplers in the past for the same problem  The leg edema is worse at end of the day  Has not been elevating the leg much  Sees AFSHAN MILLER for PSVT and bp has been wnl per pt and no recurrence of PSVT recently per pt     Requests diclofenac gel    Patient Active Problem List    Diagnosis Date Noted    Bacterial pneumonia 10/16/2020    Pneumonia 04/03/2019    Subclinical hypothyroidism 12/19/2017    Morbid obesity with body mass index of 60.0-69.9 in adult ([facility] 03/01/2016    KEISHA (obstructive sleep apnea) 10/11/2010    Essential hypertension, benign 02/20/2010    PSVT (paroxysmal supraventricular tachycardia) ([facility] 02/20/2010    Depression 02/20/2010    Carpal tunnel syndrome 02/20/2010    Pure hypercholesterolemia 02/20/2010     Current Outpatient Medications   Medication Sig Dispense Refill    rosuvastatin (CRESTOR) 5 mg tablet TAKE 1 TABLET BY MOUTH EVERY DAY 90 Tablet 0    furosemide (LASIX) 20 mg tablet TAKE 1 TABLET BY MOUTH EVERY DAY 90 Tablet 0    losartan (COZAAR) 50 mg tablet TAKE 1 TABLET BY MOUTH EVERY DAY 90 Tablet 3    verapamil ER (CALAN-SR) 240 mg CR tablet TAKE 1 TABLET BY MOUTH DAILY 90 Tablet 2    solifenacin (VESICARE) 10 mg tablet TAKE 1 TABLET BY MOUTH EVERYDAY 30 Tab 12    aspirin 81 mg chewable tablet Take 81 mg by mouth daily.  diclofenac (VOLTAREN) 1 % gel Apply  to affected area four (4) times daily. 100 g 5    gabapentin (NEURONTIN) 400 mg capsule Take 400 mg by mouth two (2) times a day.  buPROPion XL (Wellbutrin XL) 300 mg XL tablet Take 300 mg by mouth every morning.  flecainide (TAMBOCOR) 100 mg tablet Take 100 mg by mouth daily.  duloxetine (CYMBALTA) 60 mg capsule Take 120 mg by mouth daily.  cyclobenzaprine (FLEXERIL) 5 mg tablet Take 1 Tab by mouth two (2) times a day. 60 Tab 0    levoFLOXacin (LEVAQUIN) 750 mg tablet Take 1 Tab by mouth daily. (Patient not taking: Reported on 1/17/2022) 5 Tab 0    dextromethorphan-quiNIDine (Nuedexta) 20-10 mg per capsule Take 1 Cap by mouth every twelve (12) hours. 1 po tab q12h (Patient not taking: Reported on 1/17/2022)      suvorexant (BELSOMRA) 20 mg tablet Take 20 mg by mouth nightly. (Patient not taking: Reported on 1/17/2022)       Allergies   Allergen Reactions    Atorvastatin Myalgia     Other reaction(s): Myalgia      Codeine Other (comments)     hallucinates  Other reaction(s):  Other (comments)  hallucinates  hallucinates      Penicillins Hives and Other (comments)      Lab Results   Component Value Date/Time    WBC 7.8 10/18/2020 01:37 AM    HGB 11.0 (L) 10/18/2020 01:37 AM    Hemoglobin (POC) 13.3 08/04/2010 09:57 AM    HCT 36.5 10/18/2020 01:37 AM    Hematocrit (POC) 39 08/04/2010 09:57 AM    PLATELET 232 38/82/7502 01:37 AM    MCV 98.1 10/18/2020 01:37 AM     Lab Results   Component Value Date/Time    Hemoglobin A1c 5.6 09/26/2016 10:46 AM    Hemoglobin A1c 5.6 04/29/2015 10:46 AM    Hemoglobin A1c 5.1 12/24/2012 09:51 AM    Glucose 110 (H) 10/18/2020 01:37 AM    Glucose (POC) 110 (H) 08/04/2010 09:57 AM    LDL, calculated 80.8 10/17/2020 01:21 AM    Creatinine (POC) 1.1 08/04/2010 09:57 AM    Creatinine 1.03 (H) 10/18/2020 01:37 AM      Lab Results   Component Value Date/Time    Cholesterol, total 146 10/17/2020 01:21 AM    Cholesterol (POC) 158 05/19/2014 11:54 AM    HDL Cholesterol 53 10/17/2020 01:21 AM    LDL, calculated 80.8 10/17/2020 01:21 AM    LDL Cholesterol (POC) 59 05/19/2014 11:54 AM    Triglyceride 61 10/17/2020 01:21 AM    Triglycerides (POC) 209 05/19/2014 11:54 AM    CHOL/HDL Ratio 2.8 10/17/2020 01:21 AM     Lab Results   Component Value Date/Time    GFR est non-AA 54 (L) 10/18/2020 01:37 AM    GFRNA, POC 55 (L) 08/04/2010 09:57 AM    GFR est AA >60 10/18/2020 01:37 AM    GFRAA, POC >60 08/04/2010 09:57 AM    Creatinine 1.03 (H) 10/18/2020 01:37 AM    Creatinine (POC) 1.1 08/04/2010 09:57 AM    BUN 16 10/18/2020 01:37 AM    BUN (POC) 17 08/04/2010 09:57 AM    Sodium 136 10/18/2020 01:37 AM    Sodium (POC) 141 08/04/2010 09:57 AM    Potassium 4.0 10/18/2020 01:37 AM    Potassium (POC) 4.1 08/04/2010 09:57 AM    Chloride 104 10/18/2020 01:37 AM    Chloride (POC) 106 08/04/2010 09:57 AM    CO2 27 10/18/2020 01:37 AM    Magnesium 1.9 10/17/2020 01:21 AM    Phosphorus 2.5 (L) 10/17/2020 01:21 AM        ROS    Physical Exam  Constitutional:       Appearance: Normal appearance. She is obese. Pulmonary:      Effort: Pulmonary effort is normal.   Neurological:      Mental Status: She is alert. ASSESSMENT and PLAN  Diagnoses and all orders for this visit:    1.  Weakness of both legs  -     MRI LUMB SPINE WO CONT; Future  -     AMB SUPPLY ORDER-bariatric WC  - REFERRAL TO ORTHOPEDICS-Dr Kizzy Quezada    2. Chronic low back pain without sciatica, unspecified back pain laterality  -     MRI LUMB SPINE WO CONT; Future  -     AMB SUPPLY ORDER  -     REFERRAL TO ORTHOPEDICS   Flexeril rx  Other orders  -     cyclobenzaprine (FLEXERIL) 10 mg tablet; Take 1 Tablet by mouth three (3) times daily as needed for Muscle Spasm(s). Follow-up and Dispositions    · Return in about 6 months (around 7/17/2022) for CPE x 30 minutues.

## 2022-01-20 ENCOUNTER — TELEPHONE (OUTPATIENT)
Dept: INTERNAL MEDICINE CLINIC | Age: 62
End: 2022-01-20

## 2022-01-20 NOTE — TELEPHONE ENCOUNTER
Pt called to confirm that the envelope is ready for her to , states its a script for wheelchair. Confirmed pt has envelope waiting at . Pt states her  is going to pick it up for her.  is Jessica Owen, he is on her [employer] dated 10/16/2017    Please be advised he will be coming in today.

## 2022-02-04 ENCOUNTER — TELEPHONE (OUTPATIENT)
Dept: INTERNAL MEDICINE CLINIC | Age: 62
End: 2022-02-04

## 2022-02-04 NOTE — TELEPHONE ENCOUNTER
----- Message from Davy Pierce sent at 2/4/2022 10:22 AM EST -----  Subject: Message to Provider    QUESTIONS  Information for Provider? Patient called requesting to change 2/25 appt to   VV. Advised Physicals had to be in person. Patient stated she has fallen   four times and is not able to come in office. Patient will call back to   possibly cancel if unable to change to VV. Please advise.   ---------------------------------------------------------------------------  --------------  CALL BACK INFO  What is the best way for the office to contact you? OK to leave message on   voicemail  Preferred Call Back Phone Number? [phone]  ---------------------------------------------------------------------------  --------------  SCRIPT ANSWERS  Relationship to Patient?  Self

## 2022-02-04 NOTE — TELEPHONE ENCOUNTER
Called patient. ID verified with Name and . Spoke with patient in regards to message received. Informed patient that provider was okay with patient switching appt to virtual. Patient states that she does not believe she is able to have appt virtually as the appt is for a mobility evaluation. Patient states that she wants to keep appt on the schedule and she would contact insurance company in regards to agencies that could possibly come to her home to do mobility evaluation or if they could provide a service that could assist her in getting to the office for evaluation at this time. Writer confirmed understanding of information received. No further actions required at this time.

## 2022-02-25 ENCOUNTER — VIRTUAL VISIT (OUTPATIENT)
Dept: INTERNAL MEDICINE CLINIC | Age: 62
End: 2022-02-25
Payer: MEDICARE

## 2022-02-25 DIAGNOSIS — R26.2 DIFFICULTY WALKING: Primary | ICD-10-CM

## 2022-02-25 DIAGNOSIS — E66.01 MORBID OBESITY (HCC): ICD-10-CM

## 2022-02-25 DIAGNOSIS — I10 HYPERTENSION, UNSPECIFIED TYPE: ICD-10-CM

## 2022-02-25 DIAGNOSIS — I47.1 PSVT (PAROXYSMAL SUPRAVENTRICULAR TACHYCARDIA) (HCC): ICD-10-CM

## 2022-02-25 DIAGNOSIS — R29.898 WEAKNESS OF BOTH LEGS: ICD-10-CM

## 2022-02-25 PROCEDURE — G8421 BMI NOT CALCULATED: HCPCS | Performed by: INTERNAL MEDICINE

## 2022-02-25 PROCEDURE — 3017F COLORECTAL CA SCREEN DOC REV: CPT | Performed by: INTERNAL MEDICINE

## 2022-02-25 PROCEDURE — G9717 DOC PT DX DEP/BP F/U NT REQ: HCPCS | Performed by: INTERNAL MEDICINE

## 2022-02-25 PROCEDURE — 99213 OFFICE O/P EST LOW 20 MIN: CPT | Performed by: INTERNAL MEDICINE

## 2022-02-25 PROCEDURE — G9899 SCRN MAM PERF RSLTS DOC: HCPCS | Performed by: INTERNAL MEDICINE

## 2022-02-25 PROCEDURE — G8756 NO BP MEASURE DOC: HCPCS | Performed by: INTERNAL MEDICINE

## 2022-02-25 PROCEDURE — G8427 DOCREV CUR MEDS BY ELIG CLIN: HCPCS | Performed by: INTERNAL MEDICINE

## 2022-02-25 RX ORDER — MELOXICAM 7.5 MG/1
7.5 TABLET ORAL 2 TIMES DAILY
COMMUNITY
Start: 2022-01-24

## 2022-02-25 NOTE — PATIENT INSTRUCTIONS
This is an established visit conducted via telemedicine. The patient has been instructed that this meets HIPAA criteria and acknowledges and agrees to this method of visitation.     Ashlee Regalado [facility]  56/72/54  84:34 AM

## 2022-02-25 NOTE — PROGRESS NOTES
HISTORY OF PRESENT ILLNESS  Vitaly Castorena is a 59 y.o. female. HPI     Vitaly Castorena, was evaluated through a synchronous (real-time) audio-video encounter. The patient (or guardian if applicable) is aware that this is a billable service, which includes applicable co-pays. Verbal consent to proceed has been obtained. The visit was conducted pursuant to the emergency declaration under the [address] [place] [address] and the [employer] General Act. Patient identification was verified, and a caregiver was present when appropriate. The patient was located at home in a state where the provider was licensed to provide care. --Bal Washington MD on 2/25/2022 at 11:59 AM    An electronic signature was used to authenticate this note. Here for Mobility exam for PMD  In last 6 months has developed severe leg weakness left worse than right . Legs give out with standing or taking steps  Legs and arms too weak to use walker  Can take a few steps with assistance  Has had a few falls at home  Has dyspnea with standing and walking a few steps  No skin pressure ulcers  Has chrorcic left leg lymphedema  She has mobility limitation that impairs ability to perform MRADLS-toileting, bathing grooming, feeding in customary location in [facility] that would be alleviated by a mobility device she is able to ambulate 10- 50 ft with walker. She does not have th arm strength to use a manual wheelchair. Mobility device is for in home use.   Patient Active Problem List    Diagnosis Date Noted    Bacterial pneumonia 10/16/2020    Pneumonia 04/03/2019    Subclinical hypothyroidism 12/19/2017    Morbid obesity with body mass index of 60.0-69.9 in adult [facility]) 03/01/2016    KEISHA (obstructive sleep apnea) 10/11/2010    Essential hypertension, benign 02/20/2010    PSVT (paroxysmal supraventricular tachycardia) ([facility] 02/20/2010    Depression 02/20/2010  Carpal tunnel syndrome 02/20/2010    Pure hypercholesterolemia 02/20/2010     Current Outpatient Medications   Medication Sig Dispense Refill    meloxicam (MOBIC) 7.5 mg tablet Take 7.5 mg by mouth two (2) times a day.  cyclobenzaprine (FLEXERIL) 10 mg tablet Take 1 Tablet by mouth three (3) times daily as needed for Muscle Spasm(s). 30 Tablet 1    rosuvastatin (CRESTOR) 5 mg tablet TAKE 1 TABLET BY MOUTH EVERY DAY 90 Tablet 0    furosemide (LASIX) 20 mg tablet TAKE 1 TABLET BY MOUTH EVERY DAY 90 Tablet 0    losartan (COZAAR) 50 mg tablet TAKE 1 TABLET BY MOUTH EVERY DAY 90 Tablet 3    verapamil ER (CALAN-SR) 240 mg CR tablet TAKE 1 TABLET BY MOUTH DAILY 90 Tablet 2    solifenacin (VESICARE) 10 mg tablet TAKE 1 TABLET BY MOUTH EVERYDAY 30 Tab 12    aspirin 81 mg chewable tablet Take 81 mg by mouth daily.  diclofenac (VOLTAREN) 1 % gel Apply  to affected area four (4) times daily. 100 g 5    gabapentin (NEURONTIN) 400 mg capsule Take 400 mg by mouth two (2) times a day.  buPROPion XL (Wellbutrin XL) 300 mg XL tablet Take 300 mg by mouth every morning.  flecainide (TAMBOCOR) 100 mg tablet Take 100 mg by mouth daily.  duloxetine (CYMBALTA) 60 mg capsule Take 120 mg by mouth daily.  levoFLOXacin (LEVAQUIN) 750 mg tablet Take 1 Tab by mouth daily. (Patient not taking: Reported on 1/17/2022) 5 Tab 0    dextromethorphan-quiNIDine (Nuedexta) 20-10 mg per capsule Take 1 Cap by mouth every twelve (12) hours. 1 po tab q12h (Patient not taking: Reported on 1/17/2022)      suvorexant (BELSOMRA) 20 mg tablet Take 20 mg by mouth nightly. (Patient not taking: Reported on 1/17/2022)       Allergies   Allergen Reactions    Atorvastatin Myalgia     Other reaction(s): Myalgia      Codeine Other (comments)     hallucinates  Other reaction(s):  Other (comments)  hallucinates  hallucinates      Penicillins Hives and Other (comments)           ROS    Physical Exam   Ht 5ft 8 inches  Weight 475 lbs  Alert and oriented x 3. Respirations-nonlabored  RUE and LUE strength 3/5 b/l  RLE-2/5  LLE 1/5  Gait--needs assist to stand-shuffling gait      Her left leg lymphedema, PSVT ( tachycardia), hypertension and morbid obesity negatively impact her mobility  PMD is necessary to get to bathroom, kitchen and bedroom for ADL's  Pt cannot use a walker or cane as legs are too weak and history of falls  Pt cannot use a manual wheelchair due to arm weakness and severe obesity-unable to propel herself at 475 lbs. Has dyspnea with any activty and blood pressure and PSVT may not be controlled with exertion needed to push herself in wheelchair. She is not able to use a scooter because of lack of postural stability and legs took weak to get on to the seat. She is able to safely physically and mentally operate a power wheelchair  she is willing and motivated to use a power mobility device in her ho,e  ASSESSMENT and PLAN  Diagnoses and all orders for this visit:    1. Difficulty walking   mobility exam completed  2. Weakness of both legs   Pt will get Lumbar MRI when able to get out of  the house  3. Hypertension, unspecified type   Continue medicines  4. PSVT (paroxysmal supraventricular tachycardia) (HCC)   Controlled on flecainide   Polo CAVANAUGH MD  5.  Morbid obesity ([address]        rtc 6 months cpe in person once has PMD chair

## 2022-03-18 PROBLEM — J15.9 BACTERIAL PNEUMONIA: Status: ACTIVE | Noted: 2020-10-16

## 2022-03-19 PROBLEM — J18.9 PNEUMONIA: Status: ACTIVE | Noted: 2019-04-03

## 2022-03-20 PROBLEM — E03.8 SUBCLINICAL HYPOTHYROIDISM: Status: ACTIVE | Noted: 2017-12-19

## 2022-03-22 NOTE — TELEPHONE ENCOUNTER
WRITER RECEIVED PHARMACY NOTICE THAT PATIENTS MEDICATION, LOSARTAN IS ON NATIONAL BACKORDER. PHARMACY REQUESTING ALTERNATIVE AT THIS TIME.

## 2022-03-23 RX ORDER — LOSARTAN POTASSIUM 50 MG/1
50 TABLET ORAL DAILY
Qty: 90 TABLET | Refills: 3 | Status: SHIPPED | OUTPATIENT
Start: 2022-03-23

## 2022-04-13 RX ORDER — SOLIFENACIN SUCCINATE 10 MG/1
TABLET, FILM COATED ORAL
Qty: 30 TABLET | Refills: 12 | Status: SHIPPED | OUTPATIENT
Start: 2022-04-13

## 2022-06-09 RX ORDER — VERAPAMIL HYDROCHLORIDE 240 MG/1
TABLET, FILM COATED, EXTENDED RELEASE ORAL
Qty: 90 TABLET | Refills: 2 | Status: SHIPPED | OUTPATIENT
Start: 2022-06-09

## 2022-08-09 ENCOUNTER — TELEPHONE (OUTPATIENT)
Dept: INTERNAL MEDICINE CLINIC | Age: 62
End: 2022-08-09

## 2022-08-10 NOTE — TELEPHONE ENCOUNTER
Spoke with patient using 2 identifies. Patient  requesting  face  to face exam  VV with  Dr. Jay Hutson for Motorized wheel chair. Carlene failed to complete her order. She is requesting new company .

## 2022-08-12 NOTE — TELEPHONE ENCOUNTER
Spoke with patient using 2 identifiers. Patient was informed that Dr. Celine Alvarez will do a Face to face VV exam in order for patient to get a motorized wheelchair. P johnathon will call back for appointment

## 2022-12-21 ENCOUNTER — VIRTUAL VISIT (OUTPATIENT)
Dept: INTERNAL MEDICINE CLINIC | Age: 62
End: 2022-12-21
Payer: MEDICARE

## 2022-12-21 DIAGNOSIS — H10.33 ACUTE CONJUNCTIVITIS OF BOTH EYES, UNSPECIFIED ACUTE CONJUNCTIVITIS TYPE: Primary | ICD-10-CM

## 2022-12-21 PROCEDURE — G9717 DOC PT DX DEP/BP F/U NT REQ: HCPCS | Performed by: STUDENT IN AN ORGANIZED HEALTH CARE EDUCATION/TRAINING PROGRAM

## 2022-12-21 PROCEDURE — G8756 NO BP MEASURE DOC: HCPCS | Performed by: STUDENT IN AN ORGANIZED HEALTH CARE EDUCATION/TRAINING PROGRAM

## 2022-12-21 PROCEDURE — 99213 OFFICE O/P EST LOW 20 MIN: CPT | Performed by: STUDENT IN AN ORGANIZED HEALTH CARE EDUCATION/TRAINING PROGRAM

## 2022-12-21 PROCEDURE — 3017F COLORECTAL CA SCREEN DOC REV: CPT | Performed by: STUDENT IN AN ORGANIZED HEALTH CARE EDUCATION/TRAINING PROGRAM

## 2022-12-21 PROCEDURE — G9899 SCRN MAM PERF RSLTS DOC: HCPCS | Performed by: STUDENT IN AN ORGANIZED HEALTH CARE EDUCATION/TRAINING PROGRAM

## 2022-12-21 PROCEDURE — G8427 DOCREV CUR MEDS BY ELIG CLIN: HCPCS | Performed by: STUDENT IN AN ORGANIZED HEALTH CARE EDUCATION/TRAINING PROGRAM

## 2022-12-21 RX ORDER — ERYTHROMYCIN 5 MG/G
OINTMENT OPHTHALMIC
Qty: 3.5 G | Refills: 0 | Status: SHIPPED | OUTPATIENT
Start: 2022-12-21

## 2022-12-21 NOTE — PROGRESS NOTES
Identified pt with two pt identifiers(name and ). Reviewed record in preparation for visit and have obtained necessary documentation. Chief Complaint   Patient presents with    Eye Drainage     Both eyes         There were no vitals taken for this visit. Health Maintenance Due   Topic    DTaP/Tdap/Td series (1 - Tdap)    Cervical cancer screen     Colorectal Cancer Screening Combo     COVID-19 Vaccine (3 - Booster for No Peter series)    Lipid Screen     Medicare Yearly Exam     Flu Vaccine (1)       Med Reconciliation: Completed    1. \"Have you been to the ER, urgent care clinic since your last visit? Hospitalized since your last visit? \" No    2. \"Have you seen or consulted any other health care providers outside of the [address] [place] [address] since your last visit? \" No     3. For patients aged 39-70: Has the patient had a colonoscopy / FIT/ Cologuard? No      If the patient is female:    4. For patients aged 41-77: Has the patient had a mammogram within the past 2 years? No      5. For patients aged 21-65: Has the patient had a pap smear? No       This is an established visit conducted via telemedicine. The patient has been instructed that this meets HIPAA criteria and acknowledges and agrees to this method of visitation.     Brent Putnam LPN  /99/56  2:88 AM

## 2022-12-21 NOTE — PROGRESS NOTES
Good Help to Those in [facility]   Internal Medicine  [address] [facility] [address], [address] [facility]  [address]  [facility] 200 [facility]  [phone]      This is an established visit conducted via telemedicine with video. The patient has been instructed that this meets HIPAA criteria and acknowledges and agrees to this method of visitation. Pursuant to the emergency declaration under the [address] [place] [address], [facility] authority and the [employer] [place] and [employer] Act, this Virtual Visit was conducted, with patient's consent, to reduce the patient's risk of exposure to COVID-19 and provide continuity of care for an established patient. Services were provided through a video synchronous discussion virtually to substitute for in-person clinic visit. Kamron Joyce, who was evaluated through a synchronous (real-time) audio-video encounter, and/or her healthcare decision maker, is aware that it is a billable service, which includes applicable co-pays, with coverage as determined by her insurance carrier. She provided verbal consent to proceed and patient identification was verified. This visit was conducted pursuant to the emergency declaration under the [address] [place] [address], [address] [place] [address] authority and the [employer] and [employer] Act. A caregiver was present when appropriate. Ability to conduct physical exam was limited. The patient was located at: Home: [address] [place] [id number]  The provider was located at: [facility] [place] [id number]    --Todd Fernández MD on 12/21/2022 at 9:05 AM                Primary Care Visit Note    Assessment/Plan:     Diagnoses and all orders for this visit:    1.  Acute conjunctivitis of both eyes, unspecified acute conjunctivitis type  -     erythromycin (ILOTYCIN) ophthalmic ointment; Instill ~1 cm ribbon into affected eye(s) 4 times daily for 7 days  - worsening after 5 days, therefore will treat with topical abx  - pt to call office if not improving over next few days with abx. Racheal Carranza MD    CC:     Chief Complaint   Patient presents with    Eye Drainage     Both eyes        HPI:     Moe Connors is a 57 y.o. female who presents for evaluation of pink eye. Pt c/o redness and drainage of the eye. Eyes are sticking closed, draining, and itching  - right eye started on Saturday then on Monday sx started in L eye, and have been continuing to worsen. - has had some slight blurring of vision, able to move eyes normally, no bulging of eyes. ROS:   All 10 point review of systems negative except those mentioned in the HPI. Past Medical History:      Active Ambulatory Problems     Diagnosis Date Noted    Essential hypertension, benign 02/20/2010    PSVT (paroxysmal supraventricular tachycardia) ([address] 02/20/2010    Depression 02/20/2010    Carpal tunnel syndrome 02/20/2010    Pure hypercholesterolemia 02/20/2010    KEISHA (obstructive sleep apnea) 10/11/2010    Morbid obesity with body mass index of 60.0-69.9 in adult [facility]) 03/01/2016    Subclinical hypothyroidism 12/19/2017    Pneumonia 04/03/2019    Bacterial pneumonia 10/16/2020     Resolved Ambulatory Problems     Diagnosis Date Noted    Obesity 02/20/2010     Past Medical History:   Diagnosis Date    Cholelithiasis     GERD (gastroesophageal reflux disease)     High cholesterol     Hypertension     Lymphedema of left leg     Migraines     Morbid obesity ([address]     Psychiatric disorder     Unspecified adverse effect of anesthesia May 2006          Current Medications:     Current Outpatient Medications:     verapamil ER (CALAN-SR) 240 mg CR tablet, TAKE 1 TABLET BY MOUTH DAILY, Disp: 90 Tablet, Rfl: 2    rosuvastatin (CRESTOR) 5 mg tablet, TAKE 1 TABLET BY MOUTH EVERY DAY, Disp: 90 Tablet, Rfl: 3 furosemide (LASIX) 20 mg tablet, TAKE 1 TABLET BY MOUTH EVERY DAY, Disp: 90 Tablet, Rfl: 3    solifenacin (VESICARE) 10 mg tablet, TAKE 1 TABLET BY MOUTH EVERYDAY, Disp: 30 Tablet, Rfl: 12    losartan (COZAAR) 50 mg tablet, Take 1 Tablet by mouth daily. , Disp: 90 Tablet, Rfl: 3    meloxicam (MOBIC) 7.5 mg tablet, Take 7.5 mg by mouth two (2) times a day., Disp: , Rfl:     cyclobenzaprine (FLEXERIL) 10 mg tablet, Take 1 Tablet by mouth three (3) times daily as needed for Muscle Spasm(s). , Disp: 30 Tablet, Rfl: 1    aspirin 81 mg chewable tablet, Take 81 mg by mouth daily. , Disp: , Rfl:     buPROPion XL (Wellbutrin XL) 300 mg XL tablet, Take 300 mg by mouth every morning., Disp: , Rfl:     flecainide (TAMBOCOR) 100 mg tablet, Take 100 mg by mouth daily. , Disp: , Rfl:     DULoxetine (CYMBALTA) 60 mg capsule, Take 120 mg by mouth daily. , Disp: , Rfl:     diclofenac (VOLTAREN) 1 % gel, Apply  to affected area four (4) times daily. (Patient taking differently: Apply  to affected area four (4) times daily.  As needed), Disp: 100 g, Rfl: 5      Past Surgical History:     Past Surgical History:   Procedure Laterality Date    DILATION AND CURETTAGE      HX  SECTION      HX CHOLECYSTECTOMY      HX CYST INCISION AND DRAINAGE Right 2014    HX DILATION AND CURETTAGE  May 2006    HX GYN      Uterine Ablation    HX TONSIL AND ADENOIDECTOMY      MA REMOVAL GALLBLADDER           Family History:     Family History   Problem Relation Age of Onset    Cancer Mother         Lung or breast, patient unsure    Heart Attack Father     Diabetes Sister          Social History:     Social History     Socioeconomic History    Marital status:      Spouse name: Not on file    Number of children: Not on file    Years of education: Not on file    Highest education level: Not on file   Occupational History    Not on file   Tobacco Use    Smoking status: Never    Smokeless tobacco: Never   Substance and Sexual Activity Alcohol use: Yes     Alcohol/week: 1.0 standard drink     Types: 1 Glasses of wine per week     Comment: seldom    Drug use: Yes     Types: Prescription, OTC    Sexual activity: Yes     Partners: Male     Birth control/protection: None   Other Topics Concern    Not on file   Social History Narrative    Not on file     Social Determinants of Health     Financial Resource Strain: Not on file   Food Insecurity: Not on file   Transportation Needs: Not on file   Physical Activity: Not on file   Stress: Not on file   Social Connections: Not on file   Intimate Partner Violence: Not on file   Housing Stability: Not on file            There were no vitals taken for this visit. Physical Exam:   General - Well appearing  HEENT - eomi, bilateral conjunctivitis, normal external ears and nose, MMM  Neck - normal appearing neck  Pulm - speaking in full sentences, no respiratory distress  Neuro-  Alert and oriented, No obvious focal deficits  Psych - normal mood and affect      Labs/Imaging:     Labs and imaging reviewed by me and significant for:    Lab Results   Component Value Date/Time    WBC 7.8 10/18/2020 01:37 AM    Hemoglobin (POC) 13.3 08/04/2010 09:57 AM    HGB 11.0 (L) 10/18/2020 01:37 AM    Hematocrit (POC) 39 08/04/2010 09:57 AM    HCT 36.5 10/18/2020 01:37 AM    PLATELET 891 39/88/9484 01:37 AM    MCV 98.1 10/18/2020 01:37 AM     Lab Results   Component Value Date/Time    Sodium 136 10/18/2020 01:37 AM    Potassium 4.0 10/18/2020 01:37 AM    Chloride 104 10/18/2020 01:37 AM    CO2 27 10/18/2020 01:37 AM    Anion gap 5 10/18/2020 01:37 AM    Glucose 110 (H) 10/18/2020 01:37 AM    BUN 16 10/18/2020 01:37 AM    Creatinine 1.03 (H) 10/18/2020 01:37 AM    BUN/Creatinine ratio 16 10/18/2020 01:37 AM    GFR est AA >60 10/18/2020 01:37 AM    GFR est non-AA 54 (L) 10/18/2020 01:37 AM    Calcium 8.8 10/18/2020 01:37 AM    Bilirubin, total 0.6 10/17/2020 01:21 AM    Alk.  phosphatase 78 10/17/2020 01:21 AM    Protein, total 7.1 10/17/2020 01:21 AM    Albumin 2.9 (L) 10/17/2020 01:21 AM    Globulin 4.2 (H) 10/17/2020 01:21 AM    A-G Ratio 0.7 (L) 10/17/2020 01:21 AM    ALT (SGPT) 18 10/17/2020 01:21 AM    AST (SGOT) 22 10/17/2020 01:21 AM

## 2023-02-12 RX ORDER — ROSUVASTATIN CALCIUM 5 MG/1
TABLET, COATED ORAL
Qty: 90 TABLET | Refills: 3 | Status: SHIPPED | OUTPATIENT
Start: 2023-02-12

## 2023-03-10 RX ORDER — VERAPAMIL HYDROCHLORIDE 240 MG/1
TABLET, FILM COATED, EXTENDED RELEASE ORAL
Qty: 90 TABLET | Refills: 2 | Status: SHIPPED | OUTPATIENT
Start: 2023-03-10

## 2023-04-03 RX ORDER — LOSARTAN POTASSIUM 50 MG/1
TABLET ORAL
Qty: 90 TABLET | Refills: 3 | Status: SHIPPED | OUTPATIENT
Start: 2023-04-03

## 2023-04-04 RX ORDER — CYCLOBENZAPRINE HCL 10 MG
TABLET ORAL
Qty: 30 TABLET | Refills: 1 | Status: SHIPPED
Start: 2023-04-04

## 2023-04-17 RX ORDER — FLECAINIDE ACETATE 100 MG/1
TABLET ORAL
Qty: 60 TABLET | Refills: 0 | Status: SHIPPED | OUTPATIENT
Start: 2023-04-17

## 2023-05-11 RX ORDER — FLECAINIDE ACETATE 100 MG/1
TABLET ORAL
Qty: 60 TABLET | Refills: 0 | Status: SHIPPED | OUTPATIENT
Start: 2023-05-11

## 2023-05-11 NOTE — TELEPHONE ENCOUNTER
PCP: Prince Heriberto MD    Last appt:   12/21/2022    No future appointments.     Requested Prescriptions     Pending Prescriptions Disp Refills    flecainide (TAMBOCOR) 100 MG tablet [Pharmacy Med Name: FLECAINIDE ACETATE 100 MG TAB] 60 tablet 0     Sig: TAKE 1 TABLET BY MOUTH EVERY 12 HOURS

## 2023-05-31 RX ORDER — FUROSEMIDE 20 MG/1
TABLET ORAL
Qty: 90 TABLET | Refills: 3 | Status: SHIPPED | OUTPATIENT
Start: 2023-05-31

## 2023-06-06 RX ORDER — FLECAINIDE ACETATE 100 MG/1
TABLET ORAL
Qty: 60 TABLET | Refills: 0 | Status: SHIPPED | OUTPATIENT
Start: 2023-06-06

## 2023-07-07 RX ORDER — FLECAINIDE ACETATE 100 MG/1
TABLET ORAL
Qty: 60 TABLET | Refills: 0 | Status: SHIPPED | OUTPATIENT
Start: 2023-07-07

## 2023-08-07 RX ORDER — FLECAINIDE ACETATE 100 MG/1
TABLET ORAL
Qty: 60 TABLET | Refills: 0 | Status: SHIPPED | OUTPATIENT
Start: 2023-08-07

## 2023-08-09 ENCOUNTER — TELEPHONE (OUTPATIENT)
Age: 63
End: 2023-08-09

## 2023-08-09 RX ORDER — NYSTATIN 100000 [USP'U]/G
POWDER TOPICAL
Qty: 30 G | Refills: 1 | Status: SHIPPED | OUTPATIENT
Start: 2023-08-09

## 2023-08-09 NOTE — TELEPHONE ENCOUNTER
Patient has a rash to her armpits    States she has had this before and is requesting for a refill on Nystop powder  States she has not had this prescribed in 3 years       Pharmacy on file is correct

## 2023-08-23 RX ORDER — FLECAINIDE ACETATE 100 MG/1
100 TABLET ORAL EVERY 12 HOURS
Qty: 30 TABLET | Refills: 0 | Status: SHIPPED | OUTPATIENT
Start: 2023-08-23

## 2023-08-23 NOTE — TELEPHONE ENCOUNTER
PCP: Jose Lambert MD    Last appt: 12/21/2022   No future appointments. Requested Prescriptions     Pending Prescriptions Disp Refills    flecainide (TAMBOCOR) 100 MG tablet 90 tablet 0     Sig: Take 1 tablet by mouth in the morning and 1 tablet in the evening.

## 2023-08-24 NOTE — TELEPHONE ENCOUNTER
Informed patient that her script for Flecainide was sent to her pharmacy and advised her to schedule appointment with Cardiologist.  Offered to schedule her wellness visit. Patient stated she is having trouble walking and will have to arrange transportation. She said she would make this appointment within the next 30 days.

## 2023-08-29 NOTE — TELEPHONE ENCOUNTER
PCP: Natalia Barber MD    Last appt: 2/25/2022  No future appointments. Requested Prescriptions     Pending Prescriptions Disp Refills    flecainide (TAMBOCOR) 100 MG tablet 180 tablet 3     Sig: Take 1 tablet by mouth in the morning and 1 tablet in the evening.

## 2023-08-30 RX ORDER — FLECAINIDE ACETATE 100 MG/1
100 TABLET ORAL EVERY 12 HOURS
Qty: 180 TABLET | Refills: 3 | OUTPATIENT
Start: 2023-08-30

## 2023-08-31 NOTE — TELEPHONE ENCOUNTER
Patient states to disregard the refill request     Her cardiologists fills for her and she is not sure why [facility] keeps sending the request to PCP    Patient notified she is past due for an appt with PCP  States she will call back to schedule an appt once she has transportation

## 2023-09-13 NOTE — TELEPHONE ENCOUNTER
Pt needs a 30 min appt for dr. Matteo Fields states that she can do 10/16/17 or 10/20/17 in the am. Please all and advise. 40

## 2023-10-16 ENCOUNTER — TELEPHONE (OUTPATIENT)
Age: 63
End: 2023-10-16

## 2023-10-16 NOTE — TELEPHONE ENCOUNTER
Appointment not available in the am on 10/19. Added patient to wait list. Patient is scheduled for 10/31 for now.

## 2023-10-16 NOTE — TELEPHONE ENCOUNTER
----- Message from Adilson Umana sent at 10/16/2023 11:40 AM EDT -----  Subject: Appointment Request    Reason for Call: Established Patient Appointment needed: Routine Medicare   AWV    QUESTIONS    Reason for appointment request? No appointments available during search     Additional Information for Provider?  Patient phoned would like her AWV on   10/19/23 before 12pm - no appointments available  ---------------------------------------------------------------------------  --------------  [address]  [phone]; OK to leave message on voicemail  ---------------------------------------------------------------------------  --------------  SCRIPT ANSWERS

## 2023-11-10 RX ORDER — VERAPAMIL HYDROCHLORIDE 240 MG/1
TABLET, FILM COATED, EXTENDED RELEASE ORAL
Qty: 90 TABLET | Refills: 2 | Status: SHIPPED | OUTPATIENT
Start: 2023-11-10

## 2024-01-16 ENCOUNTER — TELEPHONE (OUTPATIENT)
Age: 64
End: 2024-01-16

## 2024-01-16 DIAGNOSIS — F41.9 ANXIETY: Primary | ICD-10-CM

## 2024-01-16 RX ORDER — LORAZEPAM 0.5 MG/1
0.5 TABLET ORAL NIGHTLY PRN
Qty: 15 TABLET | Refills: 0 | Status: SHIPPED | OUTPATIENT
Start: 2024-01-16 | End: 2024-02-16

## 2024-01-16 NOTE — TELEPHONE ENCOUNTER
Pt states that she would like something for anxiety called into the pharmacy for her.  Pt's daughter was murdered last week and she isn't doing the best.     Something low dose to help with sleep as the  is this coming 24    Please send to [facility] #[phone]    Please call pt to let her know what Dr. Mar will do.

## 2024-01-30 RX ORDER — FLECAINIDE ACETATE 100 MG/1
100 TABLET ORAL EVERY 12 HOURS
Qty: 60 TABLET | Refills: 0 | Status: SHIPPED | OUTPATIENT
Start: 2024-01-30

## 2024-02-26 RX ORDER — FLECAINIDE ACETATE 100 MG/1
100 TABLET ORAL EVERY 12 HOURS
Qty: 90 TABLET | Refills: 0 | Status: SHIPPED | OUTPATIENT
Start: 2024-02-26

## 2024-02-26 NOTE — TELEPHONE ENCOUNTER
Spoke with patient. Advised rx sent to p pharmacy. She is aware needs to come from cardiologist and has been trying to get pharmacy to stop sending refill request.

## 2024-02-26 NOTE — TELEPHONE ENCOUNTER
Received faxed refill request from pharmacy      PCP: Bharath Mar MD    Last appt: 2/25/2022  No future appointments.    Requested Prescriptions     Pending Prescriptions Disp Refills    flecainide (TAMBOCOR) 100 MG tablet 90 tablet 0     Sig: Take 1 tablet by mouth in the morning and 1 tablet in the evening.

## 2024-03-07 RX ORDER — ROSUVASTATIN CALCIUM 5 MG/1
TABLET, COATED ORAL
Qty: 90 TABLET | Refills: 3 | Status: SHIPPED | OUTPATIENT
Start: 2024-03-07

## 2024-04-02 RX ORDER — LOSARTAN POTASSIUM 50 MG/1
50 TABLET ORAL DAILY
Qty: 90 TABLET | Refills: 3 | Status: SHIPPED | OUTPATIENT
Start: 2024-04-02

## 2024-04-30 ENCOUNTER — PATIENT MESSAGE (OUTPATIENT)
Age: 64
End: 2024-04-30

## 2024-04-30 DIAGNOSIS — E66.01 MORBID OBESITY WITH BODY MASS INDEX OF 60.0-69.9 IN ADULT (HCC): Primary | ICD-10-CM

## 2024-05-06 RX ORDER — SOLIFENACIN SUCCINATE 10 MG/1
10 TABLET, FILM COATED ORAL DAILY
Qty: 90 TABLET | Refills: 4 | Status: SHIPPED | OUTPATIENT
Start: 2024-05-06

## 2024-05-06 RX ORDER — FUROSEMIDE 20 MG/1
TABLET ORAL
Qty: 90 TABLET | Refills: 0 | Status: SHIPPED | OUTPATIENT
Start: 2024-05-06

## 2024-05-21 ENCOUNTER — TELEPHONE (OUTPATIENT)
Age: 64
End: 2024-05-21

## 2024-05-21 NOTE — TELEPHONE ENCOUNTER
Patient called about getting hoverround chair and she knows she needs an appointment. Patient wants to know if the appointment needs to be in office or if it can be virtual. Please let patient know [phone]

## 2024-05-22 NOTE — TELEPHONE ENCOUNTER
Appointment scheduled for 5/31. Patient will notify office if she is having trouble getting transportation.

## 2024-05-28 SDOH — ECONOMIC STABILITY: FOOD INSECURITY: WITHIN THE PAST 12 MONTHS, THE FOOD YOU BOUGHT JUST DIDN'T LAST AND YOU DIDN'T HAVE MONEY TO GET MORE.: NEVER TRUE

## 2024-05-28 SDOH — ECONOMIC STABILITY: HOUSING INSECURITY
IN THE LAST 12 MONTHS, WAS THERE A TIME WHEN YOU DID NOT HAVE A STEADY PLACE TO SLEEP OR SLEPT IN A SHELTER (INCLUDING NOW)?: NO

## 2024-05-28 SDOH — ECONOMIC STABILITY: TRANSPORTATION INSECURITY
IN THE PAST 12 MONTHS, HAS LACK OF TRANSPORTATION KEPT YOU FROM MEETINGS, WORK, OR FROM GETTING THINGS NEEDED FOR DAILY LIVING?: YES

## 2024-05-28 SDOH — ECONOMIC STABILITY: FOOD INSECURITY: WITHIN THE PAST 12 MONTHS, YOU WORRIED THAT YOUR FOOD WOULD RUN OUT BEFORE YOU GOT MONEY TO BUY MORE.: SOMETIMES TRUE

## 2024-05-28 SDOH — HEALTH STABILITY: PHYSICAL HEALTH: ON AVERAGE, HOW MANY DAYS PER WEEK DO YOU ENGAGE IN MODERATE TO STRENUOUS EXERCISE (LIKE A BRISK WALK)?: 0 DAYS

## 2024-05-28 ASSESSMENT — COLUMBIA-SUICIDE SEVERITY RATING SCALE - C-SSRS
6. HAVE YOU EVER DONE ANYTHING, STARTED TO DO ANYTHING, OR PREPARED TO DO ANYTHING TO END YOUR LIFE?: NO
2. HAVE YOU ACTUALLY HAD ANY THOUGHTS OF KILLING YOURSELF?: YES
5. HAVE YOU STARTED TO WORK OUT OR WORKED OUT THE DETAILS OF HOW TO KILL YOURSELF? DO YOU INTEND TO CARRY OUT THIS PLAN?: NO
4. HAVE YOU HAD THESE THOUGHTS AND HAD SOME INTENTION OF ACTING ON THEM?: NO
3. HAVE YOU BEEN THINKING ABOUT HOW YOU MIGHT KILL YOURSELF?: NO

## 2024-05-28 ASSESSMENT — PATIENT HEALTH QUESTIONNAIRE - PHQ9
2. FEELING DOWN, DEPRESSED OR HOPELESS: SEVERAL DAYS
SUM OF ALL RESPONSES TO PHQ QUESTIONS 1-9: 19
SUM OF ALL RESPONSES TO PHQ QUESTIONS 1-9: 19
4. FEELING TIRED OR HAVING LITTLE ENERGY: NEARLY EVERY DAY
10. IF YOU CHECKED OFF ANY PROBLEMS, HOW DIFFICULT HAVE THESE PROBLEMS MADE IT FOR YOU TO DO YOUR WORK, TAKE CARE OF THINGS AT HOME, OR GET ALONG WITH OTHER PEOPLE: EXTREMELY DIFFICULT
8. MOVING OR SPEAKING SO SLOWLY THAT OTHER PEOPLE COULD HAVE NOTICED. OR THE OPPOSITE, BEING SO FIGETY OR RESTLESS THAT YOU HAVE BEEN MOVING AROUND A LOT MORE THAN USUAL: NEARLY EVERY DAY
3. TROUBLE FALLING OR STAYING ASLEEP: NEARLY EVERY DAY
5. POOR APPETITE OR OVEREATING: SEVERAL DAYS
6. FEELING BAD ABOUT YOURSELF - OR THAT YOU ARE A FAILURE OR HAVE LET YOURSELF OR YOUR FAMILY DOWN: NEARLY EVERY DAY
SUM OF ALL RESPONSES TO PHQ QUESTIONS 1-9: 19
SUM OF ALL RESPONSES TO PHQ9 QUESTIONS 1 & 2: 2
7. TROUBLE CONCENTRATING ON THINGS, SUCH AS READING THE NEWSPAPER OR WATCHING TELEVISION: NEARLY EVERY DAY
1. LITTLE INTEREST OR PLEASURE IN DOING THINGS: SEVERAL DAYS
9. THOUGHTS THAT YOU WOULD BE BETTER OFF DEAD, OR OF HURTING YOURSELF: SEVERAL DAYS
SUM OF ALL RESPONSES TO PHQ QUESTIONS 1-9: 18

## 2024-05-28 ASSESSMENT — LIFESTYLE VARIABLES
HOW OFTEN DO YOU HAVE SIX OR MORE DRINKS ON ONE OCCASION: 1
HOW MANY STANDARD DRINKS CONTAINING ALCOHOL DO YOU HAVE ON A TYPICAL DAY: PATIENT DOES NOT DRINK
HOW MANY STANDARD DRINKS CONTAINING ALCOHOL DO YOU HAVE ON A TYPICAL DAY: 0

## 2024-05-31 ENCOUNTER — OFFICE VISIT (OUTPATIENT)
Age: 64
End: 2024-05-31
Payer: COMMERCIAL

## 2024-05-31 VITALS
RESPIRATION RATE: 20 BRPM | HEART RATE: 94 BPM | TEMPERATURE: 97.2 F | DIASTOLIC BLOOD PRESSURE: 80 MMHG | OXYGEN SATURATION: 95 % | HEIGHT: 69 IN | SYSTOLIC BLOOD PRESSURE: 128 MMHG

## 2024-05-31 DIAGNOSIS — R73.09 ELEVATED GLUCOSE: ICD-10-CM

## 2024-05-31 DIAGNOSIS — I89.0 LYMPHEDEMA: ICD-10-CM

## 2024-05-31 DIAGNOSIS — E03.9 ADULT HYPOTHYROIDISM: ICD-10-CM

## 2024-05-31 DIAGNOSIS — Z12.11 COLON CANCER SCREENING: ICD-10-CM

## 2024-05-31 DIAGNOSIS — Z00.00 MEDICARE ANNUAL WELLNESS VISIT, SUBSEQUENT: ICD-10-CM

## 2024-05-31 DIAGNOSIS — I47.10 PSVT (PAROXYSMAL SUPRAVENTRICULAR TACHYCARDIA) (HCC): ICD-10-CM

## 2024-05-31 DIAGNOSIS — E66.01 MORBID OBESITY (HCC): ICD-10-CM

## 2024-05-31 DIAGNOSIS — J96.91 RESPIRATORY FAILURE WITH HYPOXIA, UNSPECIFIED CHRONICITY (HCC): ICD-10-CM

## 2024-05-31 DIAGNOSIS — F32.A DEPRESSION, UNSPECIFIED DEPRESSION TYPE: ICD-10-CM

## 2024-05-31 DIAGNOSIS — Z23 ENCOUNTER FOR IMMUNIZATION: Primary | ICD-10-CM

## 2024-05-31 DIAGNOSIS — I10 HYPERTENSION, UNSPECIFIED TYPE: ICD-10-CM

## 2024-05-31 DIAGNOSIS — E78.00 PURE HYPERCHOLESTEROLEMIA: ICD-10-CM

## 2024-05-31 LAB
COMMENT:: NORMAL
SPECIMEN HOLD: NORMAL

## 2024-05-31 PROCEDURE — 3074F SYST BP LT 130 MM HG: CPT | Performed by: INTERNAL MEDICINE

## 2024-05-31 PROCEDURE — 3079F DIAST BP 80-89 MM HG: CPT | Performed by: INTERNAL MEDICINE

## 2024-05-31 PROCEDURE — 90471 IMMUNIZATION ADMIN: CPT | Performed by: INTERNAL MEDICINE

## 2024-05-31 PROCEDURE — G0439 PPPS, SUBSEQ VISIT: HCPCS | Performed by: INTERNAL MEDICINE

## 2024-05-31 PROCEDURE — 1123F ACP DISCUSS/DSCN MKR DOCD: CPT | Performed by: INTERNAL MEDICINE

## 2024-05-31 PROCEDURE — 90677 PCV20 VACCINE IM: CPT | Performed by: INTERNAL MEDICINE

## 2024-05-31 PROCEDURE — 99215 OFFICE O/P EST HI 40 MIN: CPT | Performed by: INTERNAL MEDICINE

## 2024-05-31 RX ORDER — ZOLPIDEM TARTRATE 12.5 MG/1
12.5 TABLET, FILM COATED, EXTENDED RELEASE ORAL NIGHTLY
COMMUNITY
Start: 2024-05-02

## 2024-05-31 SDOH — ECONOMIC STABILITY: FOOD INSECURITY: WITHIN THE PAST 12 MONTHS, THE FOOD YOU BOUGHT JUST DIDN'T LAST AND YOU DIDN'T HAVE MONEY TO GET MORE.: NEVER TRUE

## 2024-05-31 SDOH — ECONOMIC STABILITY: FOOD INSECURITY: WITHIN THE PAST 12 MONTHS, YOU WORRIED THAT YOUR FOOD WOULD RUN OUT BEFORE YOU GOT MONEY TO BUY MORE.: SOMETIMES TRUE

## 2024-05-31 SDOH — ECONOMIC STABILITY: INCOME INSECURITY: HOW HARD IS IT FOR YOU TO PAY FOR THE VERY BASICS LIKE FOOD, HOUSING, MEDICAL CARE, AND HEATING?: VERY HARD

## 2024-05-31 NOTE — PROGRESS NOTES
HISTORY OF PRESENT ILLNESS   Sylvia Simms   is a 64 y.o.  female.  Hx HTN  HLD PSVT morbid obesity subclinical hypothyroidism and wellness--here with     EP Dr SYLVAIN Carrillo-on flecainide and verapamil  --palpitations < 1 months--had OV last November--yearly fu  Psych Dr LEONA Inman for anxiety and depression -on cymbalta, rexulti and ambien  Her daughter passed Jan 1 this year and she is very stressed about the loss    Having severe mobility restrictions---legs give out -can only walk about 4 steps  Gets SOB with any walking for past 1 year  No cp   Needs power mobility device to use in the house and on ramp to her house  Unable to propel a wheelchair    Has chronic lymphedema of legs    Last mammogr=sally at [facility] > 1 yr ago-had right breast/nipple bx -negative per pt    Last ov  C/o difficulty walking x4 months  Her legs give out on her  Needs assist to stand and uses a walker in the house  Has had falls-no injury  Some intermittent low back pain but no sciatica  Weight > 400lbs-no change per pt  Goes to [facility] lymphedema clinic and has compression device for legs     Patient Active Problem List    Diagnosis Date Noted    Bacterial pneumonia 10/16/2020    Pneumonia 04/03/2019    Subclinical hypothyroidism 12/19/2017    Morbid obesity with body mass index of 60.0-69.9 in adult ([facility]) 03/01/2016    LINDA (obstructive sleep apnea) 10/11/2010    Carpal tunnel syndrome 02/20/2010    Depression 02/20/2010    Pure hypercholesterolemia 02/20/2010    PSVT (paroxysmal supraventricular tachycardia) ([facility]) 02/20/2010    Essential hypertension, benign 02/20/2010     Current Outpatient Medications   Medication Sig Dispense Refill    zolpidem (AMBIEN CR) 12.5 MG extended release tablet Take 1 tablet by mouth nightly.      brexpiprazole (REXULTI) 1 MG TABS tablet       furosemide (LASIX) 20 MG tablet TAKE 1 TABLET BY MOUTH EVERY DAY 90 tablet 0    solifenacin (VESICARE) 10 MG tablet TAKE 1 TABLET BY MOUTH EVERY DAY 90 tablet 4

## 2024-05-31 NOTE — PROGRESS NOTES
\"Have you been to the ER, urgent care clinic since your last visit?  Hospitalized since your last visit?\"    NO    “Have you seen or consulted any other health care providers outside of [facility] since your last visit?”    Psychiatrist     Have you had a mammogram?”   NO    Date of last Mammogram: 3/18/2021         “Have you had a colorectal cancer screening such as a colonoscopy/FIT/Cologuard?    NO    No colonoscopy on file  No cologuard on file  Date of last FIT: 8/14/2019   No flexible sigmoidoscopy on file         Click Here for Release of Records Request

## 2024-05-31 NOTE — PATIENT INSTRUCTIONS
amount directed each day. Make sure you take aspirin and not another kind of pain reliever, such as acetaminophen (Tylenol).   When should you call for help?   Call 911 if you have symptoms of a heart attack. These may include:    Chest pain or pressure, or a strange feeling in the chest.     Sweating.     Shortness of breath.     Pain, pressure, or a strange feeling in the back, neck, jaw, or upper belly or in one or both shoulders or arms.     Lightheadedness or sudden weakness.     A fast or irregular heartbeat.   After you call 911, the  may tell you to chew 1 adult-strength or 2 to 4 low-dose aspirin. Wait for an ambulance. Do not try to drive yourself.  Watch closely for changes in your health, and be sure to contact your doctor if you have any problems.  Where can you learn more?  Go to https://www.[employer].net/patientEd and enter F075 to learn more about \"A Healthy Heart: Care Instructions.\"  Current as of: June 24, 2023               Content Version: 14.0  © 4634-9204 [employer].   Care instructions adapted under license by [employer]. If you have questions about a medical condition or this instruction, always ask your healthcare professional. [employer] disclaims any warranty or liability for your use of this information.      Personalized Preventive Plan for Sylvia Simms - 5/31/2024  Medicare offers a range of preventive health benefits. Some of the tests and screenings are paid in full while other may be subject to a deductible, co-insurance, and/or copay.    Some of these benefits include a comprehensive review of your medical history including lifestyle, illnesses that may run in your family, and various assessments and screenings as appropriate.    After reviewing your medical record and screening and assessments performed today your provider may have ordered immunizations, labs, imaging, and/or referrals for you.  A list of these orders (if applicable) as

## 2024-06-01 LAB
ALBUMIN SERPL-MCNC: 3.7 G/DL (ref 3.5–5)
ALBUMIN/GLOB SERPL: 0.9 (ref 1.1–2.2)
ALP SERPL-CCNC: 98 U/L (ref 45–117)
ALT SERPL-CCNC: 22 U/L (ref 12–78)
ANION GAP SERPL CALC-SCNC: 2 MMOL/L (ref 5–15)
AST SERPL-CCNC: 28 U/L (ref 15–37)
BILIRUB SERPL-MCNC: 0.4 MG/DL (ref 0.2–1)
BUN SERPL-MCNC: 22 MG/DL (ref 6–20)
BUN/CREAT SERPL: 22 (ref 12–20)
CALCIUM SERPL-MCNC: 9.2 MG/DL (ref 8.5–10.1)
CHLORIDE SERPL-SCNC: 106 MMOL/L (ref 97–108)
CHOLEST SERPL-MCNC: 138 MG/DL
CO2 SERPL-SCNC: 32 MMOL/L (ref 21–32)
CREAT SERPL-MCNC: 1.02 MG/DL (ref 0.55–1.02)
ERYTHROCYTE [DISTWIDTH] IN BLOOD BY AUTOMATED COUNT: 14.1 % (ref 11.5–14.5)
EST. AVERAGE GLUCOSE BLD GHB EST-MCNC: 114 MG/DL
GLOBULIN SER CALC-MCNC: 3.9 G/DL (ref 2–4)
GLUCOSE SERPL-MCNC: 120 MG/DL (ref 65–100)
HBA1C MFR BLD: 5.6 % (ref 4–5.6)
HCT VFR BLD AUTO: 41.7 % (ref 35–47)
HDLC SERPL-MCNC: 49 MG/DL
HDLC SERPL: 2.8 (ref 0–5)
HGB BLD-MCNC: 12.4 G/DL (ref 11.5–16)
LDLC SERPL CALC-MCNC: 78.8 MG/DL (ref 0–100)
MCH RBC QN AUTO: 29 PG (ref 26–34)
MCHC RBC AUTO-ENTMCNC: 29.7 G/DL (ref 30–36.5)
MCV RBC AUTO: 97.7 FL (ref 80–99)
NRBC # BLD: 0 K/UL (ref 0–0.01)
NRBC BLD-RTO: 0 PER 100 WBC
PLATELET # BLD AUTO: 195 K/UL (ref 150–400)
PMV BLD AUTO: 11.8 FL (ref 8.9–12.9)
POTASSIUM SERPL-SCNC: 4.3 MMOL/L (ref 3.5–5.1)
PROT SERPL-MCNC: 7.6 G/DL (ref 6.4–8.2)
RBC # BLD AUTO: 4.27 M/UL (ref 3.8–5.2)
SODIUM SERPL-SCNC: 140 MMOL/L (ref 136–145)
TRIGL SERPL-MCNC: 51 MG/DL
VLDLC SERPL CALC-MCNC: 10.2 MG/DL
WBC # BLD AUTO: 7.8 K/UL (ref 3.6–11)

## 2024-06-03 ENCOUNTER — HOME HEALTH ADMISSION (OUTPATIENT)
Dept: HOME HEALTH SERVICES | Facility: HOME HEALTH | Age: 64
End: 2024-06-03
Payer: MEDICARE

## 2024-06-03 ENCOUNTER — HOME CARE VISIT (OUTPATIENT)
Dept: HOME HEALTH SERVICES | Facility: HOME HEALTH | Age: 64
End: 2024-06-03

## 2024-06-03 LAB — TSH SERPL DL<=0.05 MIU/L-ACNC: 3.93 UIU/ML (ref 0.36–3.74)

## 2024-06-05 ENCOUNTER — HOME CARE VISIT (OUTPATIENT)
Facility: HOME HEALTH | Age: 64
End: 2024-06-05
Payer: MEDICARE

## 2024-06-05 VITALS
DIASTOLIC BLOOD PRESSURE: 84 MMHG | SYSTOLIC BLOOD PRESSURE: 178 MMHG | TEMPERATURE: 97.7 F | HEART RATE: 75 BPM | RESPIRATION RATE: 15 BRPM | OXYGEN SATURATION: 97 %

## 2024-06-05 PROCEDURE — G0299 HHS/HOSPICE OF RN EA 15 MIN: HCPCS

## 2024-06-05 PROCEDURE — G0151 HHCP-SERV OF PT,EA 15 MIN: HCPCS

## 2024-06-05 ASSESSMENT — ENCOUNTER SYMPTOMS
PAIN LOCATION - PAIN QUALITY: SHARP, STABBING
PAIN LOCATION - PAIN QUALITY: ACHY
HEMOPTYSIS: 0
DYSPNEA ACTIVITY LEVEL: WHILE SPEAKING

## 2024-06-06 ENCOUNTER — HOME CARE VISIT (OUTPATIENT)
Facility: HOME HEALTH | Age: 64
End: 2024-06-06
Payer: MEDICARE

## 2024-06-06 VITALS
OXYGEN SATURATION: 96 % | TEMPERATURE: 97.8 F | HEART RATE: 70 BPM | SYSTOLIC BLOOD PRESSURE: 124 MMHG | DIASTOLIC BLOOD PRESSURE: 70 MMHG

## 2024-06-06 PROCEDURE — G0152 HHCP-SERV OF OT,EA 15 MIN: HCPCS

## 2024-06-06 PROCEDURE — G0299 HHS/HOSPICE OF RN EA 15 MIN: HCPCS

## 2024-06-06 ASSESSMENT — ENCOUNTER SYMPTOMS
SKIN LESIONS: 1
DYSPNEA ACTIVITY LEVEL: AT REST

## 2024-06-06 NOTE — HOME HEALTH
Reason for referral, PMH SUMMARY of clinical condition:    Patient is a 65yo female referred to  services following PCP visit 5.31.24 with patient homebound for several years related to medical issues including morbid obesity.  PMH significant for HTN, HLD, PSVT, hypothyroidism, respiratory failure with hypoxia, sleep apnea, LLE lymphedema.  Patient received oxygen concentrator in home 6.5.24 and noted to have oxygen saturation of 88-90% without use of oxygen, up to 96% with oxygen in use at 2l.  Patient lives with  in single story home with ramp in place, homebound x 3 years due to morbid obesity and inability to ambulate or utilize WC to exit home, requires mod-max A for basic self care needs and dependent for all IADLs, essentially unable to exit bedroom.  Reports no falls in last 6 months but generally only able to ambulate a few feet using dresser and bed for support.  Household clutter with narrow pathways especially around foot of bed in bedroom.  Patient/ also report their only dtr was murdered in January 2024 which has been a hardship for them.    Clinical Assessment/Skilled reason for admission to home health (What this means for the patient overall and need for ongoing skilled care):  Patient presents sitting EOB, alert, pleasant with no apparent SOB at rest but oxygen saturation 88-90% without use of oxygen this visit.  Patient has BLE weakness and has moderate limitations in BUE shoulder motion.  DME available:  manual WC (from friend), bariatric BSC, oxygen concentrator, CPAP - does not use, ramp to home.  Patient requiring assist for all self care and transfer needs with patient at high risk for falls with injury and limited availability of family who can physically assist patient if she were to fall.  Patient has referral to Dr. Ventura for pulmonary assessment and Rx for chest xray but patient has limited ability to exit home for any appointment or community access at this time.

## 2024-06-06 NOTE — HOME HEALTH
concerns as follows: none, clarifications on: none    Interdisciplinary communication with: none    PCP:  Bharath Mar MD    Next scheduled doctor appointment: TBD, Patient/Caregiver instructed to keep follow up appointment because lack of follow through with physician appointments could result in discontinuation of home care services for non-compliance. Patient/Caregiver verbalize knowledge of above through teach back with 100 percent accuracy.  Emergency Preparedness: Patient/Caregiver instructed in the following:  Have one gallon of water per person for at least 3 days on hand.  Have non-perishable food for at least 3 days that do not need to be cooked.  Have flashlights and batteries.  Charge your cell phones and any back up lithium batteries for your cell phones.  Have 3+ days of back up oxygen in your home.  Have a phone in your home that is hard wired and does not require power.  Have medication for a week in your home.  Make sure you have a caregiver in the home to provide care in case your home health nurse cannot get to your house.  Make sure you have all of your paperwork i.e. written emergency preparedness plan, Identification, insurance cards, DME phone number, physician and pharmacy phone number, agency phone number, and your medications in one place for easy access and in a zip lock bag to protect them.  Take your Admission Handbook, written emergency preparedness plan, written medication list and folder if you relocate in the event of an emergency, if possible.   Call agency if you relocate so we can contact you.    Patient/Caregiver verbalize knowledge of above through teach back with 100% percent accuracy.

## 2024-06-07 NOTE — HOME HEALTH
Reason for referral, PMH SUMMARY of clinical condition: 64-year-old female patient of Dr. Bharath Mar admitted to home health for acute respiratory failure. PMH of hypertension, HLD, GERD, PSVT, HYPOTHYROIDISM, ANXIETY, DEPRESSION, STRESS, MORBID OBESITY, LYMPHEDEMA, OBSTRUTIVE SLEEP APNEA. Lungs are clear but reports shortness of breath at rest, Lymphedema noted to BLE. Patient lives with  in one level home who is only caretaker.  reports he is overwhelmed and his own health issues and he is having difficulty caring for his wife. Home is extremely unkept with cluttered walkways. Patient reports having a order for a [employer] electric wheelchair that will allow patient more mobility. Currently she has only left the home approximately 3 times in the past 4 years. Patient unabe to ambulate but can complete pivot from bed to BSC with help from . Has a manual wheelchair. Patient max assistance with bathing and dressing. High risk related to weakness and ability. Patient alert and oriented x4. Caregivers require training to assist patient. Patient requires skilled nursing for medication management, cardiopulmonary assessment nd medication management to prevent hospitalization.     Functional Mobility:  Bed Mobility (rolling/scooting): Maximum Assistance (requires assistance with more than 50% of the effort)  Transfers (supine to chair and return to supine): Maximum Assistance (requires assistance with more than 50% of the effort).  Patient uses device: yes  Gait:  Ambulates with maximum assistance using a wheelchair  Safety concerns with mobility include: unsteady gait, impaired balance , recent falls, fatigues easily , debility, pain interferes with activity, poor safety awareness, SOB with minimal exertion, caregiver demonstrates poor safety and requires further training and cluttered living area, environmental modifications needed for safety   DME: wheelchair and 3:1 commode    Diagnosis:

## 2024-06-07 NOTE — HOME HEALTH
Subjective: Pt states she does not see anyone for lymphedema due to not being able to get out of the house. Pt is currently seeing a psychiatrist to deal with her daughters death.  Falls since last visit No(if yes complete the Fall Tracking Form and include bsrifallreport):   Caregiver involvement changes: na  Home health supplies by type and quantity ordered/delivered this visit include: na    Clinician asked if patient has had any physician contact since last home care visit and patient states: NO  Clinician asked if patient has any new or changed medications and patient states:  N/A   If Yes, were medications reconciled? N/A   Was the certifying physician notified of changes in medications? N/A     Clinical assessment (what this visit means for the patient overall and need for ongoing skilled care) and progress or lack of progress towards SPECIFIC goals: Continued SN needed for anxiety and grief mgt., no goals met this visit. Pt lost her daughter Jan 2024, death was a homicide. Pt states she does not leave the home due to difficulty ambulating. Pt skin dry, sn educated pt on staying moisturized to prevent injury to the skin. Pt and CG verbalized understanding with 100% accuracy.     Written Teaching Material Utilized: verbal    Interdisciplinary communication with: N/A     Discharge planning as follows: When goals are met    Specific plan for next visit: anxiety

## 2024-06-08 VITALS
OXYGEN SATURATION: 93 % | RESPIRATION RATE: 16 BRPM | HEART RATE: 75 BPM | DIASTOLIC BLOOD PRESSURE: 80 MMHG | SYSTOLIC BLOOD PRESSURE: 138 MMHG | TEMPERATURE: 97.7 F | HEIGHT: 69 IN

## 2024-06-08 ASSESSMENT — ENCOUNTER SYMPTOMS: BOWEL INCONTINENCE: 1

## 2024-06-09 ENCOUNTER — HOME CARE VISIT (OUTPATIENT)
Facility: HOME HEALTH | Age: 64
End: 2024-06-09
Payer: MEDICARE

## 2024-06-09 VITALS
HEART RATE: 88 BPM | OXYGEN SATURATION: 88 % | WEIGHT: 293 LBS | BODY MASS INDEX: 43.4 KG/M2 | HEIGHT: 69 IN | DIASTOLIC BLOOD PRESSURE: 76 MMHG | TEMPERATURE: 97.7 F | RESPIRATION RATE: 18 BRPM | SYSTOLIC BLOOD PRESSURE: 112 MMHG

## 2024-06-09 PROCEDURE — G0151 HHCP-SERV OF PT,EA 15 MIN: HCPCS

## 2024-06-10 ENCOUNTER — HOME CARE VISIT (OUTPATIENT)
Facility: HOME HEALTH | Age: 64
End: 2024-06-10
Payer: MEDICARE

## 2024-06-10 VITALS
DIASTOLIC BLOOD PRESSURE: 80 MMHG | OXYGEN SATURATION: 91 % | TEMPERATURE: 97.8 F | SYSTOLIC BLOOD PRESSURE: 124 MMHG | HEART RATE: 79 BPM

## 2024-06-10 VITALS
RESPIRATION RATE: 18 BRPM | HEART RATE: 70 BPM | TEMPERATURE: 97.8 F | SYSTOLIC BLOOD PRESSURE: 124 MMHG | OXYGEN SATURATION: 90 % | DIASTOLIC BLOOD PRESSURE: 70 MMHG

## 2024-06-10 PROCEDURE — G0152 HHCP-SERV OF OT,EA 15 MIN: HCPCS

## 2024-06-10 NOTE — HOME HEALTH
Subjective:   \"I'm doing good.  I have a bit of a headache.\"  Falls since last visit :  no  Caregiver involvement changes:   mega  Home health supplies by type and quantity ordered/delivered this visit include:   mega    Clinician asked if patient has had any physician contact since last home care visit and patient states: NO  Clinician asked if patient has any new or changed medications and patient states:  NO   If Yes, were medications reconciled? N/A   Was the certifying physician notified of changes in medications? N/A     Clinical assessment (what this visit means for the patient overall and need for ongoing skilled care) and progress or lack of progress towards SPECIFIC goals:   Patient presents cooperative and willing to perform transfer training and LB dressing education using AE this visit.  She continues to voice being receptive to placement of hospital bed which would allow her more space to manage transfers and potential use of AD including walker and WC if placed in living room.  Patient also performed PT HEP while seated at BSC during rest break between transfer tasks this visit.    Written Teaching Material Utilized:   mega  Interdisciplinary communication with:   mega  Discharge planning as follows:   continue 2x week toward stated goals  Specific plan for next visit:   transfer training

## 2024-06-11 ENCOUNTER — HOME CARE VISIT (OUTPATIENT)
Facility: HOME HEALTH | Age: 64
End: 2024-06-11
Payer: MEDICARE

## 2024-06-11 VITALS
RESPIRATION RATE: 16 BRPM | SYSTOLIC BLOOD PRESSURE: 168 MMHG | TEMPERATURE: 97.6 F | OXYGEN SATURATION: 93 % | DIASTOLIC BLOOD PRESSURE: 64 MMHG | HEART RATE: 78 BPM

## 2024-06-11 PROCEDURE — G0300 HHS/HOSPICE OF LPN EA 15 MIN: HCPCS

## 2024-06-11 ASSESSMENT — ENCOUNTER SYMPTOMS
DYSPNEA ACTIVITY LEVEL: AFTER AMBULATING LESS THAN 10 FT
PAIN LOCATION - PAIN QUALITY: HEADACHE

## 2024-06-11 NOTE — HOME HEALTH
Subjective: \"I'm having a headache and some neck pain.\"  Falls since last visit No(if yes complete the Fall Tracking Form and include bsrifallreport):   Caregiver involvement changes: No  Home health supplies by type and quantity ordered/delivered this visit include: n/a    Clinician asked if patient has had any physician contact since last home care visit and patient states: NO  Clinician asked if patient has any new or changed medications and patient states:  NO   If Yes, were medications reconciled? N/A   Was the certifying physician notified of changes in medications? N/A     Clinical assessment (what this visit means for the patient overall and need for ongoing skilled care) and progress or lack of progress towards SPECIFIC goals: Pt at risk for rehospitalization r/t fall risk, dyspnea, hypertensive distress.    Written Teaching Material Utilized: N/A    Interdisciplinary communication with: Dr. Bharath Mar MD Physician via In Basket for the purpose of notification of pt's elevated blood pressure this visit    Discharge planning as follows: Will discharge when the patient has reached their maximum functional potential and maximum safety in their home    Specific plan for next visit: Assessment, follow-up on hypertension, education as needed

## 2024-06-12 ENCOUNTER — HOME CARE VISIT (OUTPATIENT)
Dept: HOME HEALTH SERVICES | Facility: HOME HEALTH | Age: 64
End: 2024-06-12
Payer: MEDICARE

## 2024-06-13 ENCOUNTER — HOME CARE VISIT (OUTPATIENT)
Facility: HOME HEALTH | Age: 64
End: 2024-06-13
Payer: MEDICARE

## 2024-06-13 VITALS
SYSTOLIC BLOOD PRESSURE: 150 MMHG | OXYGEN SATURATION: 91 % | TEMPERATURE: 98 F | DIASTOLIC BLOOD PRESSURE: 88 MMHG | HEART RATE: 77 BPM

## 2024-06-13 VITALS — SYSTOLIC BLOOD PRESSURE: 140 MMHG | DIASTOLIC BLOOD PRESSURE: 88 MMHG | OXYGEN SATURATION: 98 % | HEART RATE: 75 BPM

## 2024-06-13 PROCEDURE — G0299 HHS/HOSPICE OF RN EA 15 MIN: HCPCS

## 2024-06-13 PROCEDURE — G0157 HHC PT ASSISTANT EA 15: HCPCS

## 2024-06-13 PROCEDURE — G0152 HHCP-SERV OF OT,EA 15 MIN: HCPCS

## 2024-06-13 PROCEDURE — G0156 HHCP-SVS OF AIDE,EA 15 MIN: HCPCS

## 2024-06-13 ASSESSMENT — ENCOUNTER SYMPTOMS: DYSPNEA ACTIVITY LEVEL: AT REST

## 2024-06-13 NOTE — HOME HEALTH
Subjective:   \"PT said my blood pressure was up.\"  Falls since last visit :  no  Caregiver involvement changes: mega  Home health supplies by type and quantity ordered/delivered this visit include:   mega    Clinician asked if patient has had any physician contact since last home care visit and patient states: NO  Clinician asked if patient has any new or changed medications and patient states:  NO   If Yes, were medications reconciled? N/A   Was the certifying physician notified of changes in medications? N/A     Clinical assessment (what this visit means for the patient overall and need for ongoing skilled care) and progress or lack of progress towards SPECIFIC goals:   Patient reports no pain today although PTA noted BP elevated.  BP reading WFL with OT assessment today.  Patient benefitting from use of supplemental oxygen with increased activity tolerance this visit with continuous of oxygen at 2l.  Patient to continue to benefit from skilled OT intervention to advance functional mobility in order to fascilitate increased ADL independence.    Written Teaching Material Utilized:  mega    Interdisciplinary communication with:   mega  Discharge planning as follows:  continue 2x week  Specific plan for next visit:   ambulation trial with RW for side stepping

## 2024-06-14 ENCOUNTER — HOME CARE VISIT (OUTPATIENT)
Facility: HOME HEALTH | Age: 64
End: 2024-06-14
Payer: MEDICARE

## 2024-06-14 ENCOUNTER — HOME CARE VISIT (OUTPATIENT)
Dept: HOME HEALTH SERVICES | Facility: HOME HEALTH | Age: 64
End: 2024-06-14
Payer: MEDICARE

## 2024-06-14 VITALS
DIASTOLIC BLOOD PRESSURE: 82 MMHG | TEMPERATURE: 98.2 F | SYSTOLIC BLOOD PRESSURE: 160 MMHG | RESPIRATION RATE: 18 BRPM | HEART RATE: 84 BPM | OXYGEN SATURATION: 95 %

## 2024-06-14 PROCEDURE — G0155 HHCP-SVS OF CSW,EA 15 MIN: HCPCS

## 2024-06-14 NOTE — HOME HEALTH
JOHANNA STRICKLANDSW conducted the initial SW eval with pt and spouse in the home.  Pt is a 64 year old female with a HH DX of Respiratory failure with hypoxia.  Pt lives in a 1 story home with her spouse of 40 years.  Home has steps at entrance; however, there is a metal ramp installed for accessibility. Home is very cluttered; however, spouse has begun working on taking out trash items to assist with clearing space in the living room area for a hospital bed and modernized WC. On arrival pt was laying in bed, alert, oriented, welcoming of visit, and pleasant in demeanor.  Pt is 400+ pounds with limited mobility, spouse has own health concerns and limitations. Pt shared that they lost their 33 year old daughter in January in a traumatic murder.  Pt and spouse depended on dtr in many ways for assistance with care and her loss has significantly impacted them.  Pt is being treated by a psychiatrist for depression, anxiety, and sleep depravation. LMSW provided emotional support and cycle of grief education. Pt reports having a very supportive family and friends whom MSW encouraged them to utilize in this time of need. LMSW provided education on the role of SW with the HH team and assessed for various areas of need.  Pt expressed concerns with transportation needed for doctor's appointments as spouse is unable to safely push pt down the accessibility ramp at front door.  Pt is able to get herself into her manual WC and to the front door only.  LMSW provided education on transportation resources and initiated calls to [facility] and [facility].  Pt declined assistance with completing the online application for CARE and was comfortable to complete independently. PT will contact LMSW if any questions arise. Next Medical appointment is scheduled for July 2nd. Assessed financials and pt/spouse do not meet the financial criteria for Medicaid.  Pt receives disability & SS and spouse received state pension and SS.  They both have LCT

## 2024-06-14 NOTE — HOME HEALTH
Subjective: Pt states her bp has been elevated and she isnt sure why, sn asked pt what she ate today and pt said szymanski. Pt states szymanski would not cause her bp to elevate. Educated pt on fried foods, pork, foods with sodium will cause elevated bp  Falls since last visit No(if yes complete the Fall Tracking Form and include bsrifallreport):   Caregiver involvement changes: na  Home health supplies by type and quantity ordered/delivered this visit include: na    Clinician asked if patient has had any physician contact since last home care visit and patient states: NO  Clinician asked if patient has any new or changed medications and patient states:  N/A   If Yes, were medications reconciled? N/A   Was the certifying physician notified of changes in medications? N/A     Clinical assessment (what this visit means for the patient overall and need for ongoing skilled care) and progress or lack of progress towards SPECIFIC goals: Continued SN needed for general education and assessment on causes of HTN. Pt did not meet any goals this visit. Partially repeated back s/s of stroke.     Written Teaching Material Utilized: verbal    Interdisciplinary communication with: N/A  specific plan for next visit: Educate on htn disease process

## 2024-06-14 NOTE — HOME HEALTH
Subjective: Pt reported increased functional act skyler since eval.  Falls since last visit No(if yes complete the Fall Tracking Form and include bsrifallreport):   Caregiver involvement changes: none.  Home health supplies by type and quantity ordered/delivered this visit include: none.    Clinician asked if patient has had any physician contact since last home care visit and patient states: NO  Clinician asked if patient has any new or changed medications and patient states:  NO   If Yes, were medications reconciled? N/A   Was the certifying physician notified of changes in medications? N/A     Clinical assessment (what this visit means for the patient overall and need for ongoing skilled care) and progress or lack of progress towards SPECIFIC goals: Todays visit allowed for pt to receive strength and transfer training to decrease fall risk. gait and balance goals have not been met.    Written Teaching Material Utilized: N/A    Interdisciplinary communication with:  PT for the purpose of POC collaboration    Discharge planning as follows: When goals are met    Specific plan for next visit: Instruct caregiver/patient in gait , balance and B LE strength training.

## 2024-06-17 ENCOUNTER — HOME CARE VISIT (OUTPATIENT)
Facility: HOME HEALTH | Age: 64
End: 2024-06-17
Payer: MEDICARE

## 2024-06-17 VITALS
HEART RATE: 82 BPM | DIASTOLIC BLOOD PRESSURE: 70 MMHG | SYSTOLIC BLOOD PRESSURE: 132 MMHG | TEMPERATURE: 97.5 F | OXYGEN SATURATION: 96 %

## 2024-06-17 PROCEDURE — G0152 HHCP-SERV OF OT,EA 15 MIN: HCPCS

## 2024-06-17 NOTE — HOME HEALTH
Subjective:   \"My left leg doesn't want to hold me sometimes.\"  Falls since last visit :  no  Caregiver involvement changes:   na  Home health supplies by type and quantity ordered/delivered this visit include:   na    Clinician asked if patient has had any physician contact since last home care visit and patient states: NO  Clinician asked if patient has any new or changed medications and patient states:  NO   If Yes, were medications reconciled? N/A   Was the certifying physician notified of changes in medications? N/A     Clinical assessment (what this visit means for the patient overall and need for ongoing skilled care) and progress or lack of progress towards SPECIFIC goals:   Patient presents without c/o pain although indicates LLE weakness with standing trial instruction despite use of RW for UE support. PCP to initiate request/order for hospital bed with patient limited in advancing safe mobility due to limited space in bedroom.  , family attempting to declutter home with  having limited ability to manage heavy tasks himself due to his own medical issues.  Plan to continue to follow 2x week toward stated goals.    Written Teaching Material Utilized:   mega    Interdisciplinary communication with:   received case communication from Dr. Mar regarding initiating request for hospital bed through his office  Discharge planning as follows:  continue 2x week, making slow gains toward goals, household clutter limits functional space to promote higher level mobility at this time  Specific plan for next visit:   standing trials, standing with unilateral UE support

## 2024-06-18 ENCOUNTER — HOME CARE VISIT (OUTPATIENT)
Facility: HOME HEALTH | Age: 64
End: 2024-06-18
Payer: MEDICARE

## 2024-06-20 ENCOUNTER — HOME CARE VISIT (OUTPATIENT)
Facility: HOME HEALTH | Age: 64
End: 2024-06-20
Payer: MEDICARE

## 2024-06-20 VITALS
RESPIRATION RATE: 20 BRPM | DIASTOLIC BLOOD PRESSURE: 90 MMHG | HEART RATE: 80 BPM | SYSTOLIC BLOOD PRESSURE: 130 MMHG | TEMPERATURE: 98.1 F | OXYGEN SATURATION: 93 %

## 2024-06-20 PROCEDURE — G0300 HHS/HOSPICE OF LPN EA 15 MIN: HCPCS

## 2024-06-20 PROCEDURE — G0151 HHCP-SERV OF PT,EA 15 MIN: HCPCS

## 2024-06-20 ASSESSMENT — ENCOUNTER SYMPTOMS: PAIN LOCATION - PAIN QUALITY: SORE

## 2024-06-20 NOTE — HOME HEALTH
Subjective: Patient states she has been having a hard time getting out of the house  Falls since last visit No(if yes complete the Fall Tracking Form and include bsrifallreport):   Caregiver involvement changes: None  Home health supplies by type and quantity ordered/delivered this visit include: None    Clinician asked if patient has had any physician contact since last home care visit and patient states: NO  Clinician asked if patient has any new or changed medications and patient states:  NO   If Yes, were medications reconciled? N/A   Was the certifying physician notified of changes in medications? N/A     Clinical assessment (what this visit means for the patient overall and need for ongoing skilled care) and progress or lack of progress towards SPECIFIC goals: Patient is performing HEP independently. Goal met. Patient is performing transfers with SBA. Goal met    Written Teaching Material Utilized: HEP    Interdisciplinary communication with: Catina for the purpose of POC collaboration    Discharge planning as follows: When goals are met    Specific plan for next visit: strength and gait

## 2024-06-21 ENCOUNTER — HOME CARE VISIT (OUTPATIENT)
Facility: HOME HEALTH | Age: 64
End: 2024-06-21
Payer: MEDICARE

## 2024-06-21 VITALS
SYSTOLIC BLOOD PRESSURE: 134 MMHG | HEART RATE: 78 BPM | OXYGEN SATURATION: 93 % | TEMPERATURE: 97.6 F | DIASTOLIC BLOOD PRESSURE: 80 MMHG

## 2024-06-21 PROCEDURE — G0152 HHCP-SERV OF OT,EA 15 MIN: HCPCS

## 2024-06-21 NOTE — HOME HEALTH
Subjective:   \"I'm going to hold on the bed delivery for a bit.\"  Falls since last visit :   no  Caregiver involvement changes:   na  Home health supplies by type and quantity ordered/delivered this visit include:   na    Clinician asked if patient has had any physician contact since last home care visit and patient states: NO  Clinician asked if patient has any new or changed medications and patient states:  NO   If Yes, were medications reconciled? N/A   Was the certifying physician notified of changes in medications? N/A     Clinical assessment (what this visit means for the patient overall and need for ongoing skilled care) and progress or lack of progress towards SPECIFIC goals:    Patient presents without c/o pain although indicated LLE feeling weaker and \"shaking\" during standing tasks this visit.  Hospital bed order initiated by PCP office through [employer] with patient electing to hold on delivery of bed pending making room in living room and she has some out of pocket expenses related to bed cost.  Patient participated fully in standing trials using RW for support this visit with max standing time of 1 minute on multiple trials due to LLE weakness.  Patient to continue to benefit from skilled OT intervention toward stated goals.    Written Teaching Material Utilized:   mega    Interdisciplinary communication with:  case communication to Manfred Dowell regarding visit coverage while this OTL on PTO    Discharge planning as follows:  continue 2w2    Specific plan for next visit:   standing trials, transfers to/from [facility]

## 2024-06-24 ENCOUNTER — HOME CARE VISIT (OUTPATIENT)
Facility: HOME HEALTH | Age: 64
End: 2024-06-24
Payer: MEDICARE

## 2024-06-24 ENCOUNTER — HOME CARE VISIT (OUTPATIENT)
Dept: HOME HEALTH SERVICES | Facility: HOME HEALTH | Age: 64
End: 2024-06-24
Payer: MEDICARE

## 2024-06-25 ENCOUNTER — HOME CARE VISIT (OUTPATIENT)
Facility: HOME HEALTH | Age: 64
End: 2024-06-25
Payer: MEDICARE

## 2024-06-25 NOTE — HOME HEALTH
Subjective: deniespain has red area under breast  Falls since last visit No(if yes complete the Fall Tracking Form and include bsrifallreport):   Caregiver involvement changes: no  Home health supplies by type and quantity ordered/delivered this visit include: na    Clinician asked if patient has had any physician contact since last home care visit and patient states: NO  Clinician asked if patient has any new or changed medications and patient states:  NO   If Yes, were medications reconciled? N/A   Was the certifying physician notified of changes in medications? N/A     Clinical assessment (what this visit means for the patient overall and need for ongoing skilled care) and progress or lack of progress towards SPECIFIC goals: has red area under breast educated on keeping dry has powder to apply educated on falland safety precautions to remove clutter    Written Teaching Material Utilized:verbal    Interdisciplinary communication with: md to report red areas under breast requested to apply zinc paste for fungus and rash    Discharge planning as follows: When goals are met    Specific plan for next visit: assessment

## 2024-06-25 NOTE — HOME HEALTH
Insurance has denied additional MSW visits for pt.  LMSW spoke with pt to inform her of this information and will provide resources to assist with furniture removal via phone/text.  Visit scheduled for today will be missed. Dr. Mar informed of Missed Visit via in basket.

## 2024-06-27 ENCOUNTER — HOME CARE VISIT (OUTPATIENT)
Facility: HOME HEALTH | Age: 64
End: 2024-06-27
Payer: MEDICARE

## 2024-06-28 RX ORDER — FUROSEMIDE 20 MG/1
TABLET ORAL
Qty: 90 TABLET | Refills: 1 | Status: SHIPPED | OUTPATIENT
Start: 2024-06-28

## 2024-06-28 RX ORDER — VERAPAMIL HYDROCHLORIDE 240 MG/1
TABLET, FILM COATED, EXTENDED RELEASE ORAL
Qty: 90 TABLET | Refills: 2 | Status: SHIPPED | OUTPATIENT
Start: 2024-06-28

## 2024-07-01 ENCOUNTER — HOME CARE VISIT (OUTPATIENT)
Facility: HOME HEALTH | Age: 64
End: 2024-07-01
Payer: MEDICARE

## 2024-07-01 PROCEDURE — G0156 HHCP-SVS OF AIDE,EA 15 MIN: HCPCS

## 2024-07-02 ENCOUNTER — HOME CARE VISIT (OUTPATIENT)
Facility: HOME HEALTH | Age: 64
End: 2024-07-02
Payer: MEDICARE

## 2024-07-02 VITALS
DIASTOLIC BLOOD PRESSURE: 70 MMHG | OXYGEN SATURATION: 94 % | RESPIRATION RATE: 18 BRPM | TEMPERATURE: 97.3 F | HEART RATE: 83 BPM | SYSTOLIC BLOOD PRESSURE: 130 MMHG

## 2024-07-02 PROCEDURE — G0299 HHS/HOSPICE OF RN EA 15 MIN: HCPCS

## 2024-07-02 PROCEDURE — G0151 HHCP-SERV OF PT,EA 15 MIN: HCPCS

## 2024-07-02 ASSESSMENT — ENCOUNTER SYMPTOMS: DYSPNEA ACTIVITY LEVEL: WHILE EATING

## 2024-07-02 NOTE — HOME HEALTH
Subjective: Patient states she is feeling better this week. Had a stomach bug last week  Falls since last visit No(if yes complete the Fall Tracking Form and include bsrifallreport):   Caregiver involvement changes: None    Clinician asked if patient has had any physician contact since last home care visit and patient states: NO  Clinician asked if patient has any new or changed medications and patient states:  NO   If Yes, were medications reconciled? N/A   Was the certifying physician notified of changes in medications? N/A     A list of reconciled medications has been given to the patient/caregiver  and uploaded to media.      Clinical assessment (what this visit means for the patient overall and need for ongoing skilled care) and progress or lack of progress towards SPECIFIC goals: Patient received skilled SN, PT, OT, and HHA services to address complications r/t respiratory failure and impaired mobility. She missed multiple visits d/t scheduling and feeling ill. She was educated on medication management, exercises, and transfer training bed <> commode. She has met a functional baseline at this time. She will receive a hospital bed once she is able to clear out the front room. Discussed participation in therapy at that time again to educate on safety in the home. She is appropriate for discharge from home health at this time. PHQ-9 score was 5. discussed with patient the importance of talking with therapist and MD about continued depression.    Discharge Instructions:    Written Teaching Material Utilized: discharge instructions    Instructed patient/caregiver on the following: Take all medications exactly as prescribed by physician. Updated medication list present in the home at discharge, Keep all scheduled medical appointments, Wash hands frequently to control the spread of infection, Follow safety and fall prevention education to prevent falls and Continue home exercises as instructed by your

## 2024-07-03 VITALS
RESPIRATION RATE: 18 BRPM | SYSTOLIC BLOOD PRESSURE: 130 MMHG | OXYGEN SATURATION: 94 % | HEART RATE: 83 BPM | TEMPERATURE: 97.3 F | DIASTOLIC BLOOD PRESSURE: 70 MMHG

## 2024-07-03 NOTE — HOME HEALTH
Subjective: Pt states her bp has been good and she feels good.   Falls since last visit No(if yes complete the Fall Tracking Form and include bsrifallreport):   Caregiver involvement changes: na    Clinician asked if patient has had any physician contact since last home care visit and patient states: NO  Clinician asked if patient has any new or changed medications and patient states:  N/A   If Yes, were medications reconciled? N/A   Was the certifying physician notified of changes in medications? N/A     A list of reconciled medications has been given to the patient/caregiver .      Clinical assessment (what this visit means for the patient overall and need for ongoing skilled care) and progress or lack of progress towards SPECIFIC goals: Pt admitted to home health for grief, anxiety, HTN education and assessment. Pt has met careplan goals and states she is ready for discharge today.     Discharge Instructions:    Written Teaching Material Utilized: verbal    Instructed patient/caregiver on the following: Take all medications exactly as prescribed by physician. Updated medication list present in the home at discharge, Keep all scheduled medical appointments, Wash hands frequently to control the spread of infection, Follow safety and fall prevention education to prevent falls and Continue home exercises as instructed by your therapist    Call physician for: continuous pain, abnormal bleeding, high fever over 100.4 degrees, increased pain, new problem and frequent falls    The patient/caregiver expressed knowledge and understanding of Discharge Instructions      Interdisciplinary communication with: N/A

## 2024-07-15 ENCOUNTER — TELEPHONE (OUTPATIENT)
Age: 64
End: 2024-07-15

## 2024-07-15 RX ORDER — CIPROFLOXACIN 500 MG/1
500 TABLET, FILM COATED ORAL 2 TIMES DAILY
Qty: 6 TABLET | Refills: 0 | Status: SHIPPED | OUTPATIENT
Start: 2024-07-15 | End: 2024-07-18

## 2024-07-15 NOTE — TELEPHONE ENCOUNTER
Attempted to reach patient. Left message on vm to return call    Per PCP - Rx sent to pharmacy for UTI symptoms.

## 2024-07-17 NOTE — TELEPHONE ENCOUNTER
Spoke with patient.     Pt states dispatch sammy prescribed antibiotics 2 tablets for 7 days. Finished.     Pt did not  cipro from pharmacy. Is patient to start new antibiotic as well?     Pt states she still have urine odor. No other symptoms.     Please advise.

## 2024-07-18 NOTE — TELEPHONE ENCOUNTER
Spoke with patient. Advised. Verbalized understanding.     Per Dr. Mar    If dispatch treated her uti with 2 abx she should not need cipro

## 2024-08-23 ENCOUNTER — TELEPHONE (OUTPATIENT)
Age: 64
End: 2024-08-23

## 2024-08-23 RX ORDER — CIPROFLOXACIN 250 MG/1
250 TABLET, FILM COATED ORAL 2 TIMES DAILY
Qty: 6 TABLET | Refills: 0 | Status: SHIPPED | OUTPATIENT
Start: 2024-08-23 | End: 2024-08-26

## 2024-09-09 ENCOUNTER — TELEPHONE (OUTPATIENT)
Age: 64
End: 2024-09-09

## 2024-12-06 RX ORDER — FUROSEMIDE 20 MG/1
TABLET ORAL
Qty: 90 TABLET | Refills: 1 | Status: SHIPPED | OUTPATIENT
Start: 2024-12-06

## 2025-01-20 RX ORDER — VERAPAMIL HYDROCHLORIDE 240 MG/1
TABLET, FILM COATED, EXTENDED RELEASE ORAL
Qty: 90 TABLET | Refills: 2 | Status: SHIPPED | OUTPATIENT
Start: 2025-01-20

## 2025-02-24 RX ORDER — LOSARTAN POTASSIUM 50 MG/1
50 TABLET ORAL DAILY
Qty: 90 TABLET | Refills: 3 | OUTPATIENT
Start: 2025-02-24

## 2025-02-24 RX ORDER — LOSARTAN POTASSIUM 50 MG/1
50 TABLET ORAL DAILY
Qty: 90 TABLET | Refills: 3 | Status: SHIPPED | OUTPATIENT
Start: 2025-02-24

## 2025-02-28 RX ORDER — ROSUVASTATIN CALCIUM 5 MG/1
TABLET, COATED ORAL
Qty: 90 TABLET | Refills: 3 | Status: SHIPPED | OUTPATIENT
Start: 2025-02-28

## 2025-03-24 RX ORDER — CYCLOBENZAPRINE HCL 10 MG
10 TABLET ORAL 3 TIMES DAILY PRN
Qty: 30 TABLET | Refills: 1 | Status: SHIPPED | OUTPATIENT
Start: 2025-03-24

## 2025-05-27 RX ORDER — SOLIFENACIN SUCCINATE 10 MG/1
10 TABLET, FILM COATED ORAL DAILY
Qty: 90 TABLET | Refills: 4 | Status: SHIPPED | OUTPATIENT
Start: 2025-05-27

## 2025-06-10 RX ORDER — FUROSEMIDE 20 MG/1
20 TABLET ORAL DAILY
Qty: 90 TABLET | Refills: 1 | Status: SHIPPED | OUTPATIENT
Start: 2025-06-10

## 2025-08-05 ENCOUNTER — TELEPHONE (OUTPATIENT)
Age: 65
End: 2025-08-05

## 2025-08-05 DIAGNOSIS — M25.579 ANKLE PAIN, UNSPECIFIED CHRONICITY, UNSPECIFIED LATERALITY: Primary | ICD-10-CM

## 2025-08-05 RX ORDER — TRAMADOL HYDROCHLORIDE 50 MG/1
50 TABLET ORAL EVERY 8 HOURS PRN
Qty: 21 TABLET | Refills: 0 | Status: SHIPPED | OUTPATIENT
Start: 2025-08-05 | End: 2025-08-12

## (undated) DEVICE — TOWEL SURG W17XL27IN STD BLU COT NONFENESTRATED PREWASHED

## (undated) DEVICE — STERILE POLYISOPRENE POWDER-FREE SURGICAL GLOVES: Brand: PROTEXIS

## (undated) DEVICE — INTENDED FOR TISSUE SEPARATION, AND OTHER PROCEDURES THAT REQUIRE A SHARP SURGICAL BLADE TO PUNCTURE OR CUT.: Brand: BARD-PARKER ® CARBON RIB-BACK BLADES

## (undated) DEVICE — SUT ETHLN 3-0 18IN PS2 BLK --

## (undated) DEVICE — KENDALL DL ECG CABLE AND LEAD WIRE SYSTEM, 3-LEAD, SINGLE PATIENT USE: Brand: KENDALL

## (undated) DEVICE — HAND I-LF: Brand: MEDLINE INDUSTRIES, INC.

## (undated) DEVICE — DRESSING,GAUZE,XEROFORM,CURAD,1"X8",ST: Brand: CURAD

## (undated) DEVICE — ZIMMER® STERILE DISPOSABLE TOURNIQUET CUFF WITH PLC, DUAL PORT, SINGLE BLADDER, 18 IN. (46 CM)

## (undated) DEVICE — INFECTION CONTROL KIT SYS

## (undated) DEVICE — SYR 10ML LUER LOK 1/5ML GRAD --

## (undated) DEVICE — SOLUTION LACTATED RINGERS INJECTION USP

## (undated) DEVICE — CONTINU-FLO SOLUTION SET, 2 INJECTION SITES, MALE LUER LOCK ADAPTER WITH RETRACTABLE COLLAR, LARGE BORE STOPCOCK WITH ROTATING MALE LUER LOCK EXTENSION SET, 2 INJECTION SITES, MALE LUER LOCK ADAPTER WITH RETRACTABLE COLLAR: Brand: INTERLINK/CONTINU-FLO

## (undated) DEVICE — PREP SKN CHLRAPRP APL 26ML STR --

## (undated) DEVICE — GOWN,SIRUS,NONRNF,SETINSLV,XL,20/CS: Brand: MEDLINE

## (undated) DEVICE — BNDG ELAS HK LOOP 2X5YD NS -- MATRIX

## (undated) DEVICE — 3M™ TEGADERM™ TRANSPARENT FILM DRESSING FRAME STYLE, 1624W, 2-3/8 IN X 2-3/4 IN (6 CM X 7 CM), 100/CT 4CT/CASE: Brand: 3M™ TEGADERM™

## (undated) DEVICE — NEEDLE HYPO 25GA L1.5IN BVL ORIENTED ECLIPSE

## (undated) DEVICE — SET 2ND L34IN N DEHP THE QUEENS MED CNTR VALUELINK

## (undated) DEVICE — STERILE POLYISOPRENE POWDER-FREE SURGICAL GLOVES WITH EMOLLIENT COATING: Brand: PROTEXIS

## (undated) DEVICE — BIPOLAR FORCEPS CORD: Brand: VALLEYLAB

## (undated) DEVICE — SOLUTION IV 1000ML 0.9% SOD CHL

## (undated) DEVICE — NEEDLE HYPO 18GA L1.5IN PNK S STL HUB POLYPR SHLD REG BVL

## (undated) DEVICE — COVER LT HNDL PLAS RIG 1 PER PK